# Patient Record
Sex: FEMALE | Race: WHITE | NOT HISPANIC OR LATINO | Employment: OTHER | URBAN - NONMETROPOLITAN AREA
[De-identification: names, ages, dates, MRNs, and addresses within clinical notes are randomized per-mention and may not be internally consistent; named-entity substitution may affect disease eponyms.]

---

## 2017-07-27 ENCOUNTER — HOSPITAL ENCOUNTER (EMERGENCY)
Facility: HOSPITAL | Age: 62
Discharge: HOME | End: 2017-07-27
Payer: COMMERCIAL

## 2017-07-27 VITALS
TEMPERATURE: 98.3 F | OXYGEN SATURATION: 98 % | RESPIRATION RATE: 18 BRPM | DIASTOLIC BLOOD PRESSURE: 88 MMHG | WEIGHT: 225 LBS | BODY MASS INDEX: 32.21 KG/M2 | SYSTOLIC BLOOD PRESSURE: 169 MMHG | HEIGHT: 70 IN | HEART RATE: 98 BPM

## 2017-07-27 DIAGNOSIS — J01.01 ACUTE RECURRENT MAXILLARY SINUSITIS: ICD-10-CM

## 2017-07-27 PROCEDURE — 99202 OFFICE O/P NEW SF 15 MIN: CPT

## 2017-07-27 PROCEDURE — 70486 CT MAXILLOFACIAL W/O DYE: CPT | Mod: TC

## 2017-07-27 PROCEDURE — 99213 OFFICE O/P EST LOW 20 MIN: CPT | Mod: 25

## 2017-07-27 ASSESSMENT — ENCOUNTER SYMPTOMS
FEVER: 0
ARTHRALGIAS: 0
NECK STIFFNESS: 0
ABDOMINAL PAIN: 0
ACTIVITY CHANGE: 1
SINUS PRESSURE: 1
RHINORRHEA: 1
COLOR CHANGE: 0
EYE REDNESS: 0
DIFFICULTY URINATING: 0
CONFUSION: 0
SHORTNESS OF BREATH: 0
APPETITE CHANGE: 1
HEADACHES: 0
COUGH: 1

## 2017-07-27 NOTE — DISCHARGE INSTRUCTIONS
See attached for home care  Rest, increase fluids  Tylenol/ibuprofon for pain  Start nasal irrigations with netti pot  Follow with flonase nasal spray  Take all of antibiotics as prescribed  Try to quit smoking  F/U with PCP if not improving or back to base line in 1 week  Return to  with worsening symptoms.       Sinusitis (Antibiotic Treatment)    The sinuses are air-filled spaces within the bones of the face. They connect to the inside of the nose. Sinusitis is an inflammation of the tissue lining the sinus cavity. Sinus inflammation can occur during a cold. It can also be due to allergies to pollens and other particles in the air. Sinusitis can cause symptoms of sinus congestion and fullness. A sinus infection causes fever, headache and facial pain. There is often green or yellow drainage from the nose or into the back of the throat (post-nasal drip). You have been given antibiotics to treat this condition.  Home care:    Take the full course of antibiotics as instructed. Do not stop taking them, even if you feel better.    Drink plenty of water, hot tea, and other liquids. This may help thin mucus. It also may promote sinus drainage.    Heat may help soothe painful areas of the face. Use a towel soaked in hot water. Or,  the shower and direct the hot spray onto your face. Using a vaporizer along with a menthol rub at night may also help.     An expectorant containing guaifenesin may help thin the mucus and promote drainage from the sinuses.    Over-the-counter decongestants may be used unless a similar medicine was prescribed. Nasal sprays work the fastest. Use one that contains phenylephrine or oxymetazoline. First blow the nose gently. Then use the spray. Do not use these medicines more often than directed on the label or symptoms may get worse. You may also use tablets containing pseudoephedrine. Avoid products that combine ingredients, because side effects may be increased. Read labels. You can  also ask the pharmacist for help. (NOTE: Persons with high blood pressure should not use decongestants. They can raise blood pressure.)    Over-the-counter antihistamines may help if allergies contributed to your sinusitis.      Do not use nasal rinses or irrigation during an acute sinus infection, unless told to by your health care provider. Rinsing may spread the infection to other sinuses.    Use acetaminophen or ibuprofen to control pain, unless another pain medicine was prescribed. (If you have chronic liver or kidney disease or ever had a stomach ulcer, talk with your doctor before using these medicines. Aspirin should never be used in anyone under 18 years of age who is ill with a fever. It may cause severe liver damage.)    Don't smoke. This can worsen symptoms.  Follow-up care  Follow up with your healthcare provider or our staff if you are not improving within the next week.  When to seek medical advice  Call your healthcare provider if any of these occur:    Facial pain or headache becoming more severe    Stiff neck    Unusual drowsiness or confusion    Swelling of the forehead or eyelids    Vision problems, including blurred or double vision    Fever of 100.4 F (38 C) or higher, or as directed by your healthcare provider    Seizure    Breathing problems    Symptoms not resolving within 10 days  Date Last Reviewed: 4/13/2015 2000-2017 [employer]. [address] [place] [address], [place] [address]. All rights reserved. This information is not intended as a substitute for professional medical care. Always follow your healthcare professional's instructions.

## 2017-07-27 NOTE — ED PROVIDER NOTES
"  History     Chief Complaint   Patient presents with     Sinusitis     ongoing for the last week     HPI  Delores Mac is a 62 year old female who presents to  via private car for evaluation of facial pressure, ear pain, nasal drainage, headache. Onset of sx 1+ week ago and progressively getting worse. Thick nasal drainage, scratchy throat, no fever, reports hx of sinusitis with similar sx. Smokes 1/2 ppd.     Allergies: No Known Allergies      No current facility-administered medications on file prior to encounter.   Current Outpatient Prescriptions on File Prior to Encounter:  Pseudoephedrine-DM-[id number] (DAY TIME COLD/FLU OR)      Patient Active Problem List   Diagnosis     Laceration of external auditory canal     Otorrhea     Chronic rhinitis     Hypertrophy of nasal turbinates     Past Surgical History:   Procedure Laterality Date     BREAST LUMPECTOMY, RT/LT       HYSTERECTOMY       TOE SURGERY      end of toes cut off from bone infection.       Social History   Substance Use Topics     Smoking status: Current Every Day Smoker     Packs/day: 0.75     Types: Cigarettes     Smokeless tobacco: Never Used     Alcohol use No       There is no immunization history on file for this patient.    BMI: Estimated body mass index is 32.28 kg/(m^2) as calculated from the following:    Height as of this encounter: 1.778 m (5' 10\").    Weight as of this encounter: 102.1 kg (225 lb).    Review of Systems   Constitutional: Positive for activity change and appetite change. Negative for fever.   HENT: Positive for congestion, postnasal drip, rhinorrhea and sinus pressure.    Eyes: Negative for redness.   Respiratory: Positive for cough. Negative for shortness of breath.    Cardiovascular: Negative for chest pain.   Gastrointestinal: Negative for abdominal pain.   Genitourinary: Negative for difficulty urinating.   Musculoskeletal: Negative for arthralgias and neck stiffness.   Skin: Negative for color change. " "  Neurological: Negative for headaches.   Psychiatric/Behavioral: Negative for confusion.       Physical Exam   BP: 169/88  Pulse: 98  Temp: 98.3  F (36.8  C)  Resp: 18  Height: 177.8 cm (5' 10\")  Weight: 102.1 kg (225 lb)  SpO2: 98 %  Physical Exam   Constitutional: No distress.   HENT:   Head: Normocephalic and atraumatic.   Right Ear: Tympanic membrane normal.   Left Ear: Tympanic membrane is retracted.   Nose: Mucosal edema and rhinorrhea present. Left sinus exhibits maxillary sinus tenderness.   Mouth/Throat: Uvula is midline, oropharynx is clear and moist and mucous membranes are normal.   PTP mild soft tissue swelling over left mastoid   Eyes: Conjunctivae are normal.   Neck: Normal range of motion.   Cardiovascular: Normal rate and regular rhythm.    Pulmonary/Chest: Effort normal and breath sounds normal. No respiratory distress.   Abdominal: Soft. She exhibits no distension.   Musculoskeletal: Normal range of motion.   Lymphadenopathy:     She has no cervical adenopathy.   Skin: Skin is warm and dry. She is not diaphoretic.       ED Course     Procedures         CT SCAN OF PARANASAL SINUSES    CLINICAL HISTORY:  Question left-sided mastoiditis versus sinusitis.    FINDINGS:  The mucosa of the paranasal sinuses is essentially normal.  There is, however, a small mucous retention cyst versus polypoid  lesion seen in the lateral margin of the posterior right ethmoid air  cell.  Ostiomeatal units are patent.  There is perhaps minimal mucosal  thickening seen along the right ostium.    Mastoid air cells of the temporal bones and the  middle ear cavities  are grossly normal.  Soft tissues are also normal.    IMPRESSION:  1.  MUCOUS RETENTION CYST VERSUS POLYPOID LESION INVOLVING THE  POSTERIOR RIGHT ETHMOID AIR CELL, WITH MILD MUCOSAL THICKENING ABOUT  THE RIGHT OSTIUM.    2.  NO EVIDENCE OF ABNORMALITY THAT WOULD ACCOUNT FOR THE PATIENT'S  LEFT-SIDED DISCOMFORT.    SIGNATURE PAGE ONLY  Exam Date: Jul 27, 2017 " 11:32:00 AM  Author: CARMELO RENEE    Assessments & Plan (with Medical Decision Making)   Pt presents with facial pressure, congestion, left mastoid pain on palpation. CT shows mild mucus thickening with mucous retention cyst vs polypoid lesion on the right, no indication of mastoiditis.   Findings consistent with recurrent sinusitis. Will treat with Augmentin, smoking cessation advised. Pt verbalizes understanding and agrees with plan.  Epic discharge instructions given. Pt discharged in stable condition.     I have reviewed the nursing notes.    I have reviewed the findings, diagnosis, plan and need for follow up with the patient.    Discharge Medication List as of 7/27/2017 11:39 AM          Final diagnoses:   Acute recurrent maxillary sinusitis     See attached for home care  Rest, increase fluids  Tylenol/ibuprofon for pain  Start nasal irrigations with netti pot  Follow with flonase nasal spray  Take all of antibiotics as prescribed  Try to quit smoking  F/U with PCP if not improving or back to base line in 1 week  Return to  with worsening symptoms.     7/27/2017   [facility]     Dot Orantes APRN FNP  07/28/17 1021

## 2017-07-27 NOTE — ED NOTES
C/o sinus pain, facial pain/pressure and congestion for the last week. Pt is going on vacation tomorrow and concerned she may need antibiotics beforehand

## 2017-07-27 NOTE — PROGRESS NOTES
CT paranasal sinuses report routed to Dr Goodrich. IMPRESSION:  MUCOUS RETENTION CYST VERSUS POLYPOID LESION INVOLVING THE POSTERIOR RIGHT ETHMOID AIR CELL, WITH MILD MUCOSAL THICKENING ABOUT THE RIGHT OSTIUM.

## 2017-07-27 NOTE — ED AVS SNAPSHOT
[facility]    [address]    [place] [address]    Phone:  [phone]                                       Delores Mac   MRN: [id number]    Department:  [facility]   Date of Visit:  7/27/2017           Patient Information     Date Of Birth          1955        Your diagnoses for this visit were:     Acute recurrent maxillary sinusitis        You were seen by Dot Orantes APRN FNP.      Follow-up Information     Follow up with MATTHEW Goodrich MD In 1 week.    Specialty:  Family Practice    Why:  if not improving or back to baseline    Contact information:    [facility] [place]  [address]  [place] [address] [phone]          Follow up with [facility].    Specialty:  EMERGENCY MEDICINE    Why:  As needed, If symptoms worsen    Contact information:    [address] [address] [phone]    Additional information:    From [place]: Take [address]. Turn left at [address]. Turn left at the first stoplight on [address]. At the first stop sign, take a right onto [address]. Take a left into the parking lot and continue through until you reach the North enterance of the building.       From [place]: Take [address]. Take the [address] ramp towards [place]. Turn left onto [address]. Take a slight right onto [address]. Turn left at the first stoplight on [address]. At the first stop sign, take a right onto [address]. Take a left into the parking lot and continue through until you reach the North enterance of the building.       From [place]: Take [address]. Take a right at [address]. At the first stop sign, take a right onto [address]. Take a left into the parking lot and continue through until you reach the North enterance of the building.         Discharge Instructions         See attached for home care  Rest,  increase fluids  Tylenol/ibuprofon for pain  Start nasal irrigations with netti pot  Follow with flonase nasal spray  Take all of antibiotics as prescribed  Try to quit smoking  F/U with PCP if not improving or back to base line in 1 week  Return to  with worsening symptoms.       Sinusitis (Antibiotic Treatment)    The sinuses are air-filled spaces within the bones of the face. They connect to the inside of the nose. Sinusitis is an inflammation of the tissue lining the sinus cavity. Sinus inflammation can occur during a cold. It can also be due to allergies to pollens and other particles in the air. Sinusitis can cause symptoms of sinus congestion and fullness. A sinus infection causes fever, headache and facial pain. There is often green or yellow drainage from the nose or into the back of the throat (post-nasal drip). You have been given antibiotics to treat this condition.  Home care:    Take the full course of antibiotics as instructed. Do not stop taking them, even if you feel better.    Drink plenty of water, hot tea, and other liquids. This may help thin mucus. It also may promote sinus drainage.    Heat may help soothe painful areas of the face. Use a towel soaked in hot water. Or,  the shower and direct the hot spray onto your face. Using a vaporizer along with a menthol rub at night may also help.     An expectorant containing guaifenesin may help thin the mucus and promote drainage from the sinuses.    Over-the-counter decongestants may be used unless a similar medicine was prescribed. Nasal sprays work the fastest. Use one that contains phenylephrine or oxymetazoline. First blow the nose gently. Then use the spray. Do not use these medicines more often than directed on the label or symptoms may get worse. You may also use tablets containing pseudoephedrine. Avoid products that combine ingredients, because side effects may be increased. Read labels. You can also ask the pharmacist for help.  (NOTE: Persons with high blood pressure should not use decongestants. They can raise blood pressure.)    Over-the-counter antihistamines may help if allergies contributed to your sinusitis.      Do not use nasal rinses or irrigation during an acute sinus infection, unless told to by your health care provider. Rinsing may spread the infection to other sinuses.    Use acetaminophen or ibuprofen to control pain, unless another pain medicine was prescribed. (If you have chronic liver or kidney disease or ever had a stomach ulcer, talk with your doctor before using these medicines. Aspirin should never be used in anyone under 18 years of age who is ill with a fever. It may cause severe liver damage.)    Don't smoke. This can worsen symptoms.  Follow-up care  Follow up with your healthcare provider or our staff if you are not improving within the next week.  When to seek medical advice  Call your healthcare provider if any of these occur:    Facial pain or headache becoming more severe    Stiff neck    Unusual drowsiness or confusion    Swelling of the forehead or eyelids    Vision problems, including blurred or double vision    Fever of 100.4 F (38 C) or higher, or as directed by your healthcare provider    Seizure    Breathing problems    Symptoms not resolving within 10 days  Date Last Reviewed: 4/13/2015 2000-2017 [employer]. [address] [place] [address], [place] [address]. All rights reserved. This information is not intended as a substitute for professional medical care. Always follow your healthcare professional's instructions.             Review of your medicines      Our records show that you are taking the medicines listed below. If these are incorrect, please call your family doctor or clinic.        Dose / Directions Last dose taken    DAY TIME COLD/FLU OR        Refills:  0                Procedures and tests performed during your visit     CT Sinus w/o Contrast      Orders Needing Specimen  "Collection     None      Pending Results     Date and Time Order Name Status Description    2017 1115 CT Sinus w/o Contrast Preliminary             Pending Culture Results     No orders found from 2017 to 2017.            Thank you for choosing [facility]       Thank you for choosing [facility] for your care. Our goal is always to provide you with excellent care. Hearing back from our patients is one way we can continue to improve our services. Please take a few minutes to complete the written survey that you may receive in the mail after you visit with us. Thank you!        [employer] [facility] Information     [facility] lets you send messages to your doctor, view your test results, renew your prescriptions, schedule appointments and more. To sign up, go to [email or web address] . Click on \"Log in\" on the left side of the screen, which will take you to the Welcome page. Then click on \"Sign up Now\" on the right side of the page.     You will be asked to enter the access code listed below, as well as some personal information. Please follow the directions to create your username and password.     Your access code is: [email or web address]  Expires: 10/25/2017 11:39 AM     Your access code will  in 90 days. If you need help or a new code, please call your [facility] clinic or [phone].        Care EveryWhere ID     This is your Care EveryWhere ID. This could be used by other organizations to access your [facility] medical records  [id number]        Equal Access to Services     RONNIE ESPINOZA AH: Hadii rhea tobiaso Sovy, waaxda luqadaha, qaybta kaalmada jose, stefan acevedo . So [facility] [place] [phone].    ATENCIÓN: Si habla español, tiene a massey disposición servicios gratuitos de asistencia lingüística. Llame al [phone].    We comply with applicable federal civil rights laws and [place] laws. We do not discriminate on the basis of race, color, national origin, age, disability sex, sexual " orientation or gender identity.            After Visit Summary       This is your record. Keep this with you and show to your community pharmacist(s) and doctor(s) at your next visit.

## 2017-07-27 NOTE — ED AVS SNAPSHOT
[facility]    [address] [place] [address]    Phone:  [phone]                                       Delores Mac   MRN: [id number]    Department:  [facility]   Date of Visit:  7/27/2017           After Visit Summary Signature Page     I have received my discharge instructions, and my questions have been answered. I have discussed any challenges I see with this plan with the nurse or doctor.    ..........................................................................................................................................  Patient/Patient Representative Signature      ..........................................................................................................................................  Patient Representative Print Name and Relationship to Patient    ..................................................               ................................................  Date                                            Time    ..........................................................................................................................................  Reviewed by Signature/Title    ...................................................              ..............................................  Date                                                            Time

## 2017-08-07 ENCOUNTER — OFFICE VISIT (OUTPATIENT)
Dept: FAMILY MEDICINE | Facility: OTHER | Age: 62
End: 2017-08-07
Attending: FAMILY MEDICINE
Payer: COMMERCIAL

## 2017-08-07 ENCOUNTER — TELEPHONE (OUTPATIENT)
Dept: FAMILY MEDICINE | Facility: OTHER | Age: 62
End: 2017-08-07

## 2017-08-07 VITALS
TEMPERATURE: 99.4 F | HEART RATE: 105 BPM | WEIGHT: 219 LBS | OXYGEN SATURATION: 96 % | HEIGHT: 70 IN | BODY MASS INDEX: 31.35 KG/M2 | SYSTOLIC BLOOD PRESSURE: 122 MMHG | DIASTOLIC BLOOD PRESSURE: 82 MMHG

## 2017-08-07 DIAGNOSIS — J01.00 ACUTE MAXILLARY SINUSITIS, RECURRENCE NOT SPECIFIED: Primary | ICD-10-CM

## 2017-08-07 PROCEDURE — 99213 OFFICE O/P EST LOW 20 MIN: CPT | Performed by: FAMILY MEDICINE

## 2017-08-07 RX ORDER — LEVOFLOXACIN 500 MG/1
500 TABLET, FILM COATED ORAL DAILY
Qty: 10 TABLET | Refills: 0 | Status: SHIPPED | OUTPATIENT
Start: 2017-08-07 | End: 2018-06-25

## 2017-08-07 RX ORDER — FLUTICASONE PROPIONATE 50 MCG
2 SPRAY, SUSPENSION (ML) NASAL 2 TIMES DAILY
COMMUNITY
End: 2018-06-25

## 2017-08-07 ASSESSMENT — ANXIETY QUESTIONNAIRES
GAD7 TOTAL SCORE: 0
7. FEELING AFRAID AS IF SOMETHING AWFUL MIGHT HAPPEN: NOT AT ALL
6. BECOMING EASILY ANNOYED OR IRRITABLE: NOT AT ALL
3. WORRYING TOO MUCH ABOUT DIFFERENT THINGS: NOT AT ALL
5. BEING SO RESTLESS THAT IT IS HARD TO SIT STILL: NOT AT ALL
2. NOT BEING ABLE TO STOP OR CONTROL WORRYING: NOT AT ALL
1. FEELING NERVOUS, ANXIOUS, OR ON EDGE: NOT AT ALL
4. TROUBLE RELAXING: NOT AT ALL

## 2017-08-07 ASSESSMENT — PATIENT HEALTH QUESTIONNAIRE - PHQ9: SUM OF ALL RESPONSES TO PHQ QUESTIONS 1-9: 0

## 2017-08-07 ASSESSMENT — PAIN SCALES - GENERAL: PAINLEVEL: MODERATE PAIN (5)

## 2017-08-07 NOTE — TELEPHONE ENCOUNTER
8:16 AM    Reason for Call: OVERBOOK    Patient is having the following symptoms: Follow up sinus infection for 2 weeks.    The patient is requesting an appointment for overbook with Joleen. - patient stated provider told her to call back today if not feeling better and she would be worked in.     Was an appointment offered for this call? Yes 08/08 patient declined    Preferred method for responding to this message: Telephone Call    If we cannot reach you directly, may we leave a detailed response at the number you provided? Yes    Can this message wait until your PCP/provider returns, if unavailable today? Not applicable,     Letitia Morfin

## 2017-08-07 NOTE — NURSING NOTE
"Chief Complaint   Patient presents with     Sinus Problem       Initial /82 (BP Location: Left arm, Patient Position: Chair, Cuff Size: Adult Large)  Pulse 105  Temp 99.4  F (37.4  C) (Tympanic)  Ht 5' 10\" (1.778 m)  Wt 219 lb (99.3 kg)  SpO2 96%  BMI 31.42 kg/m2 Estimated body mass index is 31.42 kg/(m^2) as calculated from the following:    Height as of this encounter: 5' 10\" (1.778 m).    Weight as of this encounter: 219 lb (99.3 kg).  Medication Reconciliation: complete     ASAD VERGARA      "

## 2017-08-07 NOTE — MR AVS SNAPSHOT
After Visit Summary   8/7/2017    Delores Mac    MRN: [id number]           Patient Information     Date Of Birth          1955        Visit Information        Provider Department      8/7/2017 4:00 PM [facility]        Today's Diagnoses     Acute maxillary sinusitis, recurrence not specified    -  1      Care Instructions    Stop the DM, see DR. Looney          Follow-ups after your visit        Additional Services     OTOLARYNGOLOGY REFERRAL       Your provider has referred you to: [facility] Davion Azul [phone]   [email or web address]    Please be aware that coverage of these services is subject to the terms and limitations of your health insurance plan.  Call member services at your health plan with any benefit or coverage questions.      Please bring the following to your appointment:  >>   Any x-rays, CTs or MRIs which have been performed.  Contact the facility where they were done to arrange for  prior to your scheduled appointment.  Any new CT, MRI or other procedures ordered by your specialist must be performed at a [facility] facility or coordinated by your clinic's referral office.    >>   List of current medications   >>   This referral request   >>   Any documents/labs given to you for this referral                  Who to contact     If you have questions or need follow up information about today's clinic visit or your schedule please [facility] at [phone].  Normal or non-critical lab and imaging results will be communicated to you by MyChart, letter or phone within 4 business days after the clinic has received the results. If you do not hear from us within 7 days, please contact the clinic through MyChart or phone. If you have a critical or abnormal lab result, we will notify you by phone as soon as possible.  Submit refill requests  "through [employer] or call your pharmacy and they will forward the refill request to us. Please allow 3 business days for your refill to be completed.          Additional Information About Your Visit        [employer]Wall Information     Ret[employer]ly lets you send messages to your doctor, view your test results, renew your prescriptions, schedule appointments and more. To sign up, go to [email or web address] . Click on \"Log in\" on the left side of the screen, which will take you to the Welcome page. Then click on \"Sign up Now\" on the right side of the page.     You will be asked to enter the access code listed below, as well as some personal information. Please follow the directions to create your username and password.     Your access code is: [email or web address]  Expires: 10/25/2017 11:39 AM     Your access code will  in 90 days. If you need help or a new code, please call your [facility] clinic or [phone].        Care EveryWhere ID     This is your Care EveryWhere ID. This could be used by other organizations to access your [facility] medical records  [id number]        Your Vitals Were     Pulse Temperature Height Pulse Oximetry BMI (Body Mass Index)       105 99.4  F (37.4  C) (Tympanic) 5' 10\" (1.778 m) 96% 31.42 kg/m2        Blood Pressure from Last 3 Encounters:   17 122/82   17 169/88   14 126/76    Weight from Last 3 Encounters:   17 219 lb (99.3 kg)   17 225 lb (102.1 kg)   14 200 lb (90.7 kg)              We Performed the Following     OTOLARYNGOLOGY REFERRAL          Today's Medication Changes          These changes are accurate as of: 17  4:14 PM.  If you have any questions, ask your nurse or doctor.               Start taking these medicines.        Dose/Directions    levofloxacin 500 MG tablet   Commonly known as:  LEVAQUIN   Used for:  Acute maxillary sinusitis, recurrence not specified   Started by:  MATTHEW Goodrich MD        Dose:  500 mg   Take 1 tablet (500 mg) by " mouth daily   Quantity:  10 tablet   Refills:  0       loratadine-pseudoePHEDrine  MG per 24 hr tablet   Commonly known as:  CLARITIN-D 24-hour   Used for:  Acute maxillary sinusitis, recurrence not specified   Started by:  MATTHEW Goodrich MD        Dose:  1 tablet   Take 1 tablet by mouth daily   Quantity:  20 tablet   Refills:  0         Stop taking these medicines if you haven't already. Please contact your care team if you have questions.     amoxicillin-clavulanate 875-125 MG per tablet   Commonly known as:  AUGMENTIN   Stopped by:  MATTHEW Goodrich MD                Where to get your medicines      These medications were sent to [id number] - [place], [place] - [address] AT [facility] [address], [facility] [address]     Phone:  [phone]     levofloxacin 500 MG tablet         Some of these will need a paper prescription and others can be bought over the counter.  Ask your nurse if you have questions.     Bring a paper prescription for each of these medications     loratadine-pseudoePHEDrine  MG per 24 hr tablet                Primary Care Provider Office Phone # Fax #    MATTHEW Goodrich [phone]       [address] [address]        Equal Access to Services     RONNIE ESPINOZA AH: Hadii rhea ku hadasho Soomaali, waaxda luqadaha, qaybta kaalmada adeegyada, waxay idiin hayren arlyn blum. So [facility] [phone].    ATENCIÓN: Si habla español, tiene a massey disposición servicios gratuitos de asistencia lingüística. Llame al [phone].    We comply with applicable federal civil rights laws and [place] laws. We do not discriminate on the basis of race, color, national origin, age, disability sex, sexual orientation or gender identity.            Thank you!     Thank you for choosing [facility]  for your care. Our goal is always to provide you with excellent care. Hearing back from our  patients is one way we can continue to improve our services. Please take a few minutes to complete the written survey that you may receive in the mail after your visit with us. Thank you!             Your Updated Medication List - Protect others around you: Learn how to safely use, store and throw away your medicines at www.disposemymeds.org.          This list is accurate as of: 8/7/17  4:14 PM.  Always use your most recent med list.                   Brand Name Dispense Instructions for use Diagnosis    DAY TIME COLD/FLU OR           fluticasone 50 MCG/ACT spray    FLONASE     Spray 2 sprays into both nostrils 2 times daily        levofloxacin 500 MG tablet    LEVAQUIN    10 tablet    Take 1 tablet (500 mg) by mouth daily    Acute maxillary sinusitis, recurrence not specified       loratadine-pseudoePHEDrine  MG per 24 hr tablet    CLARITIN-D 24-hour    20 tablet    Take 1 tablet by mouth daily    Acute maxillary sinusitis, recurrence not specified

## 2017-08-07 NOTE — PROGRESS NOTES
SUBJECTIVE:                                                    Delores Mac is a 62 year old female who presents to clinic today for the following health issues:      RESPIRATORY SYMPTOMS      Duration: Follow up still having symptoms- see CT scan regarding results    Description  nasal congestion, rhinorrhea, facial pain/pressure, cough, wheezing, headache, fatigue/malaise, hoarse voice and diarrhea    Severity: moderate    Accompanying signs and symptoms: chest congestion    History (predisposing factors):  yes    Precipitating or alleviating factors: None    Therapies tried and outcome:  augmentin- finished          She does smoke    Problem list and histories reviewed & adjusted, as indicated.  Additional history: as documented    Patient Active Problem List   Diagnosis     Laceration of external auditory canal     Otorrhea     Chronic rhinitis     Hypertrophy of nasal turbinates     Past Surgical History:   Procedure Laterality Date     BREAST LUMPECTOMY, RT/LT       HYSTERECTOMY       TOE SURGERY      end of toes cut off from bone infection.       Social History   Substance Use Topics     Smoking status: Current Every Day Smoker     Packs/day: 0.75     Types: Cigarettes     Smokeless tobacco: Never Used     Alcohol use No     Family History   Problem Relation Age of Onset     Breast Cancer Mother      HEART DISEASE Brother          Current Outpatient Prescriptions   Medication Sig Dispense Refill     fluticasone (FLONASE) 50 MCG/ACT spray Spray 2 sprays into both nostrils 2 times daily       levofloxacin (LEVAQUIN) 500 MG tablet Take 1 tablet (500 mg) by mouth daily 10 tablet 0     loratadine-pseudoePHEDrine (CLARITIN-D 24-HOUR)  MG per 24 hr tablet Take 1 tablet by mouth daily 20 tablet 0     Pseudoephedrine-DM-GG-APAP (DAY TIME COLD/FLU OR)        No Known Allergies      Reviewed and updated as needed this visit by clinical staff     Reviewed and updated as needed this visit by Provider      "    ROS:  Constitutional, HEENT, cardiovascular, pulmonary, gi and gu systems are negative, except as otherwise noted.      OBJECTIVE:   /82 (BP Location: Left arm, Patient Position: Chair, Cuff Size: Adult Large)  Pulse 105  Temp 99.4  F (37.4  C) (Tympanic)  Ht 5' 10\" (1.778 m)  Wt 219 lb (99.3 kg)  SpO2 96%  BMI 31.42 kg/m2  Body mass index is 31.42 kg/(m^2).  GENERAL: healthy, alert and no distress  NECK: no adenopathy, no asymmetry, masses, or scars and thyroid normal to palpation  HEENT: normal TMs.  She has maxillary sinus tenderness oropharynx is negative  RESP: lungs clear to auscultation - no rales, rhonchi or wheezes  CV: regular rate and rhythm, normal S1 S2, no S3 or S4, no murmur, click or rub, no peripheral edema and peripheral pulses strong  MS: no gross musculoskeletal defects noted, no edema    Diagnostic Test Results:  Results for orders placed or performed during the hospital encounter of 07/27/17   CT Sinus w/o Contrast    Narrative    CT SCAN OF PARANASAL SINUSES    CLINICAL HISTORY:  Question left-sided mastoiditis versus sinusitis.    FINDINGS:  The mucosa of the paranasal sinuses is essentially normal.  There is, however, a small mucous retention cyst versus polypoid  lesion seen in the lateral margin of the posterior right ethmoid air  cell.  Ostiomeatal units are patent.  There is perhaps minimal mucosal  thickening seen along the right ostium.    Mastoid air cells of the temporal bones and the  middle ear cavities  are grossly normal.  Soft tissues are also normal.    IMPRESSION:  1.  MUCOUS RETENTION CYST VERSUS POLYPOID LESION INVOLVING THE  POSTERIOR RIGHT ETHMOID AIR CELL, WITH MILD MUCOSAL THICKENING ABOUT  THE RIGHT OSTIUM.    2.  NO EVIDENCE OF ABNORMALITY THAT WOULD ACCOUNT FOR THE PATIENT'S  LEFT-SIDED DISCOMFORT.                        SIGNATURE PAGE ONLY  Exam Date: Jul 27, 2017 11:32:00 AM  Author: CARMELO RENEE  This report is final and signed        "     ASSESSMENT/PLAN:               ICD-10-CM    1. Acute maxillary sinusitis, recurrence not specified J01.00 levofloxacin (LEVAQUIN) 500 MG tablet once daily for 10 days.  Use Claritin-D.  Because of her abnormal sinus CT will set her up to see ENT     loratadine-pseudoePHEDrine (CLARITIN-D 24-HOUR)  MG per 24 hr tablet     OTOLARYNGOLOGY REFERRAL           R Arsh Goodrich MD  [facility]

## 2017-08-08 ASSESSMENT — ANXIETY QUESTIONNAIRES: GAD7 TOTAL SCORE: 0

## 2017-11-26 ENCOUNTER — HEALTH MAINTENANCE LETTER (OUTPATIENT)
Age: 62
End: 2017-11-26

## 2018-06-25 ENCOUNTER — RADIANT APPOINTMENT (OUTPATIENT)
Dept: GENERAL RADIOLOGY | Facility: OTHER | Age: 63
End: 2018-06-25
Attending: FAMILY MEDICINE
Payer: COMMERCIAL

## 2018-06-25 ENCOUNTER — OFFICE VISIT (OUTPATIENT)
Dept: FAMILY MEDICINE | Facility: OTHER | Age: 63
End: 2018-06-25
Attending: FAMILY MEDICINE
Payer: COMMERCIAL

## 2018-06-25 ENCOUNTER — TELEPHONE (OUTPATIENT)
Dept: FAMILY MEDICINE | Facility: OTHER | Age: 63
End: 2018-06-25

## 2018-06-25 VITALS
OXYGEN SATURATION: 96 % | TEMPERATURE: 98.8 F | DIASTOLIC BLOOD PRESSURE: 81 MMHG | WEIGHT: 221 LBS | HEART RATE: 95 BPM | BODY MASS INDEX: 31.64 KG/M2 | HEIGHT: 70 IN | SYSTOLIC BLOOD PRESSURE: 122 MMHG

## 2018-06-25 DIAGNOSIS — R07.9 RIGHT-SIDED CHEST PAIN: Primary | ICD-10-CM

## 2018-06-25 DIAGNOSIS — R07.9 RIGHT-SIDED CHEST PAIN: ICD-10-CM

## 2018-06-25 PROCEDURE — 71101 X-RAY EXAM UNILAT RIBS/CHEST: CPT | Mod: TC

## 2018-06-25 PROCEDURE — 99213 OFFICE O/P EST LOW 20 MIN: CPT | Performed by: FAMILY MEDICINE

## 2018-06-25 ASSESSMENT — PAIN SCALES - GENERAL: PAINLEVEL: EXTREME PAIN (8)

## 2018-06-25 NOTE — TELEPHONE ENCOUNTER
9:37 AM    Reason for Call: OVERBOOK    Patient is having the following symptoms: fell down the steps and thinks she broke some ribs for 2 days.    The patient is requesting an appointment for 06/25/18 with Dr.Jon Goodrich.    Was an appointment offered for this call? No  If yes : Appointment type              Date    Preferred method for responding to this message: Telephone Call  What is your phone number ?[phone]    If we cannot reach you directly, may we leave a detailed response at the number you provided? Yes    Can this message wait until your PCP/provider returns, if unavailable today? Not applicable, PCP is in     [facility]

## 2018-06-25 NOTE — NURSING NOTE
"Chief Complaint   Patient presents with     Musculoskeletal Problem       Initial /81 (BP Location: Left arm, Patient Position: Chair, Cuff Size: Adult Large)  Pulse 95  Temp 98.8  F (37.1  C) (Tympanic)  Ht 5' 10\" (1.778 m)  Wt 221 lb (100.2 kg)  SpO2 96%  BMI 31.71 kg/m2 Estimated body mass index is 31.71 kg/(m^2) as calculated from the following:    Height as of this encounter: 5' 10\" (1.778 m).    Weight as of this encounter: 221 lb (100.2 kg).  Medication Reconciliation: complete    Caroline Zhao LPN    "

## 2018-06-25 NOTE — PROGRESS NOTES
"  SUBJECTIVE:   Delores Mac is a 62 year old female who presents to clinic today for the following health issues:      Musculoskeletal problem/pain      Duration: Since Saturday. Got worse yesterday    Description  Location: Right side back ribs    Intensity:  8/10  Goes up to a 12 when sneezing or coughing    Accompanying signs and symptoms: none    History  Previous similar problem: no   Previous evaluation:  none    Precipitating or alleviating factors:  Trauma or overuse: YES  Aggravating factors include: standing and coughing and sneezing, lowering into a chair and getting out of the chair is hard    Therapies tried and outcome: ice and Ibuprofen      [facility]    Delores Mac, 62 year old, female presents with   Chief Complaint   Patient presents with     Musculoskeletal Problem       PAST MEDICAL HISTORY:  History reviewed. No pertinent past medical history.    PAST SURGICAL HISTORY:  Past Surgical History:   Procedure Laterality Date     BREAST LUMPECTOMY, RT/LT       HYSTERECTOMY       TOE SURGERY      end of toes cut off from bone infection.       MEDICATIONS:  Prior to Admission medications    Not on File       ALLERGIES:   No Known Allergies    ROS:  Constitutional, HEENT, cardiovascular, pulmonary, gi and gu systems are negative, except as otherwise noted.      EXAM:  /81 (BP Location: Left arm, Patient Position: Chair, Cuff Size: Adult Large)  Pulse 95  Temp 98.8  F (37.1  C) (Tympanic)  Ht 5' 10\" (1.778 m)  Wt 221 lb (100.2 kg)  SpO2 96%  BMI 31.71 kg/m2 Body mass index is 31.71 kg/(m^2).   GENERAL APPEARANCE: healthy, alert and no distress  RESP: lungs clear to auscultation - no rales, rhonchi or wheezes  CV: regular rates and rhythm, normal S1 S2, no S3 or S4 and no murmur, click or rub  MS: Has little bruising in right posterior chest tenderness there are no swelling  Lab/ X-ray  Preliminary chest x-ray with ribs negative for fracture    ASSESSMENT/PLAN:    " ICD-10-CM    1. Right-sided chest pain R07.9 XR Ribs & Chest Rt 3vw     Patient has right-sided chest pain from a fall and has a chest wall contusion no apparent fracture.  Explained that if she develops sudden shortness of breath she needs to go to the emergency room.  Most likely this is a intercostal strain and it should get better in the next few days.  It is possible she has a fracture that cannot be seen on plain x-ray.  She is aware that there is no procedure where we cast these types of fractures.  If she has a cough she noticed a splint but otherwise will work with deep breathing and she will take ibuprofen and use ice therapy.    SHAD Goodrich MD  June 25, 2018

## 2018-06-25 NOTE — LETTER
[facility]  [address] [place] [address]  Phone: [phone]    June 25, 2018        Delores LOCKWOOD Bubba  [address] [place] [address]          To whom it may concern:    RE: Delores LOCKWOOD Bubba    Patient was seen and treated today at our clinic.    Please contact me for questions or concerns.      Sincerely,        MATTHEW Goodrich MD

## 2018-06-25 NOTE — MR AVS SNAPSHOT
"              After Visit Summary   6/25/2018    Delores Mac    MRN: [id number]           Patient Information     Date Of Birth          1955        Visit Information        Provider Department      6/25/2018 3:45 PM [facility]        Today's Diagnoses     Need for hepatitis C screening test    -  1    Right-sided chest pain           Follow-ups after your visit        Who to contact     If you have questions or need follow up information about today's clinic visit or your schedule please contact [facility] directly at [phone].  Normal or non-critical lab and imaging results will be communicated to you by MyChart, letter or phone within 4 business days after the clinic has received the results. If you do not hear from us within 7 days, please contact the clinic through MyChart or phone. If you have a critical or abnormal lab result, we will notify you by phone as soon as possible.  Submit refill requests through [employer] or call your pharmacy and they will forward the refill request to us. Please allow 3 business days for your refill to be completed.          Additional Information About Your Visit        Care EveryWhere ID     This is your Care EveryWhere ID. This could be used by other organizations to access your [place] medical records  [id number]        Your Vitals Were     Pulse Temperature Height Pulse Oximetry BMI (Body Mass Index)       95 98.8  F (37.1  C) (Tympanic) 5' 10\" (1.778 m) 96% 31.71 kg/m2        Blood Pressure from Last 3 Encounters:   06/25/18 122/81   08/07/17 122/82   07/27/17 169/88    Weight from Last 3 Encounters:   06/25/18 221 lb (100.2 kg)   08/07/17 219 lb (99.3 kg)   07/27/17 225 lb (102.1 kg)                 Today's Medication Changes          These changes are accurate as of 6/25/18  4:18 PM.  If you have any questions, ask your nurse or doctor.               Stop taking these medicines if you haven't already. Please " contact your care team if you have questions.     loratadine-pseudoePHEDrine  MG per 24 hr tablet   Commonly known as:  CLARITIN-D 24-hour   Stopped by:  MATTHEW Goodrich MD                    Primary Care Provider Office Phone # Fax #    MATTHEW Goodrich [phone] [address] [address]        Equal Access to Services     [facility]: Hadii aad ku hadasho Soomaali, waaxda luqadaha, qaybta kaalmada adeegyada, waxay idiin hayaan adeeg kharash la'aan . So [facility] [phone].    ATENCIÓN: Si habla español, tiene a massey disposición servicios gratuitos de asistencia lingüística. Llame al [phone].    We comply with applicable federal civil rights laws and [place] laws. We do not discriminate on the basis of race, color, national origin, age, disability, sex, sexual orientation, or gender identity.            Thank you!     Thank you for choosing [facility]  for your care. Our goal is always to provide you with excellent care. Hearing back from our patients is one way we can continue to improve our services. Please take a few minutes to complete the written survey that you may receive in the mail after your visit with us. Thank you!             Your Updated Medication List - Protect others around you: Learn how to safely use, store and throw away your medicines at www.disposemymeds.org.      Notice  As of 6/25/2018  4:18 PM    You have not been prescribed any medications.

## 2018-06-27 ASSESSMENT — ANXIETY QUESTIONNAIRES
4. TROUBLE RELAXING: NOT AT ALL
7. FEELING AFRAID AS IF SOMETHING AWFUL MIGHT HAPPEN: NOT AT ALL
1. FEELING NERVOUS, ANXIOUS, OR ON EDGE: NOT AT ALL
5. BEING SO RESTLESS THAT IT IS HARD TO SIT STILL: NOT AT ALL
2. NOT BEING ABLE TO STOP OR CONTROL WORRYING: NOT AT ALL
3. WORRYING TOO MUCH ABOUT DIFFERENT THINGS: NOT AT ALL
GAD7 TOTAL SCORE: 0
6. BECOMING EASILY ANNOYED OR IRRITABLE: NOT AT ALL

## 2018-06-28 ASSESSMENT — PATIENT HEALTH QUESTIONNAIRE - PHQ9: SUM OF ALL RESPONSES TO PHQ QUESTIONS 1-9: 2

## 2018-06-28 ASSESSMENT — ANXIETY QUESTIONNAIRES: GAD7 TOTAL SCORE: 0

## 2018-10-05 ENCOUNTER — APPOINTMENT (OUTPATIENT)
Dept: GENERAL RADIOLOGY | Facility: HOSPITAL | Age: 63
End: 2018-10-05
Attending: FAMILY MEDICINE
Payer: OTHER MISCELLANEOUS

## 2018-10-05 ENCOUNTER — HOSPITAL ENCOUNTER (EMERGENCY)
Facility: HOSPITAL | Age: 63
Discharge: ANOTHER HOSPITAL | End: 2018-10-05
Attending: FAMILY MEDICINE | Admitting: FAMILY MEDICINE
Payer: OTHER MISCELLANEOUS

## 2018-10-05 ENCOUNTER — APPOINTMENT (OUTPATIENT)
Dept: CT IMAGING | Facility: HOSPITAL | Age: 63
End: 2018-10-05
Attending: FAMILY MEDICINE
Payer: OTHER MISCELLANEOUS

## 2018-10-05 VITALS
OXYGEN SATURATION: 93 % | HEART RATE: 91 BPM | DIASTOLIC BLOOD PRESSURE: 100 MMHG | SYSTOLIC BLOOD PRESSURE: 179 MMHG | RESPIRATION RATE: 18 BRPM | TEMPERATURE: 98.6 F

## 2018-10-05 DIAGNOSIS — S42.352A COMMINUTED LEFT HUMERAL FRACTURE, CLOSED, INITIAL ENCOUNTER: ICD-10-CM

## 2018-10-05 LAB
ALBUMIN SERPL-MCNC: 3.7 G/DL (ref 3.4–5)
ALP SERPL-CCNC: 117 U/L (ref 40–150)
ALT SERPL W P-5'-P-CCNC: 33 U/L (ref 0–50)
ANION GAP SERPL CALCULATED.3IONS-SCNC: 6 MMOL/L (ref 3–14)
APTT PPP: 24 SEC (ref 24–37)
AST SERPL W P-5'-P-CCNC: 13 U/L (ref 0–45)
BASOPHILS # BLD AUTO: 0.1 10E9/L (ref 0–0.2)
BASOPHILS NFR BLD AUTO: 0.3 %
BILIRUB SERPL-MCNC: 0.3 MG/DL (ref 0.2–1.3)
BUN SERPL-MCNC: 15 MG/DL (ref 7–30)
CALCIUM SERPL-MCNC: 8.8 MG/DL (ref 8.5–10.1)
CHLORIDE SERPL-SCNC: 107 MMOL/L (ref 94–109)
CO2 SERPL-SCNC: 25 MMOL/L (ref 20–32)
CREAT SERPL-MCNC: 0.72 MG/DL (ref 0.52–1.04)
DIFFERENTIAL METHOD BLD: ABNORMAL
EOSINOPHIL # BLD AUTO: 0 10E9/L (ref 0–0.7)
EOSINOPHIL NFR BLD AUTO: 0.2 %
ERYTHROCYTE [DISTWIDTH] IN BLOOD BY AUTOMATED COUNT: 13.5 % (ref 10–15)
GFR SERPL CREATININE-BSD FRML MDRD: 82 ML/MIN/1.7M2
GLUCOSE SERPL-MCNC: 104 MG/DL (ref 70–99)
HCT VFR BLD AUTO: 45 % (ref 35–47)
HGB BLD-MCNC: 15.1 G/DL (ref 11.7–15.7)
IMM GRANULOCYTES # BLD: 0.1 10E9/L (ref 0–0.4)
IMM GRANULOCYTES NFR BLD: 0.5 %
INR PPP: 1.03 (ref 0.8–1.2)
LYMPHOCYTES # BLD AUTO: 1.5 10E9/L (ref 0.8–5.3)
LYMPHOCYTES NFR BLD AUTO: 8.3 %
MCH RBC QN AUTO: 30.6 PG (ref 26.5–33)
MCHC RBC AUTO-ENTMCNC: 33.6 G/DL (ref 31.5–36.5)
MCV RBC AUTO: 91 FL (ref 78–100)
MONOCYTES # BLD AUTO: 1 10E9/L (ref 0–1.3)
MONOCYTES NFR BLD AUTO: 5.2 %
NEUTROPHILS # BLD AUTO: 15.5 10E9/L (ref 1.6–8.3)
NEUTROPHILS NFR BLD AUTO: 85.5 %
NRBC # BLD AUTO: 0 10*3/UL
NRBC BLD AUTO-RTO: 0 /100
PLATELET # BLD AUTO: 315 10E9/L (ref 150–450)
POTASSIUM SERPL-SCNC: 4.1 MMOL/L (ref 3.4–5.3)
PROT SERPL-MCNC: 7.7 G/DL (ref 6.8–8.8)
RBC # BLD AUTO: 4.94 10E12/L (ref 3.8–5.2)
SODIUM SERPL-SCNC: 138 MMOL/L (ref 133–144)
WBC # BLD AUTO: 18.1 10E9/L (ref 4–11)

## 2018-10-05 PROCEDURE — 36415 COLL VENOUS BLD VENIPUNCTURE: CPT | Performed by: FAMILY MEDICINE

## 2018-10-05 PROCEDURE — 96376 TX/PRO/DX INJ SAME DRUG ADON: CPT

## 2018-10-05 PROCEDURE — 72125 CT NECK SPINE W/O DYE: CPT | Mod: TC

## 2018-10-05 PROCEDURE — 96374 THER/PROPH/DIAG INJ IV PUSH: CPT

## 2018-10-05 PROCEDURE — 85610 PROTHROMBIN TIME: CPT | Performed by: FAMILY MEDICINE

## 2018-10-05 PROCEDURE — 25000128 H RX IP 250 OP 636: Performed by: FAMILY MEDICINE

## 2018-10-05 PROCEDURE — 99283 EMERGENCY DEPT VISIT LOW MDM: CPT | Performed by: FAMILY MEDICINE

## 2018-10-05 PROCEDURE — 73030 X-RAY EXAM OF SHOULDER: CPT | Mod: TC,LT

## 2018-10-05 PROCEDURE — 80053 COMPREHEN METABOLIC PANEL: CPT | Performed by: FAMILY MEDICINE

## 2018-10-05 PROCEDURE — 73120 X-RAY EXAM OF HAND: CPT | Mod: TC,RT

## 2018-10-05 PROCEDURE — 85730 THROMBOPLASTIN TIME PARTIAL: CPT | Performed by: FAMILY MEDICINE

## 2018-10-05 PROCEDURE — 99285 EMERGENCY DEPT VISIT HI MDM: CPT | Mod: 25

## 2018-10-05 PROCEDURE — 96375 TX/PRO/DX INJ NEW DRUG ADDON: CPT

## 2018-10-05 PROCEDURE — 85025 COMPLETE CBC W/AUTO DIFF WBC: CPT | Performed by: FAMILY MEDICINE

## 2018-10-05 PROCEDURE — 70450 CT HEAD/BRAIN W/O DYE: CPT | Mod: TC

## 2018-10-05 RX ORDER — FENTANYL CITRATE 50 UG/ML
50 INJECTION, SOLUTION INTRAMUSCULAR; INTRAVENOUS
Status: DISCONTINUED | OUTPATIENT
Start: 2018-10-05 | End: 2018-10-05 | Stop reason: HOSPADM

## 2018-10-05 RX ORDER — LORAZEPAM 2 MG/ML
1 INJECTION INTRAMUSCULAR ONCE
Status: COMPLETED | OUTPATIENT
Start: 2018-10-05 | End: 2018-10-05

## 2018-10-05 RX ORDER — HYDROMORPHONE HYDROCHLORIDE 1 MG/ML
0.5 INJECTION, SOLUTION INTRAMUSCULAR; INTRAVENOUS; SUBCUTANEOUS
Status: DISCONTINUED | OUTPATIENT
Start: 2018-10-05 | End: 2018-10-05 | Stop reason: HOSPADM

## 2018-10-05 RX ORDER — SODIUM PHOSPHATE,MONO-DIBASIC 19G-7G/118
1 ENEMA (ML) RECTAL 2 TIMES DAILY
COMMUNITY
End: 2023-05-15 | Stop reason: ALTCHOICE

## 2018-10-05 RX ORDER — FENTANYL CITRATE 50 UG/ML
50 INJECTION, SOLUTION INTRAMUSCULAR; INTRAVENOUS ONCE
Status: COMPLETED | OUTPATIENT
Start: 2018-10-05 | End: 2018-10-05

## 2018-10-05 RX ORDER — ONDANSETRON 2 MG/ML
4 INJECTION INTRAMUSCULAR; INTRAVENOUS ONCE
Status: COMPLETED | OUTPATIENT
Start: 2018-10-05 | End: 2018-10-05

## 2018-10-05 RX ORDER — SODIUM CHLORIDE 9 MG/ML
1000 INJECTION, SOLUTION INTRAVENOUS CONTINUOUS
Status: DISCONTINUED | OUTPATIENT
Start: 2018-10-05 | End: 2018-10-05 | Stop reason: HOSPADM

## 2018-10-05 RX ADMIN — SODIUM CHLORIDE 1000 ML: 9 INJECTION, SOLUTION INTRAVENOUS at 15:18

## 2018-10-05 RX ADMIN — FENTANYL CITRATE 50 MCG: 50 INJECTION INTRAMUSCULAR; INTRAVENOUS at 14:21

## 2018-10-05 RX ADMIN — LORAZEPAM 1 MG: 2 INJECTION INTRAMUSCULAR; INTRAVENOUS at 15:16

## 2018-10-05 RX ADMIN — LORAZEPAM 1 MG: 2 INJECTION INTRAMUSCULAR; INTRAVENOUS at 14:21

## 2018-10-05 RX ADMIN — ONDANSETRON 4 MG: 2 INJECTION, SOLUTION INTRAMUSCULAR; INTRAVENOUS at 12:16

## 2018-10-05 RX ADMIN — FENTANYL CITRATE 50 MCG: 50 INJECTION INTRAMUSCULAR; INTRAVENOUS at 12:16

## 2018-10-05 RX ADMIN — SODIUM CHLORIDE 1000 ML: 9 INJECTION, SOLUTION INTRAVENOUS at 12:16

## 2018-10-05 RX ADMIN — FENTANYL CITRATE 50 MCG: 50 INJECTION INTRAMUSCULAR; INTRAVENOUS at 13:43

## 2018-10-05 RX ADMIN — Medication 0.5 MG: at 16:46

## 2018-10-05 ASSESSMENT — ENCOUNTER SYMPTOMS
RESPIRATORY NEGATIVE: 1
CARDIOVASCULAR NEGATIVE: 1
CONSTITUTIONAL NEGATIVE: 1
NECK PAIN: 0
EYES NEGATIVE: 1
BACK PAIN: 0

## 2018-10-05 NOTE — ED NOTES
Patient arrives accompanied by family member for evaluation of a fall. Patient was at work this morning and tripped on some cords. Reports falling forward onto the front of her face. Contusion noted to left forehead. Denies loss of consciousness. Reporting a headache 6/10, no nausea or vomiting. Reporting left shoulder pain 8/10, as well as right thumb pain. CMS intact to left arm, no obvious deformity. Call light within reach.

## 2018-10-05 NOTE — ED TRIAGE NOTES
Fell landed on cement, hit head, has pain in left shoulder, head and thinks left leg has road rash.  No LOC.

## 2018-10-05 NOTE — ED NOTES
Patient aware of plan of care with no questions or concerns. Family at bedside. [facility] ambulance transferring patient to [facility]. Pain remains at 7/10. CMS intact to left extremity.

## 2018-10-05 NOTE — ED PROVIDER NOTES
History     Chief Complaint   Patient presents with     Fall     from standing hit cement floor, hit head, no LOC     HPI  Delores Mac is a 63 year old female who fell at work. She tripped, fell forward, and landed on her left shoulder. She hit the front of her head above her eye on the ground. Her had hurts, her left shoulder hurts, and the base of her right thumb hurts. She denies amnesia or LOC. She denies neck pain. Her left shoulder hurts with any movement of any part of her body. Staying still helps her pain.     Problem List:    There are no active problems to display for this patient.       Past Medical History:    History reviewed. No pertinent past medical history. She denies any chronic medical problems.    Past Surgical History:    Past Surgical History:   Procedure Laterality Date     BREAST LUMPECTOMY, RT/LT       HYSTERECTOMY       TOE SURGERY      end of toes cut off from bone infection.       Family History:    Family History   Problem Relation Age of Onset     Breast Cancer Mother      HEART DISEASE Brother        Social History:  Marital Status:   [2]  Social History   Substance Use Topics     Smoking status: Current Every Day Smoker     Packs/day: 1.00     Types: Cigarettes     Smokeless tobacco: Never Used     Alcohol use No        Medications:      glucosamine-chondroitin 500-400 MG CAPS per capsule   UNABLE TO FIND         Review of Systems   Constitutional: Negative.    HENT:        Headache on front of head   Eyes: Negative.    Respiratory: Negative.    Cardiovascular: Negative.    Genitourinary: Negative.    Musculoskeletal: Negative for back pain and neck pain.       Physical Exam   BP: (!) 181/76  Pulse: 91  Heart Rate: 94  Temp: 98.5  F (36.9  C)  SpO2: 97 %      Physical Exam   Constitutional: She is oriented to person, place, and time. She appears well-developed and well-nourished. She appears distressed.   HENT:   Abrasion on forehead above left eye that is dry. Minimal  swelling. No blood behind ears or TMs. Exam of HENT otherwise normal.   Eyes: Conjunctivae and EOM are normal. Pupils are equal, round, and reactive to light.   Neck: Normal range of motion. Neck supple.   Cardiovascular: Normal rate and regular rhythm.    Pulmonary/Chest: Effort normal and breath sounds normal.   Abdominal: Soft. There is no tenderness.   Musculoskeletal:   Tenderness with palpation of the left glenohumeral joint anteriorly and posteriorly. Tenderness with palpation of the right thumb MCP joint.   Neurological: She is alert and oriented to person, place, and time.   Skin: Skin is warm and dry. She is not diaphoretic.   Psychiatric: She has a normal mood and affect. Her behavior is normal. Judgment and thought content normal.   Nursing note and vitals reviewed.      ED Course     Procedures      Results for orders placed or performed during the hospital encounter of 10/05/18 (from the past 24 hour(s))   Comprehensive metabolic panel   Result Value Ref Range    Sodium 138 133 - 144 mmol/L    Potassium 4.1 3.4 - 5.3 mmol/L    Chloride 107 94 - 109 mmol/L    Carbon Dioxide 25 20 - 32 mmol/L    Anion Gap 6 3 - 14 mmol/L    Glucose 104 (H) 70 - 99 mg/dL    Urea Nitrogen 15 7 - 30 mg/dL    Creatinine 0.72 0.52 - 1.04 mg/dL    GFR Estimate 82 >60 mL/min/1.7m2    GFR Estimate If Black >90 >60 mL/min/1.7m2    Calcium 8.8 8.5 - 10.1 mg/dL    Bilirubin Total 0.3 0.2 - 1.3 mg/dL    Albumin 3.7 3.4 - 5.0 g/dL    Protein Total 7.7 6.8 - 8.8 g/dL    Alkaline Phosphatase 117 40 - 150 U/L    ALT 33 0 - 50 U/L    AST 13 0 - 45 U/L   Partial thromboplastin time   Result Value Ref Range    PTT 24 24.00 - 37.00 sec   INR   Result Value Ref Range    INR 1.03 0.80 - 1.20   CBC with platelets differential   Result Value Ref Range    WBC 18.1 (H) 4.0 - 11.0 10e9/L    RBC Count 4.94 3.8 - 5.2 10e12/L    Hemoglobin 15.1 11.7 - 15.7 g/dL    Hematocrit 45.0 35.0 - 47.0 %    MCV 91 78 - 100 fl    MCH 30.6 26.5 - 33.0 pg    MCHC  33.6 31.5 - 36.5 g/dL    RDW 13.5 10.0 - 15.0 %    Platelet Count 315 150 - 450 10e9/L    Diff Method Automated Method     % Neutrophils 85.5 %    % Lymphocytes 8.3 %    % Monocytes 5.2 %    % Eosinophils 0.2 %    % Basophils 0.3 %    % Immature Granulocytes 0.5 %    Nucleated RBCs 0 0 /100    Absolute Neutrophil 15.5 (H) 1.6 - 8.3 10e9/L    Absolute Lymphocytes 1.5 0.8 - 5.3 10e9/L    Absolute Monocytes 1.0 0.0 - 1.3 10e9/L    Absolute Eosinophils 0.0 0.0 - 0.7 10e9/L    Absolute Basophils 0.1 0.0 - 0.2 10e9/L    Abs Immature Granulocytes 0.1 0 - 0.4 10e9/L    Absolute Nucleated RBC 0.0    CT Head w/o Contrast    Narrative    PROCEDURE: CT HEAD W/O CONTRAST     HISTORY: fall with frontal head injury; also has probable distracting  shoulder injury; .    COMPARISON: None.    TECHNIQUE:  Helical images of the head from the foramen magnum to the  vertex were obtained without contrast.    FINDINGS: The ventricles and sulci are normal in volume. No acute  intracranial hemorrhage, mass effect, midline shift, hydrocephalus or  basilar cystern effacement are present.    The grey-white matter interface is preserved.    The calvarium is intact. The mastoid air cells are clear.  The  visualized paranasal sinuses are clear. There is a left frontal scalp  hematoma noted.      Impression    IMPRESSION: Normal brain      LAURENCE CRUMP MD   CT Cervical Spine w/o Contrast    Narrative    PROCEDURE: CT CERVICAL SPINE W/O CONTRAST 10/5/2018 12:53 PM    HISTORY: fall with frontal head injury; also has probable distracting  shoulder injury;     COMPARISONS: None.    Meds/Dose Given:    TECHNIQUE: CT scan cervical spine with sagittal and coronal  reconstructions.    FINDINGS: The C1 C2 joints appear normal. C2 C3 C3 C4 and C4-C5 discs  all appear normal. There is decrease in height in the C5-C6 and C6-C7  discs with anterior osteophyte formation. The C7-T1 disc appears  normal. There are advanced facet joint degenerative changes noted  at  C3-C4 on the right there are mild facet joint degenerative changes at  C4-C5 on the left. The prevertebral soft tissues appear normal.           Impression    IMPRESSION: No acute cervical fracture    LAURENCE CRUMP MD   XR Hand Right 2 Views    Narrative    PROCEDURE:  XR HAND RT 2 VW    HISTORY: fall with frontal head injury; also has probable distracting  shoulder injury;     COMPARISON:  None.    TECHNIQUE:  2 views of the right hand were obtained.    FINDINGS:  No fracture or dislocation is identified. The joint spaces  are preserved.  IV tubing and the patient's ring obscure underlying  detail.      Impression    IMPRESSION: No acute fracture.      MANINDER RAMIREZ MD   XR Shoulder Left 2 Views    Narrative    PROCEDURE:  XR SHOULDER 2 VIEW LEFT    HISTORY: shoulder fracture versus dislocation suspect;     COMPARISON:  None.    TECHNIQUE:  2 views of the left shoulder were obtained.    FINDINGS:  There is a comminuted fracture of the left humeral neck and  head. There is a transverse component in the humeral neck and a  vertical component extending to the greater tuberosity. There is  impaction at the fracture site and displacement of the greater  tuberosity laterally by approximately 1.5 cm. No dislocation.       Impression    IMPRESSION: Comminuted left proximal humerus fracture.      MANINDER RAMIREZ MD       Medications   0.9% sodium chloride BOLUS (0 mLs Intravenous Stopped 10/5/18 1359)     Followed by   sodium chloride 0.9% infusion (not administered)   fentaNYL (PF) (SUBLIMAZE) injection 50 mcg (50 mcg Intravenous Given 10/5/18 1343)   LORazepam (ATIVAN) injection 1 mg (not administered)   ondansetron (ZOFRAN) injection 4 mg (4 mg Intravenous Given 10/5/18 1216)   LORazepam (ATIVAN) injection 1 mg (1 mg Intravenous Given 10/5/18 1421)   fentaNYL (PF) (SUBLIMAZE) injection 50 mcg (50 mcg Intravenous Given 10/5/18 1421)       Assessments & Plan (with Medical Decision Making)     I have reviewed the  nursing notes.    I have reviewed the findings, diagnosis, plan and need for follow up with the patient.     Final diagnoses:   Comminuted left humeral fracture, closed, initial encounter     Discussed care with Orthopedic Surgeon, Ed Park MD at [facility] in [place]. He accepted her to his service at 300 pm. She will be transferred to [facility] by ambulance for surgical repair of this fracture.    10/5/2018   [facility]     Shay Hsu DO  10/05/18 1514

## 2018-10-07 ENCOUNTER — HOSPITAL ENCOUNTER (EMERGENCY)
Facility: HOSPITAL | Age: 63
Discharge: HOME | End: 2018-10-07
Attending: PHYSICIAN ASSISTANT | Admitting: PHYSICIAN ASSISTANT
Payer: OTHER MISCELLANEOUS

## 2018-10-07 VITALS
SYSTOLIC BLOOD PRESSURE: 169 MMHG | OXYGEN SATURATION: 97 % | TEMPERATURE: 99.1 F | DIASTOLIC BLOOD PRESSURE: 99 MMHG | RESPIRATION RATE: 16 BRPM

## 2018-10-07 DIAGNOSIS — S42.302S: ICD-10-CM

## 2018-10-07 PROCEDURE — 99213 OFFICE O/P EST LOW 20 MIN: CPT | Performed by: PHYSICIAN ASSISTANT

## 2018-10-07 PROCEDURE — 96372 THER/PROPH/DIAG INJ SC/IM: CPT

## 2018-10-07 PROCEDURE — 25000128 H RX IP 250 OP 636: Performed by: PHYSICIAN ASSISTANT

## 2018-10-07 PROCEDURE — G0463 HOSPITAL OUTPT CLINIC VISIT: HCPCS | Mod: 25

## 2018-10-07 RX ORDER — FENTANYL CITRATE 50 UG/ML
50 INJECTION, SOLUTION INTRAMUSCULAR; INTRAVENOUS ONCE
Status: COMPLETED | OUTPATIENT
Start: 2018-10-07 | End: 2018-10-07

## 2018-10-07 RX ADMIN — FENTANYL CITRATE 50 MCG: 50 INJECTION INTRAMUSCULAR; INTRAVENOUS at 18:07

## 2018-10-07 ASSESSMENT — ENCOUNTER SYMPTOMS
PSYCHIATRIC NEGATIVE: 1
NEUROLOGICAL NEGATIVE: 1
CONSTITUTIONAL NEGATIVE: 1
ARTHRALGIAS: 1
CARDIOVASCULAR NEGATIVE: 1

## 2018-10-07 NOTE — ED PROVIDER NOTES
History     Chief Complaint   Patient presents with     Medication Refill     The history is provided by the patient. No  was used.     Delores Mac is a 63 year old female who slipped at work 2 days ago and fractured her left humerus. She came here and was transferred to [facility] for surgery, but this has not happened yet. Pt is out of Dilaudid. States it is not helping her pain. Requests a different type of pain medication. I made sure pt and her daughter that tomorrow they need to f/u with her pcp or Orthopedic surgeon for all further pain medication.        Past Medical History:    L      Past Surgical History:    Past Surgical History:   Procedure Laterality Date     BREAST LUMPECTOMY, RT/LT       HYSTERECTOMY       TOE SURGERY      end of toes cut off from bone infection.       Family History:    Family History   Problem Relation Age of Onset     Breast Cancer Mother      HEART DISEASE Brother        Social History:  Marital Status:   [2]  Social History   Substance Use Topics     Smoking status: Current Every Day Smoker     Packs/day: 1.00     Types: Cigarettes     Smokeless tobacco: Never Used     Alcohol use No        Medications:      glucosamine-chondroitin 500-400 MG CAPS per capsule   UNABLE TO FIND         Review of Systems   Constitutional: Negative.    HENT: Negative.    Cardiovascular: Negative.    Musculoskeletal: Positive for arthralgias.   Neurological: Negative.    Psychiatric/Behavioral: Negative.        Physical Exam   BP: 169/99  Temp: 99.1  F (37.3  C)  Resp: 16  SpO2: 97 %      Physical Exam   Constitutional: She is oriented to person, place, and time. She appears well-developed and well-nourished. No distress.   Cardiovascular: Normal rate.    Pulmonary/Chest: Effort normal.   Musculoskeletal:   Left arm in sling with cushion. Vascular intact   Neurological: She is alert and oriented to person, place, and time.   Skin: She is not diaphoretic.   Psychiatric:  She has a normal mood and affect.   Nursing note and vitals reviewed.      ED Course     ED Course     Procedures              Medications   oxyCODONE (ROXICODONE) 5 mg 6 tablet ED starter pack 5 mg (5 mg Oral Not Given 10/7/18 1813)   fentaNYL (PF) (SUBLIMAZE) injection 50 mcg (50 mcg Intramuscular Given 10/7/18 1807)     Pt observed x 20 minutes. Pt tolerated well.    Assessments & Plan (with Medical Decision Making)     I have reviewed the nursing notes.    I have reviewed the findings, diagnosis, plan and need for follow up with the patient.      Final diagnoses:   Comminuted left humeral fracture, sequela         Percocet 5 mg take 1 tab po Q$H prn pain. 6 tabs 0RF    Patient verbally educated and given appropriate education sheets for the diagnoses and has no questions.  Take medications as directed.   Follow up with Dr Park or your Primary Care provider for all further pain medication refills.   if further concerns develop, return to the ER  Suellen Medellin Certified  Physician Assistant  10/7/2018  6:17 PM  URGENT CARE CLINIC      10/7/2018   [facility]     Suellen Medellin PA  10/07/18 1914

## 2018-10-07 NOTE — ED AVS SNAPSHOT
[facility]    [address] [address]    Phone:  [phone]                                       Delores Mac   MRN: [id number]    Department:  [facility]   Date of Visit:  10/7/2018           Patient Information     Date Of Birth          1955        Your diagnoses for this visit were:     Comminuted left humeral fracture, sequela        You were seen by Suellen Medellin PA.      Follow-up Information     Follow up with Ed Park    Why:  or Dr. Goodrich for all further pain medication refills    Contact information:    [facility]  [address] [facility] [address] [phone]          Follow up with [facility].    Specialty:  EMERGENCY MEDICINE    Why:  If further concerns develop    Contact information:    [address] [address] [phone]    Additional information:    From [place]: Take [address]. Turn left at [address]. Turn left at the first stoplight on [address]. At the first stop sign, take a right onto [address]. Take a left into the parking lot and continue through until you reach the North enterance of the building.       From [place]: Take [address]. Take the [address] ramp towards [place]. Turn left onto [address]. Take a slight right onto [address]. Turn left at the first stoplight on [address]. At the first stop sign, take a right onto [address]. Take a left into the parking lot and continue through until you reach the North enterance of the building.       From [place]: Take [address]. Take a right at [address]. At the first stop sign, take a right onto [address]. Take a left into the parking lot and continue through until you reach the North enterance of the building.         Discharge Instructions       Percocet 5 mg take 1 tablet every 4-6 hours as needed for pain. 6  "tablets    Discharge References/Attachments     BONES, HOW THEY HEAL (ENGLISH)    HUMERUS FRACTURE, UNDERSTANDING (ENGLISH)         Review of your medicines      Our records show that you are taking the medicines listed below. If these are incorrect, please call your family doctor or clinic.        Dose / Directions Last dose taken    glucosamine-chondroitin 500-400 MG Caps per capsule   Dose:  1 capsule        Take 1 capsule by mouth 2 times daily   Refills:  0        UNABLE TO FIND        MEDICATION NAME: OTC Sinus tab   Took 2 tabs this am.   Refills:  0                Orders Needing Specimen Collection     None      Pending Results     No orders found from 10/5/2018 to 10/8/2018.            Pending Culture Results     No orders found from 10/5/2018 to 10/8/2018.            Thank you for choosing [facility]       Thank you for choosing [facility] for your care. Our goal is always to provide you with excellent care. Hearing back from our patients is one way we can continue to improve our services. Please take a few minutes to complete the written survey that you may receive in the mail after you visit with us. Thank you!        [employer] Information     [employer] lets you send messages to your doctor, view your test results, renew your prescriptions, schedule appointments and more. To sign up, go to [email or web address] . Click on \"Log in\" on the left side of the screen, which will take you to the Welcome page. Then click on \"Sign up Now\" on the right side of the page.     You will be asked to enter the access code listed below, as well as some personal information. Please follow the directions to create your username and password.     Your access code is: [email or web address]  Expires: 2019  6:13 PM     Your access code will  in 90 days. If you need help or a new code, please call your [facility] clinic or [phone].        Care EveryWhere ID     This is your Care EveryWhere ID. This could be used by other " organizations to access your [facility] medical records  [id number]        Equal Access to Services     RONNIE ESPINOZA : Shasta Steele, mesfin gamboa, stefan guzman. So [facility] [phone].    ATENCIÓN: Si habla español, tiene a massey disposición servicios gratuitos de asistencia lingüística. Llame al [phone].    We comply with applicable federal civil rights laws and [place] laws. We do not discriminate on the basis of race, color, national origin, age, disability, sex, sexual orientation, or gender identity.            After Visit Summary       This is your record. Keep this with you and show to your community pharmacist(s) and doctor(s) at your next visit.

## 2018-10-07 NOTE — ED AVS SNAPSHOT
[facility]    [address] [place] [address]    Phone:  [phone]                                       Delores Mac   MRN: [id number]    Department:  [facility]   Date of Visit:  10/7/2018           After Visit Summary Signature Page     I have received my discharge instructions, and my questions have been answered. I have discussed any challenges I see with this plan with the nurse or doctor.    ..........................................................................................................................................  Patient/Patient Representative Signature      ..........................................................................................................................................  Patient Representative Print Name and Relationship to Patient    ..................................................               ................................................  Date                                   Time    ..........................................................................................................................................  Reviewed by Signature/Title    ...................................................              ..............................................  Date                                               Time          22EPIC Rev 08/18

## 2018-10-07 NOTE — ED TRIAGE NOTES
Pt presents with c/o needing more pain meds to get her through until she can have her shoulder worked on.

## 2018-10-07 NOTE — ED TRIAGE NOTES
Pt presents today with a family member for c/o pain and is currently taking Dilaudid, tylenol and ibuprofen and says the dilaudid isn't helping.

## 2018-10-08 ENCOUNTER — HOSPITAL ENCOUNTER (OUTPATIENT)
Dept: CT IMAGING | Facility: HOSPITAL | Age: 63
Discharge: HOME | End: 2018-10-08
Attending: ORTHOPAEDIC SURGERY | Admitting: ORTHOPAEDIC SURGERY
Payer: OTHER MISCELLANEOUS

## 2018-10-08 DIAGNOSIS — S42.202A CLOSED FRACTURE OF PROXIMAL END OF LEFT HUMERUS: ICD-10-CM

## 2018-10-08 PROCEDURE — 73200 CT UPPER EXTREMITY W/O DYE: CPT | Mod: TC,LT

## 2019-04-17 ENCOUNTER — TRANSFERRED RECORDS (OUTPATIENT)
Dept: HEALTH INFORMATION MANAGEMENT | Facility: CLINIC | Age: 64
End: 2019-04-17

## 2020-03-02 ENCOUNTER — HEALTH MAINTENANCE LETTER (OUTPATIENT)
Age: 65
End: 2020-03-02

## 2021-03-18 PROBLEM — S42.202A CLOSED FRACTURE OF LEFT PROXIMAL HUMERUS: Status: ACTIVE | Noted: 2018-10-05

## 2021-04-18 ENCOUNTER — HEALTH MAINTENANCE LETTER (OUTPATIENT)
Age: 66
End: 2021-04-18

## 2021-10-03 ENCOUNTER — HEALTH MAINTENANCE LETTER (OUTPATIENT)
Age: 66
End: 2021-10-03

## 2022-03-19 ENCOUNTER — HEALTH MAINTENANCE LETTER (OUTPATIENT)
Age: 67
End: 2022-03-19

## 2022-05-14 ENCOUNTER — HEALTH MAINTENANCE LETTER (OUTPATIENT)
Age: 67
End: 2022-05-14

## 2022-09-04 ENCOUNTER — HEALTH MAINTENANCE LETTER (OUTPATIENT)
Age: 67
End: 2022-09-04

## 2023-05-12 PROBLEM — M25.512 ACUTE PAIN OF LEFT SHOULDER: Status: ACTIVE | Noted: 2018-12-20

## 2023-05-12 PROBLEM — M25.612 SHOULDER STIFFNESS, LEFT: Status: ACTIVE | Noted: 2018-12-20

## 2023-05-12 PROBLEM — S42.292P: Status: ACTIVE | Noted: 2018-10-05

## 2023-05-12 PROBLEM — M62.81 MUSCLE WEAKNESS: Status: ACTIVE | Noted: 2018-12-20

## 2023-05-12 RX ORDER — PSEUDOEPHEDRINE HCL 120 MG/1
1 TABLET, FILM COATED, EXTENDED RELEASE ORAL
COMMUNITY
End: 2023-05-15

## 2023-05-15 ENCOUNTER — OFFICE VISIT (OUTPATIENT)
Dept: FAMILY MEDICINE | Facility: OTHER | Age: 68
End: 2023-05-15
Attending: STUDENT IN AN ORGANIZED HEALTH CARE EDUCATION/TRAINING PROGRAM
Payer: MEDICARE

## 2023-05-15 VITALS
HEART RATE: 93 BPM | RESPIRATION RATE: 16 BRPM | WEIGHT: 209 LBS | OXYGEN SATURATION: 96 % | DIASTOLIC BLOOD PRESSURE: 80 MMHG | SYSTOLIC BLOOD PRESSURE: 140 MMHG | BODY MASS INDEX: 29.99 KG/M2

## 2023-05-15 DIAGNOSIS — R74.8 ELEVATED ALKALINE PHOSPHATASE LEVEL: ICD-10-CM

## 2023-05-15 DIAGNOSIS — Z23 ENCOUNTER FOR IMMUNIZATION: ICD-10-CM

## 2023-05-15 DIAGNOSIS — Z83.49 FAMILY HISTORY OF THYROID DISEASE: ICD-10-CM

## 2023-05-15 DIAGNOSIS — Z90.710 S/P TOTAL HYSTERECTOMY: ICD-10-CM

## 2023-05-15 DIAGNOSIS — Z76.89 ENCOUNTER TO ESTABLISH CARE: Primary | ICD-10-CM

## 2023-05-15 DIAGNOSIS — Z00.00 LABORATORY EXAMINATION ORDERED AS PART OF A ROUTINE GENERAL MEDICAL EXAMINATION: ICD-10-CM

## 2023-05-15 DIAGNOSIS — R73.01 ELEVATED FASTING GLUCOSE: ICD-10-CM

## 2023-05-15 DIAGNOSIS — Z12.31 BREAST CANCER SCREENING BY MAMMOGRAM: ICD-10-CM

## 2023-05-15 DIAGNOSIS — F17.200 TOBACCO DEPENDENCE: ICD-10-CM

## 2023-05-15 DIAGNOSIS — Z13.1 SCREENING FOR DIABETES MELLITUS: ICD-10-CM

## 2023-05-15 DIAGNOSIS — Z87.891 PERSONAL HISTORY OF TOBACCO USE: ICD-10-CM

## 2023-05-15 DIAGNOSIS — R03.0 ELEVATED BLOOD PRESSURE READING: ICD-10-CM

## 2023-05-15 DIAGNOSIS — R19.5 LOOSE STOOLS: ICD-10-CM

## 2023-05-15 DIAGNOSIS — Z23 NEED FOR DIPHTHERIA-TETANUS-PERTUSSIS (TDAP) VACCINE: ICD-10-CM

## 2023-05-15 DIAGNOSIS — R10.9 ABDOMINAL CRAMPING: ICD-10-CM

## 2023-05-15 DIAGNOSIS — Z13.220 SCREENING FOR LIPID DISORDERS: ICD-10-CM

## 2023-05-15 PROBLEM — S42.292P: Status: RESOLVED | Noted: 2018-10-05 | Resolved: 2023-05-15

## 2023-05-15 PROBLEM — M25.612 SHOULDER STIFFNESS, LEFT: Status: RESOLVED | Noted: 2018-12-20 | Resolved: 2023-05-15

## 2023-05-15 LAB
ALBUMIN SERPL BCG-MCNC: 4.3 G/DL (ref 3.5–5.2)
ALP SERPL-CCNC: 141 U/L (ref 35–104)
ALT SERPL W P-5'-P-CCNC: 29 U/L (ref 10–35)
ANION GAP SERPL CALCULATED.3IONS-SCNC: 14 MMOL/L (ref 7–15)
AST SERPL W P-5'-P-CCNC: 23 U/L (ref 10–35)
BASOPHILS # BLD AUTO: 0.1 10E3/UL (ref 0–0.2)
BASOPHILS NFR BLD AUTO: 1 %
BILIRUB SERPL-MCNC: 0.3 MG/DL
BUN SERPL-MCNC: 17.2 MG/DL (ref 8–23)
CALCIUM SERPL-MCNC: 9.6 MG/DL (ref 8.8–10.2)
CHLORIDE SERPL-SCNC: 101 MMOL/L (ref 98–107)
CHOLEST SERPL-MCNC: 276 MG/DL
CREAT SERPL-MCNC: 0.8 MG/DL (ref 0.51–0.95)
DEPRECATED HCO3 PLAS-SCNC: 23 MMOL/L (ref 22–29)
EOSINOPHIL # BLD AUTO: 0.2 10E3/UL (ref 0–0.7)
EOSINOPHIL NFR BLD AUTO: 2 %
ERYTHROCYTE [DISTWIDTH] IN BLOOD BY AUTOMATED COUNT: 13.2 % (ref 10–15)
EST. AVERAGE GLUCOSE BLD GHB EST-MCNC: 108 MG/DL
GFR SERPL CREATININE-BSD FRML MDRD: 80 ML/MIN/1.73M2
GLUCOSE SERPL-MCNC: 111 MG/DL (ref 70–99)
HBA1C MFR BLD: 5.4 %
HCT VFR BLD AUTO: 45.6 % (ref 35–47)
HDLC SERPL-MCNC: 51 MG/DL
HGB BLD-MCNC: 15.2 G/DL (ref 11.7–15.7)
IMM GRANULOCYTES # BLD: 0 10E3/UL
IMM GRANULOCYTES NFR BLD: 0 %
LDLC SERPL CALC-MCNC: 172 MG/DL
LYMPHOCYTES # BLD AUTO: 1.9 10E3/UL (ref 0.8–5.3)
LYMPHOCYTES NFR BLD AUTO: 17 %
MCH RBC QN AUTO: 31.1 PG (ref 26.5–33)
MCHC RBC AUTO-ENTMCNC: 33.3 G/DL (ref 31.5–36.5)
MCV RBC AUTO: 93 FL (ref 78–100)
MONOCYTES # BLD AUTO: 0.9 10E3/UL (ref 0–1.3)
MONOCYTES NFR BLD AUTO: 8 %
NEUTROPHILS # BLD AUTO: 8.2 10E3/UL (ref 1.6–8.3)
NEUTROPHILS NFR BLD AUTO: 72 %
NONHDLC SERPL-MCNC: 225 MG/DL
NRBC # BLD AUTO: 0 10E3/UL
NRBC BLD AUTO-RTO: 0 /100
PLATELET # BLD AUTO: 276 10E3/UL (ref 150–450)
POTASSIUM SERPL-SCNC: 4.3 MMOL/L (ref 3.4–5.3)
PROT SERPL-MCNC: 7.3 G/DL (ref 6.4–8.3)
RBC # BLD AUTO: 4.89 10E6/UL (ref 3.8–5.2)
SODIUM SERPL-SCNC: 138 MMOL/L (ref 136–145)
TRIGL SERPL-MCNC: 265 MG/DL
TSH SERPL DL<=0.005 MIU/L-ACNC: 1.87 UIU/ML (ref 0.3–4.2)
WBC # BLD AUTO: 11.3 10E3/UL (ref 4–11)

## 2023-05-15 PROCEDURE — 85025 COMPLETE CBC W/AUTO DIFF WBC: CPT | Mod: ZL | Performed by: STUDENT IN AN ORGANIZED HEALTH CARE EDUCATION/TRAINING PROGRAM

## 2023-05-15 PROCEDURE — 80061 LIPID PANEL: CPT | Mod: ZL | Performed by: STUDENT IN AN ORGANIZED HEALTH CARE EDUCATION/TRAINING PROGRAM

## 2023-05-15 PROCEDURE — 90677 PCV20 VACCINE IM: CPT

## 2023-05-15 PROCEDURE — 36415 COLL VENOUS BLD VENIPUNCTURE: CPT | Mod: ZL | Performed by: STUDENT IN AN ORGANIZED HEALTH CARE EDUCATION/TRAINING PROGRAM

## 2023-05-15 PROCEDURE — 80053 COMPREHEN METABOLIC PANEL: CPT | Mod: ZL | Performed by: STUDENT IN AN ORGANIZED HEALTH CARE EDUCATION/TRAINING PROGRAM

## 2023-05-15 PROCEDURE — 82310 ASSAY OF CALCIUM: CPT | Mod: ZL | Performed by: STUDENT IN AN ORGANIZED HEALTH CARE EDUCATION/TRAINING PROGRAM

## 2023-05-15 PROCEDURE — 84443 ASSAY THYROID STIM HORMONE: CPT | Mod: ZL | Performed by: STUDENT IN AN ORGANIZED HEALTH CARE EDUCATION/TRAINING PROGRAM

## 2023-05-15 PROCEDURE — G0463 HOSPITAL OUTPT CLINIC VISIT: HCPCS | Mod: 25

## 2023-05-15 PROCEDURE — G0296 VISIT TO DETERM LDCT ELIG: HCPCS | Performed by: STUDENT IN AN ORGANIZED HEALTH CARE EDUCATION/TRAINING PROGRAM

## 2023-05-15 PROCEDURE — 82977 ASSAY OF GGT: CPT | Mod: ZL | Performed by: STUDENT IN AN ORGANIZED HEALTH CARE EDUCATION/TRAINING PROGRAM

## 2023-05-15 PROCEDURE — 83036 HEMOGLOBIN GLYCOSYLATED A1C: CPT | Mod: ZL | Performed by: STUDENT IN AN ORGANIZED HEALTH CARE EDUCATION/TRAINING PROGRAM

## 2023-05-15 PROCEDURE — 90472 IMMUNIZATION ADMIN EACH ADD: CPT

## 2023-05-15 PROCEDURE — 99203 OFFICE O/P NEW LOW 30 MIN: CPT | Performed by: STUDENT IN AN ORGANIZED HEALTH CARE EDUCATION/TRAINING PROGRAM

## 2023-05-15 RX ORDER — MAGNESIUM CARB/ALUMINUM HYDROX 105-160MG
1 TABLET,CHEWABLE ORAL 2 TIMES DAILY
COMMUNITY

## 2023-05-15 ASSESSMENT — PATIENT HEALTH QUESTIONNAIRE - PHQ9
10. IF YOU CHECKED OFF ANY PROBLEMS, HOW DIFFICULT HAVE THESE PROBLEMS MADE IT FOR YOU TO DO YOUR WORK, TAKE CARE OF THINGS AT HOME, OR GET ALONG WITH OTHER PEOPLE: NOT DIFFICULT AT ALL
SUM OF ALL RESPONSES TO PHQ QUESTIONS 1-9: 5
SUM OF ALL RESPONSES TO PHQ QUESTIONS 1-9: 5

## 2023-05-15 ASSESSMENT — ANXIETY QUESTIONNAIRES
GAD7 TOTAL SCORE: 0
5. BEING SO RESTLESS THAT IT IS HARD TO SIT STILL: NOT AT ALL
6. BECOMING EASILY ANNOYED OR IRRITABLE: NOT AT ALL
2. NOT BEING ABLE TO STOP OR CONTROL WORRYING: NOT AT ALL
4. TROUBLE RELAXING: NOT AT ALL
3. WORRYING TOO MUCH ABOUT DIFFERENT THINGS: NOT AT ALL
IF YOU CHECKED OFF ANY PROBLEMS ON THIS QUESTIONNAIRE, HOW DIFFICULT HAVE THESE PROBLEMS MADE IT FOR YOU TO DO YOUR WORK, TAKE CARE OF THINGS AT HOME, OR GET ALONG WITH OTHER PEOPLE: NOT DIFFICULT AT ALL
7. FEELING AFRAID AS IF SOMETHING AWFUL MIGHT HAPPEN: NOT AT ALL
8. IF YOU CHECKED OFF ANY PROBLEMS, HOW DIFFICULT HAVE THESE MADE IT FOR YOU TO DO YOUR WORK, TAKE CARE OF THINGS AT HOME, OR GET ALONG WITH OTHER PEOPLE?: NOT DIFFICULT AT ALL
7. FEELING AFRAID AS IF SOMETHING AWFUL MIGHT HAPPEN: NOT AT ALL
1. FEELING NERVOUS, ANXIOUS, OR ON EDGE: NOT AT ALL

## 2023-05-15 NOTE — PATIENT INSTRUCTIONS
You are due for your mammogram. You can call the [facility] at [phone] to schedule an appointment or you can also schedule through [employer].      A referral was placed for you to have a colonoscopy. it is a diagnostic (more urgent) colonoscopy, please call after one week. Please call our health unit coordinator at [phone].     You are due for your shingles shots. Insurance does not cover the shots if done at the clinic. I would recommend going to a local pharmacy to obtain your shingles shots. It is a series of 2 shots, given now and then a second dose repeated in 2 to 6 months. It is recommended to get this shot even if you've had shingles in the past. You may experience fatigue, body aches, and arm pain for a day or so after the shot, so I recommend getting the shot when you do not have an important event going on.       *PLEASE CHECK YOUR BLOOD PRESSURE AT HOME AT ONCE PER DAY AT LEAST 5 DAYS PER WEEK. Write down these numbers and bring to your next appointment. It is best to alternate the time of day you check your blood pressure.     How do I check my blood pressure at home?  Once you have a home blood pressure meter, your doctor or nurse should check it to make sure it fits you and works correctly.  When it's time to check your blood pressure:  ?Go to the bathroom and empty your bladder first. Having a full bladder can temporarily increase your blood pressure, making the results inaccurate.  ?Sit in a chair with your feet flat on the ground.  ?Try to breathe normally and stay calm.  ?Attach the cuff to your arm. Place the cuff directly on your skin, not over your clothing. The cuff should be tight enough to not slip down, but not uncomfortably tight.  ?Sit and relax for about 3 to 5 minutes with the cuff on.  ?Follow the directions that came with your device to start measuring your blood pressure. This might involve squeezing the bulb at the end of the tube to inflate the cuff (fill  "it with air). With some monitors, you just need to press a button to inflate the cuff. When the cuff fills with air, it feels like someone is squeezing your arm, but it should not hurt. Then you will slowly deflate the cuff (let the air out of it), or it will deflate by itself. The screen or dial will show your blood pressure numbers.  ?Stay seated and relax for 1 minute, then measure your blood pressure again.    --------------------------------------  What is high blood pressure?  High blood pressure is a condition that puts you at risk for heart attack, stroke, and kidney disease. It does NOT usually cause symptoms. But it can be serious.  When your doctor or nurse tells you your blood pressure, they will say 2 numbers. For instance, your doctor or nurse might say that your blood pressure is \"130 over 80.\" The top number is the pressure inside your arteries when your heart is alejandro. The bottom number is the pressure inside your arteries when your heart is relaxed.  \"Elevated blood pressure\" is a term doctors or nurses use as a warning. People with elevated blood pressure do not yet have high blood pressure. But their blood pressure is not as low as it should be for good health.  Many experts define high, elevated, and normal blood pressure as follows, although this will vary based on your age.   ?High - Top number of 130 or above and/or bottom number of 80 or above  ?Elevated - Top number between 120 and 129 and bottom number of 79 or below  ?Normal - Top number of 119 or below and bottom number of 79 or below    What is a high blood pressure emergency?  A high blood pressure emergency is a serious - and even life-threatening - condition that can happen when a person's blood pressure gets much higher than normal. When a person's blood pressure gets very high, it can lead to problems in one or more of the following organs:  ?Eyes - Problems can include bleeding in the back of the eye, or swelling of the " "nerve that runs from the eye to the brain.  ?Brain - Problems can include swelling or bleeding in the brain, or a stroke. A stroke is when a part of the brain is damaged because of a problem with blood flow.  ?Kidneys - Very high blood pressure can lead to kidney failure, which is when the kidneys stop working.  ?Heart - Heart problems can include a heart attack, heart failure, or damage to a major blood vessel.  When your doctor or nurse tells you your blood pressure, they say 2 numbers. For example, your doctor might say that your blood pressure is \"140 over 90.\" When people have a high blood pressure emergency, their blood pressure is usually \"180 over 120\" or higher.    Other terms doctors might use for a high blood pressure emergency are \"hypertensive emergency\" or \"malignant hypertension.\"  Sometimes, a person's blood pressure is much higher than normal, but it hasn't damaged any organs. Doctors call this \"hypertensive urgency.\" Hypertensive urgency is not usually treated the same as a high blood pressure emergency.    What are the symptoms of a high blood pressure emergency?  The symptoms depend on the organ or organs affected. They can include:  ?Blurry vision or other vision changes  ?Headache  ?Nausea or vomiting  ?Confusion  ?Passing out or seizures - Seizures are waves of abnormal electrical activity in the brain that can make people move or behave strangely.  ?Weakness or numbness on one side of the body, or in one arm or leg  ?Difficulty talking  ?Trouble breathing  ?Chest pain  ?Pain in the upper back or between the shoulders  ?Urine that is brown or bloody  ?Pain in the lower back or on the side of the body  Should I see a doctor or nurse?  Yes. Call your doctor or nurse right away ([phone]) if you have any of the symptoms listed above, especially if you know that you have high blood pressure. If you symptoms are chest pain, seizures, difficulty breathing or a very bad headache, you should be " seen right away, in the Emergency department.     How can I lower my blood pressure?  If your doctor or nurse has prescribed blood pressure medicine, the most important thing you can do is to take it. If it causes side effects, do not just stop taking it. Instead, talk to your doctor or nurse about the problems it causes. They might be able to lower your dose or switch you to another medicine. If cost is a problem, mention that too. They might be able to put you on a less expensive medicine. Taking your blood pressure medicine can keep you from having a heart attack or stroke, and it can save your life!     In susceptible individuals, nonsteroidal antiinflammatory drugs, otherwise known as NSAIDs (ibuprofen, naproxen, etc), can also increase blood pressure. Oral contraceptive pills may increase blood pressure in some women. Additionally, any stimulant, including those found in some decongestants, weight loss products, and illicit drugs, can increase blood pressure. If you are regularly consuming any of these substances, you should talk to your health care provider.     Ways to lower blood pressure through diet and exercise:   - Weight Loss: Try to lose weight. For every 2-3 pounds lost, you can decrease blood pressure by 1 mmhg (1 point)  - Healthy diet: Consume a diet rich in fruits, vegetables, whole grains, and low-fat dairy products, with reduced content of saturated and total fat.   Eating an increased amount of fiber may decrease blood pressure. The recommended amount of dietary fiber is 20 to 35 grams of fiber per day. Many breakfast cereals are excellent sources of dietary fiber. Eating more fish may help to lower blood pressure, especially when combined with weight loss. Caffeine may cause a small rise in blood pressure, although this effect is usually temporary. Drinking a moderate amount of caffeine (less than 2 cups of coffee per day) does not increase the risk of high blood pressure in most people.   -  Reduce sodium intake: Reducing the amount of sodium you consume can lower blood pressure if you have high or borderline-high blood pressure.  The main source of sodium in the diet is the salt contained in packaged and processed foods and in foods from restaurants.  The body requires a small amount of sodium in the diet, and most people consume more sodium than they need (over 3 grams per day). A low-sodium diet contains fewer than 2.4 grams (2400 milligrams) of sodium per day. Although the ideal target for daily sodium intake remains controversial, the optimal goal is less than 1500 mg per day. For most adults with hypertension, however, a 1000 mg per day reduction in serum sodium intake (compared with current intake) can help reduce your blood pressure. Although it is difficult initially to cut back on the amount of sodium in the diet, most people find that their taste adjusts quickly to reduced sodium. Salt is an acquired taste, and taste can be retrained in 10 to 14 days if people stick with the lower-sodium diet. Fresh herbs, spice blends without sodium, citrus, and flavored vinegar make tasty alternatives to the salt shaker.   - Increase exercise: Try to get your heart rate up for 30 minutes per day at least 4-5 days per week. Also try to incorporate strength training for 10 minutes daily. Aerobic, dynamic resistance and isometric resistance exercise can decrease systolic and diastolic pressure by, on average, 4 to 6 mmHg and 3 mmHg.  - Drinking an excessive amount of alcohol increases your risk of developing high blood pressure. A  drink  is defined as 5 oz of wine, 12 oz of beer, or 1 oz of hard liquor. People who consume more than two drinks per day have an increased risk of high blood pressure compared with nondrinkers, and binge drinking (consuming four to five drinks within two hours) is an even greater problem for overall health and hypertension. If you drink alcohol, reduce to:   Men: ?2 drinks  "daily.  Women: ?1 drink daily    Avoid these foods Try these foods instead   Cured and smoked foods such as culver, sausage, smoked fish and meats, hot dogs, ham, lunch meats, and corn beef Fresh turkey, chicken, and lean beef   Canned fish (tuna, sardines) Unsalted tuna or sardines   Canned meats Fresh unprocessed meats, vegetable protein, and fish; or frozen and canned meats, vegetable protein, and fish that are labeled \"low sodium\"   Salted pretzels, crackers, potato chips, tortilla chips, and nuts Low-sodium and unsalted versions of these foods   Most cheeses Low-sodium cheeses (check label to confirm actual sodium content)   Sauces (tomato and cream etc), tomato juices Low-sodium versions of these foods, such as low-sodium tomato juice   Processed, instant, and convenience foods such as frozen dinners, packaged meals, canned soups, and boxed pasta blends Cook and freeze your own low-sodium meals, soups, and broths    If you must use convenience or processed foods, read the labels and choose items with 140-200 mg of sodium per serving per food, or    For an entire convenience meal (frozen dinner), try to stay under 500-600 mg sodium.    If you do used canned foods, use \"sodium free\" varieties or rinse the canned food under water. This reduces the sodium content by about 40 percent.   Fast foods and foods prepared at restaurants (unless without cheese, sauces, or added salt) Fresh foods and foods with sauces on the side; request that food be prepared without cheese or added salt     A guide to common nutrient claims and what they mean  Salt/sodium free Less than 5 mg of sodium per serving   Very low sodium 35 mg or less of sodium per serving   Low sodium 140 mg or less of sodium per serving   Reduced sodium At least 25% less sodium than the regular product   Light or lite in sodium At least 50% less sodium than the regular product   No salt added or unsalted No salt is added during processing, but these products " may not be salt/sodium free unless stated      Lung Cancer Screening   Frequently Asked Questions  If you are at high-risk for lung cancer, getting screened with low-dose computed tomography (LDCT) every year can help save your life. This handout offers answers to some of the most common questions about lung cancer screening. If you have other questions, please call [phone] ([phone]).     What is it?  Lung cancer screening uses special X-ray technology to create an image of your lung tissue. The exam is quick and easy and takes less than 10 seconds. We don t give you any medicine or use any needles. You can eat before and after the exam. You don t need to change your clothes as long as the clothing on your chest doesn t contain metal. But, you do need to be able to hold your breath for at least 6 seconds during the exam.    What is the goal of lung cancer screening?  The goal of lung cancer screening is to save lives. Many times, lung cancer is not found until a person starts having physical symptoms. Lung cancer screening can help detect lung cancer in the earliest stages when it may be easier to treat.    Who should be screened for lung cancer?  We suggest lung cancer screening for anyone who is at high-risk for lung cancer. You are in the high-risk group if you:     are between the ages of 55 and 79, and   have smoked at least 1 pack of cigarettes a day for 20 or more years, and   still smoke or have quit within the past 15 years.    However, if you have a new cough or shortness of breath, you should talk to your doctor before being screened.    Why does it matter if I have symptoms?  Certain symptoms can be a sign that you have a condition in your lungs that should be checked and treated by your doctor. These symptoms include fever, chest pain, a new or changing cough, shortness of breath that you have never felt before, coughing up blood or unexplained weight loss. Having any of these symptoms  can greatly affect the results of lung cancer screening.       Should all smokers get an LDCT lung cancer screening exam?  It depends. Lung cancer screening is for a very specific group of men and women who have a history of heavy smoking over a long period of time (see  Who should be screened for lung cancer  above).  I am in the high-risk group, but have been diagnosed with cancer in the past. Is LDCT lung cancer screening right for me?  In some cases, you should not have LDCT lung screening, such as when your doctor is already following your cancer with CT scan studies. Your doctor will help you decide if LDCT lung screening is right for you.  Do I need to have a screening exam every year?  Yes. If you are in the high-risk group described earlier, you should get an LDCT lung cancer screening exam every year until you are 79, or are no longer willing or able to undergo screening and possible procedures to diagnose and treat lung cancer.  How effective is LDCT at preventing death from lung cancer?  Studies have shown that LDCT lung cancer screening can lower the risk of death from lung cancer by 20 percent in people who are at high-risk.  What are the risks?  There are some risks and limitations of LDCT lung cancer screening. We want to make sure you understand the risks and benefits, so please let us know if you have any questions. Your doctor may want to talk with you more about these risks.   Radiation exposure: As with any exam that uses radiation, there is a very small increased risk of cancer. The amount of radiation in LDCT is small--about the same amount a person would get from a mammogram. Your doctor orders the exam when he or she feels the potential benefits outweigh the risks.   False negatives: No test is perfect, including LDCT. It is possible that you may have a medical condition, including lung cancer, that is not found during your exam. This is called a false negative result.   False positives and  more testing: LDCT very often finds something in the lung that could be cancer, but in fact is not. This is called a false positive result. False positive tests often cause anxiety. To make sure these findings are not cancer, you may need to have more tests. These tests will be done only if you give us permission. Sometimes patients need a treatment that can have side effects, such as a biopsy. For more information on false positives, see  What can I expect from the results?    Findings not related to lung cancer: Your LDCT exam also takes pictures of areas of your body next to your lungs. In a very small number of cases, the CT scan will show an abnormal finding in one of these areas, such as your kidneys, adrenal glands, liver or thyroid. This finding may not be serious, but you may need more tests. Your doctor can help you decide what other tests you may need, if any.  What can I expect from the results?  About 1 out of 4 LDCT exams will find something that may need more tests. Most of the time, these findings are lung nodules. Lung nodules are very small collections of tissue in the lung. These nodules are very common, and the vast majority--more than 97 percent--are not cancer (benign). Most are normal lymph nodes or small areas of scarring from past infections.  But, if a small lung nodule is found to be cancer, the cancer can be cured more than 90 percent of the time. To know if the nodule is cancer, we may need to get more images before your next yearly screening exam. If the nodule has suspicious features (for example, it is large, has an odd shape or grows over time), we will refer you to a specialist for further testing.  Will my doctor also get the results?  Yes. Your doctor will get a copy of your results.  Is it okay to keep smoking now that there s a cancer screening exam?  No. Tobacco is one of the strongest cancer-causing agents. It causes not only lung cancer, but other cancers and cardiovascular  (heart) diseases as well. The damage caused by smoking builds over time. This means that the longer you smoke, the higher your risk of disease. While it is never too late to quit, the sooner you quit, the better.  Where can I find help to quit smoking?  The best way to prevent lung cancer is to stop smoking. If you have already quit smoking, congratulations and keep it up! For help on quitting smoking, please call [employer] at 8-818-QUITNOW (1-583.586.5066) or the [employer] at 1-751.672.3805 to find local resources near you.  One-on-one health coaching:  If you d prefer to work individually with a health care provider on tobacco cessation, we offer:     Medication Therapy Management:  Our specially trained pharmacists work closely with you and your doctor to help you quit smoking.  Call [phone] or [phone] (toll free).

## 2023-05-15 NOTE — PROGRESS NOTES
Assessment & Plan     Encounter to establish care  Medical, surgical, family, and social histories discussed updated. Reviewed active healthcare problems. Medications reviewed.     Abdominal cramping  Loose stools  Ongoing 10+ years.  No red flag findings of weight loss or melena/hematochezia.  May be irritable bowel.  No prior colonoscopy.  Plan to start with basic labs and diagnostic colonoscopy.  Further work-up pending results.  - Adult GI  Referral - Procedure Only; Future    Breast cancer screening by mammogram  - MA Screen Bilateral w/Norman; Future    Elevated blood pressure reading  Elevated today x2.  Recommend home monitoring and reviewed parameters.  Handout given for lifestyle changes.  Will reassess at physical.    Screening for diabetes mellitus  - Comprehensive metabolic panel; Future  - Comprehensive metabolic panel    Screening for lipid disorders  - Lipid Profile; Future  - Lipid Profile    Laboratory examination ordered as part of a routine general medical examination  - Comprehensive metabolic panel; Future  - CBC with Platelets & Differential; Future  - Lipid Profile; Future  - Comprehensive metabolic panel  - CBC with Platelets & Differential  - Lipid Profile    Tobacco dependence (50 years, 0.75ppd)  Encouraged cessation.  She is working on cutting back.    S/P total hysterectomy  Noted.  No need for Pap smears.    Family history of thyroid disease  - TSH with free T4 reflex; Future  - TSH with free T4 reflex    Personal history of tobacco use  Discussed risks (radiation exposure, further imaging, testing, or detection of insignificant lesions or false positive findings) and benefits of low-dose CT scanning for lung cancer including detecting small abnormalities and cancer in the lungs. Reviewed that the benefit comes from continuing annual screening over time and follow up on concerning lesions with specialists. Did review that the best way to prevent lung cancer is to not smoke.  Patient was understanding of risks and benefits and agreeable to proceed with screening.   - Prof fee: Shared Decision Making for Lung Cancer Screening  - CT Chest Lung Cancer Scrn Low Dose wo; Future    Need for diphtheria-tetanus-pertussis (Tdap) vaccine  Agreeable to do today - discussed may not be covered by insurance and she was okay with that.   - TDAP VACCINE (Adacel, Boostrix)  [[id number]]    Encounter for immunization  - Pneumococcal 20 Valent Conjugate (PCV20)  - TDAP VACCINE (Adacel, Boostrix)  [[id number]]    Elevated fasting glucose  Will add on A1c.   - Hemoglobin A1c; Future      Nicotine/Tobacco Cessation:  She reports that she has been smoking cigarettes. She started smoking about 50 years ago. She has a 37.50 pack-year smoking history. She has never used smokeless tobacco.  Nicotine/Tobacco Cessation Plan:   Information offered: Patient not interested at this time      Melissa Merritt MD  [facility] - REGAN Estrella is a 67 year old, presenting for the following health issues:  Establish Care        5/15/2023    10:22 AM   Additional Questions   Roomed by Cintia RAMIREZ     [facility] - has been many years since she had primary doctor   Has been taking care of everyone else.     Many years of stomach irritation. Probably 10 years. With greasy foods or coffee, cramping in abdomen then diarrhea. Then feels better. Hasn't changed. No blood. No history of colonoscopy. Only with certain foods this happens. Not constant. Hasn't worsened. No family history of colon cancer. Pain is in lower abdomen, not upper.     Has been many years since mammogram. Reports sore on right breast from bra many years ago. Has been checked with mammogram.     Has BP cuff at home. Hasn't had hypertension in past.     Tobacco use - 50 years, 0.75 ppd  Would want to do lung cancer screening.     Mom and sister both had thyroid disease.         Objective    BP (!) 140/80   Pulse 93   Resp 16   Wt  94.8 kg (209 lb)   SpO2 96%   BMI 29.99 kg/m    Body mass index is 29.99 kg/m .     Physical Exam  Constitutional:       General: She is not in acute distress.     Appearance: Normal appearance.   Cardiovascular:      Rate and Rhythm: Normal rate and regular rhythm.      Heart sounds: No murmur heard.  Pulmonary:      Effort: Pulmonary effort is normal.      Breath sounds: Normal breath sounds. No wheezing, rhonchi or rales.   Neurological:      General: No focal deficit present.      Mental Status: She is alert and oriented to person, place, and time.   Psychiatric:         Mood and Affect: Mood normal.         Behavior: Behavior normal.            Results for orders placed or performed in visit on 05/15/23 (from the past 24 hour(s))   Comprehensive metabolic panel   Result Value Ref Range    Sodium 138 136 - 145 mmol/L    Potassium 4.3 3.4 - 5.3 mmol/L    Chloride 101 98 - 107 mmol/L    Carbon Dioxide (CO2) 23 22 - 29 mmol/L    Anion Gap 14 7 - 15 mmol/L    Urea Nitrogen 17.2 8.0 - 23.0 mg/dL    Creatinine 0.80 0.51 - 0.95 mg/dL    Calcium 9.6 8.8 - 10.2 mg/dL    Glucose 111 (H) 70 - 99 mg/dL    Alkaline Phosphatase 141 (H) 35 - 104 U/L    AST 23 10 - 35 U/L    ALT 29 10 - 35 U/L    Protein Total 7.3 6.4 - 8.3 g/dL    Albumin 4.3 3.5 - 5.2 g/dL    Bilirubin Total 0.3 <=1.2 mg/dL    GFR Estimate 80 >60 mL/min/1.73m2   CBC with Platelets & Differential    Narrative    The following orders were created for panel order CBC with Platelets & Differential.  Procedure                               Abnormality         Status                     ---------                               -----------         ------                     CBC with platelets and d...[[id number]]  Abnormal            Final result                 Please view results for these tests on the individual orders.   Lipid Profile   Result Value Ref Range    Cholesterol 276 (H) <200 mg/dL    Triglycerides 265 (H) <150 mg/dL    Direct Measure HDL 51 >=50  mg/dL    LDL Cholesterol Calculated 172 (H) <=100 mg/dL    Non HDL Cholesterol 225 (H) <130 mg/dL    Narrative    Cholesterol  Desirable:  <200 mg/dL    Triglycerides  Normal:  Less than 150 mg/dL  Borderline High:  150-199 mg/dL  High:  200-499 mg/dL  Very High:  Greater than or equal to 500 mg/dL    Direct Measure HDL  Female:  Greater than or equal to 50 mg/dL   Male:  Greater than or equal to 40 mg/dL    LDL Cholesterol  Desirable:  <100mg/dL  Above Desirable:  100-129 mg/dL   Borderline High:  130-159 mg/dL   High:  160-189 mg/dL   Very High:  >= 190 mg/dL    Non HDL Cholesterol  Desirable:  130 mg/dL  Above Desirable:  130-159 mg/dL  Borderline High:  160-189 mg/dL  High:  190-219 mg/dL  Very High:  Greater than or equal to 220 mg/dL   TSH with free T4 reflex   Result Value Ref Range    TSH 1.87 0.30 - 4.20 uIU/mL   CBC with platelets and differential   Result Value Ref Range    WBC Count 11.3 (H) 4.0 - 11.0 10e3/uL    RBC Count 4.89 3.80 - 5.20 10e6/uL    Hemoglobin 15.2 11.7 - 15.7 g/dL    Hematocrit 45.6 35.0 - 47.0 %    MCV 93 78 - 100 fL    MCH 31.1 26.5 - 33.0 pg    MCHC 33.3 31.5 - 36.5 g/dL    RDW 13.2 10.0 - 15.0 %    Platelet Count 276 150 - 450 10e3/uL    % Neutrophils 72 %    % Lymphocytes 17 %    % Monocytes 8 %    % Eosinophils 2 %    % Basophils 1 %    % Immature Granulocytes 0 %    NRBCs per 100 WBC 0 <1 /100    Absolute Neutrophils 8.2 1.6 - 8.3 10e3/uL    Absolute Lymphocytes 1.9 0.8 - 5.3 10e3/uL    Absolute Monocytes 0.9 0.0 - 1.3 10e3/uL    Absolute Eosinophils 0.2 0.0 - 0.7 10e3/uL    Absolute Basophils 0.1 0.0 - 0.2 10e3/uL    Absolute Immature Granulocytes 0.0 <=0.4 10e3/uL    Absolute NRBCs 0.0 10e3/uL

## 2023-05-16 ENCOUNTER — MYC MEDICAL ADVICE (OUTPATIENT)
Dept: FAMILY MEDICINE | Facility: OTHER | Age: 68
End: 2023-05-16

## 2023-05-16 ENCOUNTER — TELEPHONE (OUTPATIENT)
Dept: SURGERY | Facility: OTHER | Age: 68
End: 2023-05-16

## 2023-05-16 DIAGNOSIS — E78.2 MIXED HYPERLIPIDEMIA: Primary | ICD-10-CM

## 2023-05-16 LAB — GGT SERPL-CCNC: 64 U/L (ref 5–36)

## 2023-05-16 RX ORDER — ROSUVASTATIN CALCIUM 5 MG/1
5 TABLET, COATED ORAL DAILY
Qty: 90 TABLET | Refills: 0 | Status: SHIPPED | OUTPATIENT
Start: 2023-05-16 | End: 2023-08-16

## 2023-05-16 NOTE — TELEPHONE ENCOUNTER
5/16-LM for patient to schedule colonoscopy consult with Dr. William (or her choice)       Hillary Woods

## 2023-05-17 ENCOUNTER — PREP FOR PROCEDURE (OUTPATIENT)
Dept: SURGERY | Facility: OTHER | Age: 68
End: 2023-05-17

## 2023-05-17 ENCOUNTER — OFFICE VISIT (OUTPATIENT)
Dept: SURGERY | Facility: OTHER | Age: 68
End: 2023-05-17
Attending: SURGERY
Payer: COMMERCIAL

## 2023-05-17 VITALS
HEIGHT: 69 IN | BODY MASS INDEX: 30.96 KG/M2 | SYSTOLIC BLOOD PRESSURE: 140 MMHG | DIASTOLIC BLOOD PRESSURE: 80 MMHG | HEART RATE: 109 BPM | WEIGHT: 209 LBS | TEMPERATURE: 98.3 F | OXYGEN SATURATION: 95 %

## 2023-05-17 DIAGNOSIS — R10.84 ABDOMINAL PAIN, GENERALIZED: ICD-10-CM

## 2023-05-17 DIAGNOSIS — R19.8 ALTERED BOWEL FUNCTION: Primary | ICD-10-CM

## 2023-05-17 DIAGNOSIS — R19.7 DIARRHEA: Primary | ICD-10-CM

## 2023-05-17 PROCEDURE — G0463 HOSPITAL OUTPT CLINIC VISIT: HCPCS

## 2023-05-17 PROCEDURE — G0463 HOSPITAL OUTPT CLINIC VISIT: HCPCS | Mod: 25

## 2023-05-17 PROCEDURE — 99203 OFFICE O/P NEW LOW 30 MIN: CPT | Performed by: SURGERY

## 2023-05-17 RX ORDER — BISACODYL 5 MG/1
TABLET, DELAYED RELEASE ORAL
Qty: 4 TABLET | Refills: 0 | Status: SHIPPED | OUTPATIENT
Start: 2023-05-17 | End: 2023-07-11

## 2023-05-17 RX ORDER — POLYETHYLENE GLYCOL 3350, SODIUM SULFATE ANHYDROUS, SODIUM BICARBONATE, SODIUM CHLORIDE, POTASSIUM CHLORIDE 236; 22.74; 6.74; 5.86; 2.97 G/4L; G/4L; G/4L; G/4L; G/4L
4000 POWDER, FOR SOLUTION ORAL SEE ADMIN INSTRUCTIONS
Qty: 4000 ML | Refills: 0 | Status: SHIPPED | OUTPATIENT
Start: 2023-05-17 | End: 2023-07-11

## 2023-05-17 ASSESSMENT — PAIN SCALES - GENERAL: PAINLEVEL: NO PAIN (0)

## 2023-05-17 NOTE — PROGRESS NOTES
CLINIC NOTE - CONSULT  5/17/2023    Patient:Delores Mac  Referring Physician: No ref. provider found    Reason for Referral: Change in bowel function and abdominal pain    This is a 67 year old female with a need of a colonoscopy for Change in bowel function and abdominal pain.     Personal history of colon cancer : NO   Family history of colon cancer : NO   Personal history of polyps : NO   Family history of polyps : NO   History of Inflammatory Bowel Disease : NO   History of rectal bleeding : NO   Changes in bowel habits : YES   Last colonoscopy : years ago    Past Medical History:  Past Medical History:   Diagnosis Date     Closed 3-part fracture of proximal end of left humerus with malunion 10/5/2018     Shoulder stiffness, left 12/20/2018       Past Surgical History:  Past Surgical History:   Procedure Laterality Date     BREAST LUMPECTOMY, RT/LT Left 05/04/1999    Biopsy results Bartholin cyst.      HYSTERECTOMY TOTAL ABDOMINAL, BILATERAL SALPINGO-OOPHORECTOMY, COMBINED Bilateral 05/04/1999    Performed at [facility] by Dr. LAURA Michael for fibroid uterus.      TOE SURGERY Bilateral     end of toes cut off from bone infection. had chronic ingrown nails.       Family History History:  Family History   Problem Relation Age of Onset     Breast Cancer Mother      Thyroid Disease Mother      Lung Cancer Father         mesothelioma     Hashimoto's thyroiditis Sister      Heart Disease Brother 72     Aneurysm Maternal Grandmother      Bone Cancer Maternal Grandfather        History of Tobacco Use:  History   Smoking Status     Every Day     Packs/day: 0.75     Years: 50.00     Types: Cigarettes     Start date: 1973   Smokeless Tobacco     Never       Current Medications:  Current Outpatient Medications   Medication Sig Dispense Refill     bisacodyl (DULCOLAX) 5 MG EC tablet 2 Dulcolax tabs po hs 2 nights before surg, 2 dulcolax tabs po 3pm day before surg, 4 tablet 0     Calcium Carbonate  "(CALCIUM 600 PO) Take 1 tablet by mouth daily       Cyanocobalamin 5000 MCG SUBL Place 1 tablet under the tongue daily       diphenhydrAMINE-APAP, sleep, (TYLENOL PM EXTRA STRENGTH PO) Take 2 tablets by mouth daily       glucosamine-chondroitinoitin 750-600 MG TABS Take 1 tablet by mouth 2 times daily       Multiple Vitamins-Minerals (PRESERVISION AREDS PO) Take 1 tablet by mouth 2 times daily       polyethylene glycol (GOLYTELY) 236 g suspension Take 4,000 mLs by mouth See Admin Instructions 2 Dulcolax tabs po hs 2 nights before surg, 2 dulcolax tabs po 3pm day before surg, 6pm day before surg 8 oz golytely every 10 min until jug is 1/2 gone refrigerate, 6 hrs before arrival golytely 8 oz q 10 min 4000 mL 0     rosuvastatin (CRESTOR) 5 MG tablet Take 1 tablet (5 mg) by mouth daily 90 tablet 0     Vitamin D3 (CHOLECALCIFEROL) 125 MCG (5000 UT) tablet Take 1 tablet by mouth 2 times daily         Allergies:  No Known Allergies    ROS:  Pertinent items are noted in HPI.  All other systems are negative.    PHYSICAL EXAM:     Vital signs: BP (!) 140/80 (BP Location: Right arm, Patient Position: Chair, Cuff Size: Adult Regular)   Pulse 109   Temp 98.3  F (36.8  C) (Tympanic)   Ht 1.753 m (5' 9\")   Wt 94.8 kg (209 lb)   SpO2 95%   BMI 30.86 kg/m     Weight: [unfilled]   BMI: Body mass index is 30.86 kg/m .   General: Normal, healthy, cooperative, in no acute distress, alert   Skin: no jaundice   HEENT: PERRLA and EOMI   Neck: supple       ASSESSMENT:      67 year old female with a need of a colonoscopy for Change in bowel function abdominal pain.    PLAN:   A colonoscopy will be scheduled.  The procedure with their risks, benefits and alternatives were explained.  Risks include but are not limited to bleeding, perforation, missing lesions, need for additional procedures, reaction to anesthesia.  All the patients questions were answered.  The patient consents to proceed as planned.    We will also get a CT of the abdomen " pelvis to rule out other causes for her abdominal pain.

## 2023-05-17 NOTE — PATIENT INSTRUCTIONS
Thank you for allowing Dr. Corona and our surgical team to participate in your care. Please call our health unit coordinator at [phone] with scheduling questions or the nurse at [phone] with any other questions or concerns.    You have been scheduled for: colonoscopy with  on Thursday June 22nd.   You will use [employer] bowel prep.  Please see handout for additional instruction.  You WILL need a pre-operative appointment with your primary care provider.  You may call [phone] or [phone] with any questions.

## 2023-06-03 ENCOUNTER — HEALTH MAINTENANCE LETTER (OUTPATIENT)
Age: 68
End: 2023-06-03

## 2023-06-14 NOTE — PROGRESS NOTES
[facility] [place]  [address]  [place] [address]  Phone: [phone]  Primary Provider: Melissa Burr  Pre-op Performing Provider: MELISSA BURR    PREOPERATIVE EVALUATION:  Today's date: 6/15/2023    Delores Mac is a 67 year old female who presents for a preoperative evaluation.    Surgical Information:  Surgery/Procedure: Colonoscopy  [place] [facility] [place]geon: Dr. Corona  Surgery Date: 6/22/23  Time of Surgery: TBD  Where patient plans to recover: At home with family  Fax number for surgical facility: Note does not need to be faxed, will be available electronically in Epic.    Assessment & Plan     The proposed surgical procedure is considered LOW risk.    Preop general physical exam  Optimized for procedure.   - Hemoglobin; Future  - EKG 12-lead complete w/read - (Clinic Performed)  - Partial thromboplastin time; Future  - INR; Future  - Hemoglobin  - UA with Microscopic; Future    Abdominal cramping  Loose stools  Reason for colonoscopy.     Tobacco dependence (50 years, 0.75ppd)  37 pack year history. Working on cessation.     Mixed hyperlipidemia  Stable. Started on Crestor 1 month ago and is tolerating well. Lipids checked 5/2023.     Elevated alkaline phosphatase level  With elevated GGT. No abdominal pain.  Planning right upper quadrant ultrasound.    Family history of bleeding disorder  Notes a family history of bleeding disorders and does not think she has ever had her coagulation tests checked.  She does not have any significant history of bleeding, however and has had surgeries in the past.  Did discuss that for a family history of bleeding disorders, Medicare does not cover an INR or PTT, however she did sign the waiver, and was agreeable to proceed with obtaining these blood tests.  - Partial thromboplastin time; Future  - INR; Future    Mixed incontinence  Long history (many years) of mixed incontinence.  No dysuria, flank pain, or blood in her urine.  She would  "like to check a UA today. Will discuss further workup and treatment options at follow up appointment.   - UA with Microscopic reflex to Culture - [place]; Future  - UA with Microscopic; Future      Possible Sleep Apnea: STOP-BANG 2-3.  Borderline risk.        6/15/2023    10:26 AM   STOP-Bang Total Score   Total Score 3   Risk Stratification 3 - 4: Moderate Risk for MARYSE           - No identified additional risk factors other than previously addressed    Antiplatelet or Anticoagulation Medication Instructions:   - Patient is on no antiplatelet or anticoagulation medications.    Additional Medication Instructions:  Patient is to take all scheduled medications on the day of surgery    RECOMMENDATION:  APPROVAL GIVEN to proceed with proposed procedure, without further diagnostic evaluation.    ADDENDUM 6/16/2023 12:07 PM: patient found to have uti 6/16. No systemic symptoms. Treating with macrobid. Will check with anesthesia if issues with upcoming colonoscopy.     Subjective       HPI related to upcoming procedure: Patient has a long history of abdominal cramping, loose stools, and change in bowel habits.  She has never had a colonoscopy, so colonoscopy procedure is planned.  In addition, CT abdomen pelvis has been ordered, in addition to an ultrasound of her gallbladder for an elevated alk phos with elevated GGT.    Has been feeling well with no fevers, congestion, cough, or other URI symptoms.   No ischemic cardiac history. Denies any shortness of breath, chest pain, or dyspnea on exertion.   Is able to function at >4 METS.   Has had prior surgeries with general anesthesia and did well.     Answers for HPI/ROS submitted by the patient on 6/15/2023  In general, how would you rate your overall physical health?: fair  Do you usually eat at least 4 servings of fruit and vegetables a day, include whole grains & fiber, and avoid regularly eating high fat or \"junk\" foods? : No  Taking medications regularly:: Yes  Medication " side effects:: Not applicable  Activities of Daily Living: no assistance needed  Home safety: no safety concerns identified  Hearing Impairment:: no hearing concerns  In the past 6 months, have you been bothered by leaking of urine?: Yes  In general, how would you rate your overall mental or emotional health?: fair    Additional concerns today:: No  Duration of exercise:: Less than 15 minutes        6/15/2023     9:29 AM   Preop Questions   1. Have you ever had a heart attack or stroke? No   2. Have you ever had surgery on your heart or blood vessels, such as a stent placement, a coronary artery bypass, or surgery on an artery in your head, neck, heart, or legs? No   3. Do you have chest pain with activity? No   4. Do you have a history of  heart failure? No   5. Do you currently have a cold, bronchitis or symptoms of other infection? No   6. Do you have a cough, shortness of breath, or wheezing? No   7. Do you or anyone in your family have previous history of blood clots? No   8. Do you or does anyone in your family have a serious bleeding problem such as prolonged bleeding following surgeries or cuts? YES - mom was a bleeder, grandson may have factor IX; she has never had bleeding trouble    9. Have you ever had problems with anemia or been told to take iron pills? No   10. Have you had any abnormal blood loss such as black, tarry or bloody stools, or abnormal vaginal bleeding? No   11. Have you ever had a blood transfusion? UNKNOWN    12. Are you willing to have a blood transfusion if it is medically needed before, during, or after your surgery? Yes   13. Have you or any of your relatives ever had problems with anesthesia? No   14. Do you have sleep apnea, excessive snoring or daytime drowsiness? YES - never tested   14a. Do you have a CPAP machine? No   15. Do you have any artifical heart valves or other implanted medical devices like a pacemaker, defibrillator, or continuous glucose monitor? No   16. Do you  have artificial joints? No   17. Are you allergic to latex? No     Health Care Directive:  Patient does not have a Health Care Directive or Living Will: Discussed advance care planning with patient; information given to patient to review.    Preoperative Review of :   reviewed - no record of controlled substances prescribed.    Status of Chronic Conditions:  HYPERLIPIDEMIA - Patient has a long history of significant Hyperlipidemia requiring medication for treatment with recent good control. Patient reports no problems or side effects with the medication. Crestor was just started one month ago.     The 10-year ASCVD risk score (Theresa RANGEL, et al., 2019) is: 17%    Values used to calculate the score:      Age: 67 years      Sex: Female      Is Non- : No      Diabetic: No      Tobacco smoker: Yes      Systolic Blood Pressure: 138 mmHg      Is BP treated: No      HDL Cholesterol: 51 mg/dL      Total Cholesterol: 276 mg/dL    Years of incontinence. Will occur with stress, coughing and have urgency. Would like UA prior to surgery. Urine is maybe darker. No dysuria or hematuria.     Review of Systems  CONSTITUTIONAL: NEGATIVE for fever, chills, change in weight  ENT/MOUTH: NEGATIVE for ear, mouth and throat problems  RESP: NEGATIVE for significant cough or SOB  CV: NEGATIVE for chest pain, palpitations or peripheral edema    Patient Active Problem List    Diagnosis Date Noted     Mixed hyperlipidemia 06/15/2023     Priority: Medium     Tobacco dependence (50 years, 0.75ppd) 05/15/2023     Priority: Medium     Abdominal cramping 05/15/2023     Priority: Medium     Loose stools 05/15/2023     Priority: Medium     Elevated blood pressure reading 05/15/2023     Priority: Medium     S/P total hysterectomy 05/15/2023     Priority: Low      Past Medical History:   Diagnosis Date     Closed 3-part fracture of proximal end of left humerus with malunion 10/5/2018     Shoulder stiffness, left 12/20/2018      Past Surgical History:   Procedure Laterality Date     BREAST LUMPECTOMY, RT/LT Left 05/04/1999    Biopsy results Bartholin cyst.      HYSTERECTOMY TOTAL ABDOMINAL, BILATERAL SALPINGO-OOPHORECTOMY, COMBINED Bilateral 05/04/1999    Performed at [facility] by Dr. LAURA Michael for fibroid uterus.      TOE SURGERY Bilateral     end of toes cut off from bone infection. had chronic ingrown nails.     Current Outpatient Medications   Medication Sig Dispense Refill     Calcium Carbonate (CALCIUM 600 PO) Take 1 tablet by mouth daily       Cyanocobalamin 5000 MCG SUBL Place 1 tablet under the tongue daily       diphenhydrAMINE-APAP, sleep, (TYLENOL PM EXTRA STRENGTH PO) Take 2 tablets by mouth daily       glucosamine-chondroitinoitin 750-600 MG TABS Take 1 tablet by mouth 2 times daily       Multiple Vitamins-Minerals (PRESERVISION AREDS PO) Take 1 tablet by mouth 2 times daily       rosuvastatin (CRESTOR) 5 MG tablet Take 1 tablet (5 mg) by mouth daily 90 tablet 0     Vitamin D3 (CHOLECALCIFEROL) 125 MCG (5000 UT) tablet Take 1 tablet by mouth 2 times daily       bisacodyl (DULCOLAX) 5 MG EC tablet 2 Dulcolax tabs po hs 2 nights before surg, 2 dulcolax tabs po 3pm day before surg, (Patient not taking: Reported on 6/15/2023) 4 tablet 0     polyethylene glycol (GOLYTELY) 236 g suspension Take 4,000 mLs by mouth See Admin Instructions 2 Dulcolax tabs po hs 2 nights before surg, 2 dulcolax tabs po 3pm day before surg, 6pm day before surg 8 oz golytely every 10 min until jug is 1/2 gone refrigerate, 6 hrs before arrival golytely 8 oz q 10 min (Patient not taking: Reported on 6/15/2023) 4000 mL 0       No Known Allergies     Social History     Tobacco Use     Smoking status: Every Day     Packs/day: 0.75     Years: 50.00     Pack years: 37.50     Types: Cigarettes     Start date: 1973     Smokeless tobacco: Never   Vaping Use     Vaping status: Never Used   Substance Use Topics     Alcohol use: No  "    Family History   Problem Relation Age of Onset     Breast Cancer Mother      Thyroid Disease Mother      Lung Cancer Father         mesothelioma     Hashimoto's thyroiditis Sister      Heart Disease Brother 72     Aneurysm Maternal Grandmother      Bone Cancer Maternal Grandfather      History   Drug Use No         Objective     /84   Pulse 78   Temp 97.8  F (36.6  C) (Tympanic)   Ht 1.753 m (5' 9\")   Wt 94.3 kg (208 lb)   SpO2 97%   BMI 30.72 kg/m      Physical Exam    GENERAL APPEARANCE: healthy, alert and no distress     EYES: EOMI, PERRL     HENT: ear canals and TM's normal and nose and mouth without ulcers or lesions     NECK: no adenopathy, no asymmetry, masses, or scars and thyroid normal to palpation     RESP: lungs clear to auscultation - no rales, rhonchi or wheezes     CV: regular rates and rhythm, normal S1 S2, no S3 or S4 and no murmur, click or rub; no peripheral edema     ABDOMEN:  soft, nontender, no HSM or masses and bowel sounds normal; no CVA tenderness     MS: extremities normal- no gross deformities noted, no evidence of inflammation in joints, FROM in all extremities.     SKIN: no suspicious lesions or rashes     NEURO: Normal strength and tone, sensory exam grossly normal, mentation intact and speech normal     PSYCH: mentation appears normal. and affect normal/bright     LYMPHATICS: No cervical adenopathy    Recent Labs   Lab Test 05/15/23  1107   HGB 15.2         POTASSIUM 4.3   CR 0.80   A1C 5.4        Diagnostics:  Labs pending at this time.  Results will be reviewed when available.   EKG: appears normal, NSR, normal axis, normal intervals, no acute ST/T changes c/w ischemia, no LVH by voltage criteria, there are no prior tracings available    Revised Cardiac Risk Index (RCRI):  The patient has the following serious cardiovascular risks for perioperative complications:   - No serious cardiac risks = 0 points     RCRI Interpretation: 0 points: Class I (very low " risk - 0.4% complication rate)           Signed Electronically by: Melissa Merritt MD  Copy of this evaluation report is provided to requesting physician.

## 2023-06-14 NOTE — H&P (VIEW-ONLY)
[facility] [place]  [address]  \[facility]\""[place] [address]  Phone: [phone]  Primary Provider: Melissa Burr  Pre-op Performing Provider: MELISSA BURR    PREOPERATIVE EVALUATION:  Today's date: 6/15/2023    Delores Mac is a 67 year old female who presents for a preoperative evaluation.    Surgical Information:  Surgery/Procedure: Colonoscopy  [place] [facility] [place]: Dr. Corona  Surgery Date: 6/22/23  Time of Surgery: TBD  Where patient plans to recover: At home with family  Fax number for surgical facility: Note does not need to be faxed, will be available electronically in Epic.    Assessment & Plan     The proposed surgical procedure is considered LOW risk.    Preop general physical exam  Optimized for procedure.   - Hemoglobin; Future  - EKG 12-lead complete w/read - (Clinic Performed)  - Partial thromboplastin time; Future  - INR; Future  - Hemoglobin  - UA with Microscopic; Future    Abdominal cramping  Loose stools  Reason for colonoscopy.     Tobacco dependence (50 years, 0.75ppd)  37 pack year history. Working on cessation.     Mixed hyperlipidemia  Stable. Started on Crestor 1 month ago and is tolerating well. Lipids checked 5/2023.     Elevated alkaline phosphatase level  With elevated GGT. No abdominal pain.  Planning right upper quadrant ultrasound.    Family history of bleeding disorder  Notes a family history of bleeding disorders and does not think she has ever had her coagulation tests checked.  She does not have any significant history of bleeding, however and has had surgeries in the past.  Did discuss that for a family history of bleeding disorders, Medicare does not cover an INR or PTT, however she did sign the waiver, and was agreeable to proceed with obtaining these blood tests.  - Partial thromboplastin time; Future  - INR; Future    Mixed incontinence  Long history (many years) of mixed incontinence.  No dysuria, flank pain, or blood in her urine.  She would  "like to check a UA today. Will discuss further workup and treatment options at follow up appointment.   - UA with Microscopic reflex to Culture - [place]; Future  - UA with Microscopic; Future      Possible Sleep Apnea: STOP-BANG 2-3.  Borderline risk.        6/15/2023    10:26 AM   STOP-Bang Total Score   Total Score 3   Risk Stratification 3 - 4: Moderate Risk for MARYSE           - No identified additional risk factors other than previously addressed    Antiplatelet or Anticoagulation Medication Instructions:   - Patient is on no antiplatelet or anticoagulation medications.    Additional Medication Instructions:  Patient is to take all scheduled medications on the day of surgery    RECOMMENDATION:  APPROVAL GIVEN to proceed with proposed procedure, without further diagnostic evaluation.    ADDENDUM 6/16/2023 12:07 PM: patient found to have uti 6/16. No systemic symptoms. Treating with macrobid. Will check with anesthesia if issues with upcoming colonoscopy.     Subjective       HPI related to upcoming procedure: Patient has a long history of abdominal cramping, loose stools, and change in bowel habits.  She has never had a colonoscopy, so colonoscopy procedure is planned.  In addition, CT abdomen pelvis has been ordered, in addition to an ultrasound of her gallbladder for an elevated alk phos with elevated GGT.    Has been feeling well with no fevers, congestion, cough, or other URI symptoms.   No ischemic cardiac history. Denies any shortness of breath, chest pain, or dyspnea on exertion.   Is able to function at >4 METS.   Has had prior surgeries with general anesthesia and did well.     Answers for HPI/ROS submitted by the patient on 6/15/2023  In general, how would you rate your overall physical health?: fair  Do you usually eat at least 4 servings of fruit and vegetables a day, include whole grains & fiber, and avoid regularly eating high fat or \"junk\" foods? : No  Taking medications regularly:: Yes  Medication " side effects:: Not applicable  Activities of Daily Living: no assistance needed  Home safety: no safety concerns identified  Hearing Impairment:: no hearing concerns  In the past 6 months, have you been bothered by leaking of urine?: Yes  In general, how would you rate your overall mental or emotional health?: fair    Additional concerns today:: No  Duration of exercise:: Less than 15 minutes        6/15/2023     9:29 AM   Preop Questions   1. Have you ever had a heart attack or stroke? No   2. Have you ever had surgery on your heart or blood vessels, such as a stent placement, a coronary artery bypass, or surgery on an artery in your head, neck, heart, or legs? No   3. Do you have chest pain with activity? No   4. Do you have a history of  heart failure? No   5. Do you currently have a cold, bronchitis or symptoms of other infection? No   6. Do you have a cough, shortness of breath, or wheezing? No   7. Do you or anyone in your family have previous history of blood clots? No   8. Do you or does anyone in your family have a serious bleeding problem such as prolonged bleeding following surgeries or cuts? YES - mom was a bleeder, grandson may have factor IX; she has never had bleeding trouble    9. Have you ever had problems with anemia or been told to take iron pills? No   10. Have you had any abnormal blood loss such as black, tarry or bloody stools, or abnormal vaginal bleeding? No   11. Have you ever had a blood transfusion? UNKNOWN    12. Are you willing to have a blood transfusion if it is medically needed before, during, or after your surgery? Yes   13. Have you or any of your relatives ever had problems with anesthesia? No   14. Do you have sleep apnea, excessive snoring or daytime drowsiness? YES - never tested   14a. Do you have a CPAP machine? No   15. Do you have any artifical heart valves or other implanted medical devices like a pacemaker, defibrillator, or continuous glucose monitor? No   16. Do you  have artificial joints? No   17. Are you allergic to latex? No     Health Care Directive:  Patient does not have a Health Care Directive or Living Will: Discussed advance care planning with patient; information given to patient to review.    Preoperative Review of :   reviewed - no record of controlled substances prescribed.    Status of Chronic Conditions:  HYPERLIPIDEMIA - Patient has a long history of significant Hyperlipidemia requiring medication for treatment with recent good control. Patient reports no problems or side effects with the medication. Crestor was just started one month ago.     The 10-year ASCVD risk score (Theresa RANGEL, et al., 2019) is: 17%    Values used to calculate the score:      Age: 67 years      Sex: Female      Is Non- : No      Diabetic: No      Tobacco smoker: Yes      Systolic Blood Pressure: 138 mmHg      Is BP treated: No      HDL Cholesterol: 51 mg/dL      Total Cholesterol: 276 mg/dL    Years of incontinence. Will occur with stress, coughing and have urgency. Would like UA prior to surgery. Urine is maybe darker. No dysuria or hematuria.     Review of Systems  CONSTITUTIONAL: NEGATIVE for fever, chills, change in weight  ENT/MOUTH: NEGATIVE for ear, mouth and throat problems  RESP: NEGATIVE for significant cough or SOB  CV: NEGATIVE for chest pain, palpitations or peripheral edema    Patient Active Problem List    Diagnosis Date Noted     Mixed hyperlipidemia 06/15/2023     Priority: Medium     Tobacco dependence (50 years, 0.75ppd) 05/15/2023     Priority: Medium     Abdominal cramping 05/15/2023     Priority: Medium     Loose stools 05/15/2023     Priority: Medium     Elevated blood pressure reading 05/15/2023     Priority: Medium     S/P total hysterectomy 05/15/2023     Priority: Low      Past Medical History:   Diagnosis Date     Closed 3-part fracture of proximal end of left humerus with malunion 10/5/2018     Shoulder stiffness, left 12/20/2018      Past Surgical History:   Procedure Laterality Date     BREAST LUMPECTOMY, RT/LT Left 05/04/1999    Biopsy results Bartholin cyst.      HYSTERECTOMY TOTAL ABDOMINAL, BILATERAL SALPINGO-OOPHORECTOMY, COMBINED Bilateral 05/04/1999    Performed at [facility] by Dr. LAURA Michael for fibroid uterus.      TOE SURGERY Bilateral     end of toes cut off from bone infection. had chronic ingrown nails.     Current Outpatient Medications   Medication Sig Dispense Refill     Calcium Carbonate (CALCIUM 600 PO) Take 1 tablet by mouth daily       Cyanocobalamin 5000 MCG SUBL Place 1 tablet under the tongue daily       diphenhydrAMINE-APAP, sleep, (TYLENOL PM EXTRA STRENGTH PO) Take 2 tablets by mouth daily       glucosamine-chondroitinoitin 750-600 MG TABS Take 1 tablet by mouth 2 times daily       Multiple Vitamins-Minerals (PRESERVISION AREDS PO) Take 1 tablet by mouth 2 times daily       rosuvastatin (CRESTOR) 5 MG tablet Take 1 tablet (5 mg) by mouth daily 90 tablet 0     Vitamin D3 (CHOLECALCIFEROL) 125 MCG (5000 UT) tablet Take 1 tablet by mouth 2 times daily       bisacodyl (DULCOLAX) 5 MG EC tablet 2 Dulcolax tabs po hs 2 nights before surg, 2 dulcolax tabs po 3pm day before surg, (Patient not taking: Reported on 6/15/2023) 4 tablet 0     polyethylene glycol (GOLYTELY) 236 g suspension Take 4,000 mLs by mouth See Admin Instructions 2 Dulcolax tabs po hs 2 nights before surg, 2 dulcolax tabs po 3pm day before surg, 6pm day before surg 8 oz golytely every 10 min until jug is 1/2 gone refrigerate, 6 hrs before arrival golytely 8 oz q 10 min (Patient not taking: Reported on 6/15/2023) 4000 mL 0       No Known Allergies     Social History     Tobacco Use     Smoking status: Every Day     Packs/day: 0.75     Years: 50.00     Pack years: 37.50     Types: Cigarettes     Start date: 1973     Smokeless tobacco: Never   Vaping Use     Vaping status: Never Used   Substance Use Topics     Alcohol use: No  "    Family History   Problem Relation Age of Onset     Breast Cancer Mother      Thyroid Disease Mother      Lung Cancer Father         mesothelioma     Hashimoto's thyroiditis Sister      Heart Disease Brother 72     Aneurysm Maternal Grandmother      Bone Cancer Maternal Grandfather      History   Drug Use No         Objective     /84   Pulse 78   Temp 97.8  F (36.6  C) (Tympanic)   Ht 1.753 m (5' 9\")   Wt 94.3 kg (208 lb)   SpO2 97%   BMI 30.72 kg/m      Physical Exam    GENERAL APPEARANCE: healthy, alert and no distress     EYES: EOMI, PERRL     HENT: ear canals and TM's normal and nose and mouth without ulcers or lesions     NECK: no adenopathy, no asymmetry, masses, or scars and thyroid normal to palpation     RESP: lungs clear to auscultation - no rales, rhonchi or wheezes     CV: regular rates and rhythm, normal S1 S2, no S3 or S4 and no murmur, click or rub; no peripheral edema     ABDOMEN:  soft, nontender, no HSM or masses and bowel sounds normal; no CVA tenderness     MS: extremities normal- no gross deformities noted, no evidence of inflammation in joints, FROM in all extremities.     SKIN: no suspicious lesions or rashes     NEURO: Normal strength and tone, sensory exam grossly normal, mentation intact and speech normal     PSYCH: mentation appears normal. and affect normal/bright     LYMPHATICS: No cervical adenopathy    Recent Labs   Lab Test 05/15/23  1107   HGB 15.2         POTASSIUM 4.3   CR 0.80   A1C 5.4        Diagnostics:  Labs pending at this time.  Results will be reviewed when available.   EKG: appears normal, NSR, normal axis, normal intervals, no acute ST/T changes c/w ischemia, no LVH by voltage criteria, there are no prior tracings available    Revised Cardiac Risk Index (RCRI):  The patient has the following serious cardiovascular risks for perioperative complications:   - No serious cardiac risks = 0 points     RCRI Interpretation: 0 points: Class I (very low " risk - 0.4% complication rate)           Signed Electronically by: Melissa Merritt MD  Copy of this evaluation report is provided to requesting physician.

## 2023-06-15 ENCOUNTER — OFFICE VISIT (OUTPATIENT)
Dept: FAMILY MEDICINE | Facility: OTHER | Age: 68
End: 2023-06-15
Attending: STUDENT IN AN ORGANIZED HEALTH CARE EDUCATION/TRAINING PROGRAM
Payer: COMMERCIAL

## 2023-06-15 VITALS
SYSTOLIC BLOOD PRESSURE: 138 MMHG | HEART RATE: 78 BPM | OXYGEN SATURATION: 97 % | WEIGHT: 208 LBS | HEIGHT: 69 IN | DIASTOLIC BLOOD PRESSURE: 84 MMHG | BODY MASS INDEX: 30.81 KG/M2 | TEMPERATURE: 97.8 F

## 2023-06-15 DIAGNOSIS — F17.200 TOBACCO DEPENDENCE: Chronic | ICD-10-CM

## 2023-06-15 DIAGNOSIS — R10.9 ABDOMINAL CRAMPING: ICD-10-CM

## 2023-06-15 DIAGNOSIS — Z01.818 PREOP GENERAL PHYSICAL EXAM: Primary | ICD-10-CM

## 2023-06-15 DIAGNOSIS — E78.2 MIXED HYPERLIPIDEMIA: ICD-10-CM

## 2023-06-15 DIAGNOSIS — R19.5 LOOSE STOOLS: ICD-10-CM

## 2023-06-15 DIAGNOSIS — N39.46 MIXED INCONTINENCE: ICD-10-CM

## 2023-06-15 DIAGNOSIS — Z83.2 FAMILY HISTORY OF BLEEDING DISORDER: ICD-10-CM

## 2023-06-15 DIAGNOSIS — N30.00 ACUTE CYSTITIS WITHOUT HEMATURIA: ICD-10-CM

## 2023-06-15 DIAGNOSIS — R74.8 ELEVATED ALKALINE PHOSPHATASE LEVEL: ICD-10-CM

## 2023-06-15 LAB
APTT PPP: 29 SECONDS (ref 22–38)
HGB BLD-MCNC: 14.6 G/DL (ref 11.7–15.7)
INR PPP: 0.94 (ref 0.85–1.15)

## 2023-06-15 PROCEDURE — 85610 PROTHROMBIN TIME: CPT | Mod: GA,ZL | Performed by: STUDENT IN AN ORGANIZED HEALTH CARE EDUCATION/TRAINING PROGRAM

## 2023-06-15 PROCEDURE — 93010 ELECTROCARDIOGRAM REPORT: CPT | Mod: 76 | Performed by: INTERNAL MEDICINE

## 2023-06-15 PROCEDURE — 85018 HEMOGLOBIN: CPT | Mod: ZL | Performed by: STUDENT IN AN ORGANIZED HEALTH CARE EDUCATION/TRAINING PROGRAM

## 2023-06-15 PROCEDURE — 99214 OFFICE O/P EST MOD 30 MIN: CPT | Performed by: STUDENT IN AN ORGANIZED HEALTH CARE EDUCATION/TRAINING PROGRAM

## 2023-06-15 PROCEDURE — 85730 THROMBOPLASTIN TIME PARTIAL: CPT | Mod: GA,ZL | Performed by: STUDENT IN AN ORGANIZED HEALTH CARE EDUCATION/TRAINING PROGRAM

## 2023-06-15 PROCEDURE — 93005 ELECTROCARDIOGRAM TRACING: CPT

## 2023-06-15 PROCEDURE — 36415 COLL VENOUS BLD VENIPUNCTURE: CPT | Mod: ZL | Performed by: STUDENT IN AN ORGANIZED HEALTH CARE EDUCATION/TRAINING PROGRAM

## 2023-06-15 PROCEDURE — G0463 HOSPITAL OUTPT CLINIC VISIT: HCPCS | Mod: 25

## 2023-06-15 ASSESSMENT — ACTIVITIES OF DAILY LIVING (ADL): CURRENT_FUNCTION: NO ASSISTANCE NEEDED

## 2023-06-15 ASSESSMENT — ENCOUNTER SYMPTOMS
BREAST MASS: 0
HEMATURIA: 0
MYALGIAS: 0
NAUSEA: 0
JOINT SWELLING: 0
HEADACHES: 0
ABDOMINAL PAIN: 0
PALPITATIONS: 0
CHILLS: 0
NERVOUS/ANXIOUS: 0
CONSTIPATION: 0
HEMATOCHEZIA: 0
HEARTBURN: 0
DIZZINESS: 0
EYE PAIN: 0
FREQUENCY: 1
DIARRHEA: 1
SHORTNESS OF BREATH: 0
COUGH: 0
PARESTHESIAS: 0
ARTHRALGIAS: 0
SORE THROAT: 0
DYSURIA: 0
WEAKNESS: 0
FEVER: 0

## 2023-06-15 ASSESSMENT — PAIN SCALES - GENERAL: PAINLEVEL: NO PAIN (0)

## 2023-06-15 NOTE — PATIENT INSTRUCTIONS
Before Your Surgery     Call your surgeon if there is any change in your health. This includes signs of a cold or flu (such as a sore throat, runny nose, cough, rash, fever, urinary symptoms).   If you take prescribed drugs: Follow your doctor s orders about which medicines to take and which to stop until after surgery.  HOLD supplements (calcium, multivitamin, cyanocobalamin, vitamin D) one week prior to surgery.   Okay to continue Crestor day of surgery   Do not smoke, drink alcohol or take over the counter medicine (unless your surgeon or primary care doctor tells you to) for the 24 hours before and after surgery. Smoking can increase your risk of an infection. Nicotine patches are safe to use. NSAIDS like ibuprofen (Advil, Motrin) should be held one day before surgery. Celebrex should be held 3 days before surgery. Naproxen (Aleve) should be held four days before surgery.   Do not take supplements/vitamins day prior and morning of surgery. Some supplements/vitamins can interfere with anesthesia.   Eating and drinking prior to surgery: follow the instructions from your surgeon  Take a shower or bath the night before surgery and morning of surgery. Use the soap your surgeon gave you to gently clean your skin night before and morning of surgery. If you do not have soap from your surgeon, use your regular soap.[facility] patients can receive free Chlorhexidine Gluconate 4% soap for surgery at an outpatient [facility] pharmacy.  Do not shave the surgery site.  Wear clean pajamas and have clean sheets on your bed.  Brush teeth morning of surgery to reduce risk of infection.

## 2023-06-16 ENCOUNTER — LAB (OUTPATIENT)
Dept: LAB | Facility: OTHER | Age: 68
End: 2023-06-16
Payer: MEDICARE

## 2023-06-16 DIAGNOSIS — N30.00 ACUTE CYSTITIS WITHOUT HEMATURIA: ICD-10-CM

## 2023-06-16 DIAGNOSIS — R10.9 ABDOMINAL CRAMPING: ICD-10-CM

## 2023-06-16 DIAGNOSIS — R19.5 LOOSE STOOLS: ICD-10-CM

## 2023-06-16 DIAGNOSIS — E78.2 MIXED HYPERLIPIDEMIA: ICD-10-CM

## 2023-06-16 DIAGNOSIS — Z01.818 PREOP GENERAL PHYSICAL EXAM: ICD-10-CM

## 2023-06-16 DIAGNOSIS — N39.46 MIXED INCONTINENCE: ICD-10-CM

## 2023-06-16 DIAGNOSIS — Z83.2 FAMILY HISTORY OF BLEEDING DISORDER: ICD-10-CM

## 2023-06-16 DIAGNOSIS — R74.8 ELEVATED ALKALINE PHOSPHATASE LEVEL: ICD-10-CM

## 2023-06-16 DIAGNOSIS — F17.200 TOBACCO DEPENDENCE: Chronic | ICD-10-CM

## 2023-06-16 LAB
ALBUMIN UR-MCNC: NEGATIVE MG/DL
APPEARANCE UR: CLEAR
BACTERIA #/AREA URNS HPF: ABNORMAL /HPF
BILIRUB UR QL STRIP: NEGATIVE
COLOR UR AUTO: ABNORMAL
GLUCOSE UR STRIP-MCNC: NEGATIVE MG/DL
HGB UR QL STRIP: NEGATIVE
HYALINE CASTS: 2 /LPF
KETONES UR STRIP-MCNC: NEGATIVE MG/DL
LEUKOCYTE ESTERASE UR QL STRIP: NEGATIVE
MUCOUS THREADS #/AREA URNS LPF: PRESENT /LPF
NITRATE UR QL: POSITIVE
PH UR STRIP: 7 [PH] (ref 4.7–8)
RBC URINE: 4 /HPF
SP GR UR STRIP: 1.01 (ref 1–1.03)
SQUAMOUS EPITHELIAL: 1 /HPF
UROBILINOGEN UR STRIP-MCNC: NORMAL MG/DL
WBC URINE: 7 /HPF

## 2023-06-16 PROCEDURE — 81001 URINALYSIS AUTO W/SCOPE: CPT | Mod: ZL

## 2023-06-16 PROCEDURE — 87086 URINE CULTURE/COLONY COUNT: CPT | Mod: ZL

## 2023-06-16 RX ORDER — NITROFURANTOIN 25; 75 MG/1; MG/1
100 CAPSULE ORAL 2 TIMES DAILY
Qty: 10 CAPSULE | Refills: 0 | Status: ON HOLD | OUTPATIENT
Start: 2023-06-16 | End: 2023-06-22

## 2023-06-16 NOTE — RESULT ENCOUNTER NOTE
Please call and notify patient that she has a urinary tract infection. Needs antibiotic - I sent in macrobid to take twice daily for five days to her pharmacy.

## 2023-06-16 NOTE — OR NURSING
Instructed to Hold NSAIDs, ASA, All vitamins & supplements starting 6/15. No medications to take on day of procedure.

## 2023-06-18 LAB — BACTERIA UR CULT: ABNORMAL

## 2023-06-20 ENCOUNTER — ANESTHESIA EVENT (OUTPATIENT)
Dept: SURGERY | Facility: HOSPITAL | Age: 68
End: 2023-06-20
Payer: MEDICARE

## 2023-06-20 ASSESSMENT — LIFESTYLE VARIABLES: TOBACCO_USE: 1

## 2023-06-20 NOTE — ANESTHESIA PREPROCEDURE EVALUATION
Anesthesia Pre-Procedure Evaluation    Patient: Delores Mac   MRN: [id number] : 1955        Procedure : Procedure(s):  COLONOSCOPY          Past Medical History:   Diagnosis Date     Closed 3-part fracture of proximal end of left humerus with malunion 10/5/2018     Shoulder stiffness, left 2018      Past Surgical History:   Procedure Laterality Date     BREAST LUMPECTOMY, RT/LT Left 1999    Biopsy results Bartholin cyst.      HYSTERECTOMY TOTAL ABDOMINAL, BILATERAL SALPINGO-OOPHORECTOMY, COMBINED Bilateral 1999    Performed at [facility] by Dr. LAURA Michael for fibroid uterus.      TOE SURGERY Bilateral     end of toes cut off from bone infection. had chronic ingrown nails.      No Known Allergies   Social History     Tobacco Use     Smoking status: Every Day     Packs/day: 0.75     Years: 50.00     Pack years: 37.50     Types: Cigarettes     Start date:      Smokeless tobacco: Never   Vaping Use     Vaping status: Never Used   Substance Use Topics     Alcohol use: No      Wt Readings from Last 1 Encounters:   06/15/23 94.3 kg (208 lb)        Anesthesia Evaluation   Pt has had prior anesthetic.         ROS/MED HX  ENT/Pulmonary:     (+) MRAYSE risk factors, snores loudly, tobacco use, Current use,     Neurologic:  - neg neurologic ROS     Cardiovascular:     (+) Dyslipidemia -----    METS/Exercise Tolerance:     Hematologic:  - neg hematologic  ROS     Musculoskeletal:  - neg musculoskeletal ROS     GI/Hepatic:     (+) bowel prep,     Renal/Genitourinary:  - neg Renal ROS     Endo:     (+) Obesity,     Psychiatric/Substance Use:       Infectious Disease:  - neg infectious disease ROS     Malignancy:  - neg malignancy ROS     Other:  - neg other ROS          Physical Exam    Airway  airway exam normal      Mallampati: III   TM distance: > 3 FB   Neck ROM: full   Mouth opening: > 3 cm    Respiratory Devices and Support         Dental       (+) Modest Abnormalities -  crowns, retainers, 1 or 2 missing teeth      Cardiovascular   cardiovascular exam normal       Rhythm and rate: regular and normal     Pulmonary   pulmonary exam normal        breath sounds clear to auscultation           OUTSIDE LABS:  CBC:   Lab Results   Component Value Date    WBC 11.3 (H) 05/15/2023    WBC 18.1 (H) 10/05/2018    HGB 14.6 06/15/2023    HGB 15.2 05/15/2023    HCT 45.6 05/15/2023    HCT 45.0 10/05/2018     05/15/2023     10/05/2018     BMP:   Lab Results   Component Value Date     05/15/2023     10/05/2018    POTASSIUM 4.3 05/15/2023    POTASSIUM 4.1 10/05/2018    CHLORIDE 101 05/15/2023    CHLORIDE 107 10/05/2018    CO2 23 05/15/2023    CO2 25 10/05/2018    BUN 17.2 05/15/2023    BUN 15 10/05/2018    CR 0.80 05/15/2023    CR 0.72 10/05/2018     (H) 05/15/2023     (H) 10/05/2018     COAGS:   Lab Results   Component Value Date    PTT 29 06/15/2023    INR 0.94 06/15/2023     POC: No results found for: BGM, HCG, HCGS  HEPATIC:   Lab Results   Component Value Date    ALBUMIN 4.3 05/15/2023    PROTTOTAL 7.3 05/15/2023    ALT 29 05/15/2023    AST 23 05/15/2023    GGT 64 (H) 05/15/2023    ALKPHOS 141 (H) 05/15/2023    BILITOTAL 0.3 05/15/2023     OTHER:   Lab Results   Component Value Date    A1C 5.4 05/15/2023    CHIOMA 9.6 05/15/2023    TSH 1.87 05/15/2023       Anesthesia Plan    ASA Status:  2   NPO Status:  NPO Appropriate    Anesthesia Type: MAC.     - Reason for MAC: straight local not clinically adequate              Consents    Anesthesia Plan(s) and associated risks, benefits, and realistic alternatives discussed. Questions answered and patient/representative(s) expressed understanding.     - Discussed: Risks, Benefits and Alternatives for BOTH SEDATION and the PROCEDURE were discussed     - Discussed with:  Patient      - Extended Intubation/Ventilatory Support Discussed: No.      - Patient is DNR/DNI Status: No    Use of blood products discussed: No .      Postoperative Care            Comments:    Other Comments:  6/15/23            GRABIEL Alvarez CRNA

## 2023-06-22 ENCOUNTER — ANESTHESIA (OUTPATIENT)
Dept: SURGERY | Facility: HOSPITAL | Age: 68
End: 2023-06-22
Payer: MEDICARE

## 2023-06-22 ENCOUNTER — HOSPITAL ENCOUNTER (OUTPATIENT)
Facility: HOSPITAL | Age: 68
Discharge: HOME | End: 2023-06-22
Attending: SURGERY | Admitting: SURGERY
Payer: MEDICARE

## 2023-06-22 VITALS
BODY MASS INDEX: 29.92 KG/M2 | WEIGHT: 202 LBS | HEIGHT: 69 IN | TEMPERATURE: 97.9 F | RESPIRATION RATE: 16 BRPM | OXYGEN SATURATION: 96 % | HEART RATE: 84 BPM | DIASTOLIC BLOOD PRESSURE: 75 MMHG | SYSTOLIC BLOOD PRESSURE: 138 MMHG

## 2023-06-22 PROCEDURE — 250N000009 HC RX 250: Performed by: NURSE ANESTHETIST, CERTIFIED REGISTERED

## 2023-06-22 PROCEDURE — 360N000075 HC SURGERY LEVEL 2, PER MIN: Performed by: SURGERY

## 2023-06-22 PROCEDURE — 88305 TISSUE EXAM BY PATHOLOGIST: CPT | Mod: TC | Performed by: SURGERY

## 2023-06-22 PROCEDURE — 258N000003 HC RX IP 258 OP 636: Performed by: NURSE ANESTHETIST, CERTIFIED REGISTERED

## 2023-06-22 PROCEDURE — 272N000001 HC OR GENERAL SUPPLY STERILE: Performed by: SURGERY

## 2023-06-22 PROCEDURE — 370N000017 HC ANESTHESIA TECHNICAL FEE, PER MIN: Performed by: SURGERY

## 2023-06-22 PROCEDURE — 999N000141 HC STATISTIC PRE-PROCEDURE NURSING ASSESSMENT: Performed by: SURGERY

## 2023-06-22 PROCEDURE — 45385 COLONOSCOPY W/LESION REMOVAL: CPT | Performed by: SURGERY

## 2023-06-22 PROCEDURE — 45385 COLONOSCOPY W/LESION REMOVAL: CPT | Performed by: NURSE ANESTHETIST, CERTIFIED REGISTERED

## 2023-06-22 PROCEDURE — 88305 TISSUE EXAM BY PATHOLOGIST: CPT | Mod: 26 | Performed by: PATHOLOGY

## 2023-06-22 PROCEDURE — 250N000011 HC RX IP 250 OP 636: Performed by: NURSE ANESTHETIST, CERTIFIED REGISTERED

## 2023-06-22 PROCEDURE — 710N000012 HC RECOVERY PHASE 2, PER MINUTE: Performed by: SURGERY

## 2023-06-22 RX ORDER — OXYCODONE HYDROCHLORIDE 5 MG/1
10 TABLET ORAL
Status: DISCONTINUED | OUTPATIENT
Start: 2023-06-22 | End: 2023-06-22 | Stop reason: HOSPADM

## 2023-06-22 RX ORDER — LIDOCAINE HYDROCHLORIDE 20 MG/ML
INJECTION, SOLUTION INFILTRATION; PERINEURAL PRN
Status: DISCONTINUED | OUTPATIENT
Start: 2023-06-22 | End: 2023-06-22

## 2023-06-22 RX ORDER — ONDANSETRON 4 MG/1
4 TABLET, ORALLY DISINTEGRATING ORAL EVERY 30 MIN PRN
Status: DISCONTINUED | OUTPATIENT
Start: 2023-06-22 | End: 2023-06-22 | Stop reason: HOSPADM

## 2023-06-22 RX ORDER — SODIUM CHLORIDE, SODIUM LACTATE, POTASSIUM CHLORIDE, CALCIUM CHLORIDE 600; 310; 30; 20 MG/100ML; MG/100ML; MG/100ML; MG/100ML
INJECTION, SOLUTION INTRAVENOUS CONTINUOUS
Status: DISCONTINUED | OUTPATIENT
Start: 2023-06-22 | End: 2023-06-22 | Stop reason: HOSPADM

## 2023-06-22 RX ORDER — OXYCODONE HYDROCHLORIDE 5 MG/1
5 TABLET ORAL
Status: DISCONTINUED | OUTPATIENT
Start: 2023-06-22 | End: 2023-06-22 | Stop reason: HOSPADM

## 2023-06-22 RX ORDER — LIDOCAINE 40 MG/G
CREAM TOPICAL
Status: DISCONTINUED | OUTPATIENT
Start: 2023-06-22 | End: 2023-06-22 | Stop reason: HOSPADM

## 2023-06-22 RX ORDER — PROPOFOL 10 MG/ML
INJECTION, EMULSION INTRAVENOUS PRN
Status: DISCONTINUED | OUTPATIENT
Start: 2023-06-22 | End: 2023-06-22

## 2023-06-22 RX ORDER — ONDANSETRON 2 MG/ML
4 INJECTION INTRAMUSCULAR; INTRAVENOUS EVERY 30 MIN PRN
Status: DISCONTINUED | OUTPATIENT
Start: 2023-06-22 | End: 2023-06-22 | Stop reason: HOSPADM

## 2023-06-22 RX ADMIN — PROPOFOL 50 MG: 10 INJECTION, EMULSION INTRAVENOUS at 10:42

## 2023-06-22 RX ADMIN — PROPOFOL 20 MG: 10 INJECTION, EMULSION INTRAVENOUS at 10:37

## 2023-06-22 RX ADMIN — PROPOFOL 50 MG: 10 INJECTION, EMULSION INTRAVENOUS at 10:13

## 2023-06-22 RX ADMIN — PROPOFOL 30 MG: 10 INJECTION, EMULSION INTRAVENOUS at 10:29

## 2023-06-22 RX ADMIN — SODIUM CHLORIDE, POTASSIUM CHLORIDE, SODIUM LACTATE AND CALCIUM CHLORIDE: 600; 310; 30; 20 INJECTION, SOLUTION INTRAVENOUS at 09:52

## 2023-06-22 RX ADMIN — PROPOFOL 30 MG: 10 INJECTION, EMULSION INTRAVENOUS at 10:31

## 2023-06-22 RX ADMIN — PROPOFOL 30 MG: 10 INJECTION, EMULSION INTRAVENOUS at 10:25

## 2023-06-22 RX ADMIN — PROPOFOL 50 MG: 10 INJECTION, EMULSION INTRAVENOUS at 10:14

## 2023-06-22 RX ADMIN — PROPOFOL 30 MG: 10 INJECTION, EMULSION INTRAVENOUS at 10:34

## 2023-06-22 RX ADMIN — PROPOFOL 50 MG: 10 INJECTION, EMULSION INTRAVENOUS at 10:16

## 2023-06-22 RX ADMIN — PROPOFOL 30 MG: 10 INJECTION, EMULSION INTRAVENOUS at 10:22

## 2023-06-22 RX ADMIN — PROPOFOL 30 MG: 10 INJECTION, EMULSION INTRAVENOUS at 10:28

## 2023-06-22 RX ADMIN — LIDOCAINE HYDROCHLORIDE 40 MG: 20 INJECTION, SOLUTION INFILTRATION; PERINEURAL at 10:13

## 2023-06-22 RX ADMIN — PROPOFOL 50 MG: 10 INJECTION, EMULSION INTRAVENOUS at 10:19

## 2023-06-22 RX ADMIN — PROPOFOL 50 MG: 10 INJECTION, EMULSION INTRAVENOUS at 10:41

## 2023-06-22 ASSESSMENT — ACTIVITIES OF DAILY LIVING (ADL): ADLS_ACUITY_SCORE: 35

## 2023-06-22 NOTE — ANESTHESIA CARE TRANSFER NOTE
Patient: Delores Mac    Procedure: Procedure(s):  COLONOSCOPY with polypectomy       Diagnosis: Diarrhea [R19.7]  Diagnosis Additional Information: No value filed.    Anesthesia Type:   MAC     Note:    Oropharynx: oropharynx clear of all foreign objects and spontaneously breathing  Level of Consciousness: drowsy  Oxygen Supplementation: nasal cannula  Level of Supplemental Oxygen (L/min / FiO2): 2  Independent Airway: airway patency satisfactory and stable  Dentition: dentition unchanged  Vital Signs Stable: post-procedure vital signs reviewed and stable  Report to RN Given: handoff report given  Patient transferred to: Phase II    Handoff Report: Identifed the Patient, Identified the Reponsible Provider, Reviewed the pertinent medical history, Discussed the surgical course, Reviewed Intra-OP anesthesia mangement and issues during anesthesia, Set expectations for post-procedure period and Allowed opportunity for questions and acknowledgement of understanding      Vitals:  Vitals Value Taken Time   BP     Temp     Pulse     Resp     SpO2         Electronically Signed By: GRABIEL Maya CRNA  June 22, 2023  10:50 AM

## 2023-06-22 NOTE — DISCHARGE INSTRUCTIONS

## 2023-06-22 NOTE — OP NOTE
REPORT OF OPERATION  DATE OF PROCEDURE: 6/22/2023    PATIENT: Delores Mac    SURGERY PERFORMED:   Colonoscopy     with biopsy    with use of snare    without tattooing    PREOPERATIVE DIAGNOSIS: Change in Bowel Habits (constipation)    POSTOPERATIVE DIAGNOSIS:    Same   Colon polyps, 70, 20 (x3) and rectum cm   Diverticulosis was identified.   Hemorrhoids  were  identified.    SURGEON: Ramez Corona MD    ASSISTANTS: None    ANESTHESIA: Monitored Anesthesia Care    COMPLICATIONS: None apparent    TRANSFUSIONS: None     TISSUE TO PATHOLOGY: Polyps from 70, 20 (x3) and rectum were sent to pathology for pathological diagnosis.    FINDINGS: Colon polyps, 70, 20 (x3) and rectum.  Diverticulosis was identified.  Hemorrhoids  were  identified.    INDICATIONS: This is a 67 year old female in need of a colonoscopy for Change in Bowel Habits (thinning).  The patient will be taken to the endoscopy suite for that procedure.    DESCRIPTIONS OF PROCEDURE IN DETAIL: After consent was obtained the patient was taken to the endoscopy suite and placed in the left lateral decubitus position.  The patient was identified and the correct patient was confirmed.  Monitored Anesthesia Care was given.  A time out was performed verifying the correct patient and the correct procedure.  The entire operative team was in agreement.  All necessary equipment and supplies were in the room.    Rectal exam was performed and no lesions of the anal canal were noted.  The colonoscope was inserted into the anus and passed without difficulty to the cecum.  The cecum was identified by the ileocecal valve, the coalescence of the tinea and the appendiceal orifice.  Upon withdrawal all walls of the colon were visualized.  Polyps were identified at 70, 20 (x3) and rectum.  These were removed by snare.  Tattooing their of the location was not done.  Large colon masses and colitis were not seen.colon  Diverticulosis was seen.  Upon reaching the rectum the  scope was retroflexed and internal hemorrhoids  were  seen.  The scope was straightened back out and removed from the patient.  The patient was then taken to the recovery room in stable condition tolerating the procedure well.      Prep: fair    Withdrawal time was 10 minutes.    It is recommended that the patient have another colonoscopy in 2 years.

## 2023-06-22 NOTE — INTERVAL H&P NOTE
"I have reviewed the surgical (or preoperative) H&P that is linked to this encounter, and examined the patient. There are no significant changes    Clinical Conditions Present on Arrival:  Clinically Significant Risk Factors Present on Admission                  # Overweight: Estimated body mass index is 29.83 kg/m  as calculated from the following:    Height as of this encounter: 1.753 m (5' 9\").    Weight as of this encounter: 91.6 kg (202 lb).       "

## 2023-06-22 NOTE — ANESTHESIA POSTPROCEDURE EVALUATION
Patient: Delores Mac    Procedure: Procedure(s):  COLONOSCOPY with polypectomy       Anesthesia Type:  MAC    Note:  Disposition: Outpatient   Postop Pain Control: Uneventful            Sign Out: Well controlled pain   PONV: No   Neuro/Psych: Uneventful            Sign Out: Acceptable/Baseline neuro status   Airway/Respiratory: Uneventful            Sign Out: Acceptable/Baseline resp. status   CV/Hemodynamics: Uneventful            Sign Out: Acceptable CV status; No obvious hypovolemia; No obvious fluid overload   Other NRE: NONE   DID A NON-ROUTINE EVENT OCCUR? No           Last vitals:  Vitals Value Taken Time   /75 06/22/23 1120   Temp 97.9  F (36.6  C) 06/22/23 1120   Pulse 84 06/22/23 1120   Resp 16 06/22/23 1120   SpO2 98 % 06/22/23 1125   Vitals shown include unvalidated device data.    Electronically Signed By: GRABIEL Maya CRNA  June 22, 2023  11:36 AM

## 2023-06-22 NOTE — PLAN OF CARE
Patient and responsible adult given discharge instructions with no questions regarding instructions. Kunal score 19. Pain level 0/10.  Discharged from unit via ambulating. Patient discharged to home.

## 2023-06-25 LAB
PATH REPORT.COMMENTS IMP SPEC: NORMAL
PATH REPORT.FINAL DX SPEC: NORMAL
PATH REPORT.GROSS SPEC: NORMAL
PATH REPORT.MICROSCOPIC SPEC OTHER STN: NORMAL
PATH REPORT.RELEVANT HX SPEC: NORMAL
PHOTO IMAGE: NORMAL

## 2023-06-30 ENCOUNTER — HOSPITAL ENCOUNTER (OUTPATIENT)
Dept: CT IMAGING | Facility: HOSPITAL | Age: 68
Discharge: HOME | End: 2023-06-30
Attending: SURGERY
Payer: MEDICARE

## 2023-06-30 ENCOUNTER — HOSPITAL ENCOUNTER (OUTPATIENT)
Dept: CT IMAGING | Facility: HOSPITAL | Age: 68
Discharge: HOME | End: 2023-06-30
Attending: STUDENT IN AN ORGANIZED HEALTH CARE EDUCATION/TRAINING PROGRAM
Payer: MEDICARE

## 2023-06-30 ENCOUNTER — HOSPITAL ENCOUNTER (OUTPATIENT)
Dept: ULTRASOUND IMAGING | Facility: HOSPITAL | Age: 68
Discharge: HOME | End: 2023-06-30
Attending: STUDENT IN AN ORGANIZED HEALTH CARE EDUCATION/TRAINING PROGRAM
Payer: MEDICARE

## 2023-06-30 ENCOUNTER — ANCILLARY PROCEDURE (OUTPATIENT)
Dept: MAMMOGRAPHY | Facility: OTHER | Age: 68
End: 2023-06-30
Attending: STUDENT IN AN ORGANIZED HEALTH CARE EDUCATION/TRAINING PROGRAM
Payer: MEDICARE

## 2023-06-30 DIAGNOSIS — R19.8 ALTERED BOWEL FUNCTION: ICD-10-CM

## 2023-06-30 DIAGNOSIS — R10.84 ABDOMINAL PAIN, GENERALIZED: ICD-10-CM

## 2023-06-30 DIAGNOSIS — Z87.891 PERSONAL HISTORY OF TOBACCO USE: ICD-10-CM

## 2023-06-30 DIAGNOSIS — Z12.31 BREAST CANCER SCREENING BY MAMMOGRAM: ICD-10-CM

## 2023-06-30 DIAGNOSIS — R74.8 ELEVATED ALKALINE PHOSPHATASE LEVEL: ICD-10-CM

## 2023-06-30 PROBLEM — R91.8 PULMONARY NODULES: Status: ACTIVE | Noted: 2023-06-30

## 2023-06-30 PROBLEM — R91.8 PULMONARY NODULES: Chronic | Status: ACTIVE | Noted: 2023-06-30

## 2023-06-30 PROCEDURE — 77067 SCR MAMMO BI INCL CAD: CPT | Mod: TC

## 2023-06-30 PROCEDURE — 250N000011 HC RX IP 250 OP 636: Performed by: RADIOLOGY

## 2023-06-30 PROCEDURE — 74177 CT ABD & PELVIS W/CONTRAST: CPT

## 2023-06-30 PROCEDURE — 76705 ECHO EXAM OF ABDOMEN: CPT

## 2023-06-30 PROCEDURE — 71271 CT THORAX LUNG CANCER SCR C-: CPT

## 2023-06-30 RX ORDER — IOPAMIDOL 755 MG/ML
90 INJECTION, SOLUTION INTRAVASCULAR ONCE
Status: COMPLETED | OUTPATIENT
Start: 2023-06-30 | End: 2023-06-30

## 2023-06-30 RX ORDER — IOPAMIDOL 755 MG/ML
17 INJECTION, SOLUTION INTRAVASCULAR ONCE
Status: COMPLETED | OUTPATIENT
Start: 2023-06-30 | End: 2023-06-30

## 2023-06-30 RX ADMIN — IOPAMIDOL 17 ML: 755 INJECTION, SOLUTION INTRAVENOUS at 10:37

## 2023-06-30 RX ADMIN — IOPAMIDOL 90 ML: 755 INJECTION, SOLUTION INTRAVENOUS at 10:38

## 2023-06-30 NOTE — RESULT ENCOUNTER NOTE
Please also call and notify patient with info in [facility] message. Please let me know if she'd like to see general surgery to discuss gallbladder removal.

## 2023-07-06 ENCOUNTER — TELEPHONE (OUTPATIENT)
Dept: FAMILY MEDICINE | Facility: OTHER | Age: 68
End: 2023-07-06

## 2023-07-07 ENCOUNTER — MYC MEDICAL ADVICE (OUTPATIENT)
Dept: FAMILY MEDICINE | Facility: OTHER | Age: 68
End: 2023-07-07

## 2023-07-07 DIAGNOSIS — K80.20 CALCULUS OF GALLBLADDER WITHOUT CHOLECYSTITIS WITHOUT OBSTRUCTION: Primary | ICD-10-CM

## 2023-07-07 NOTE — TELEPHONE ENCOUNTER
Spoke with patient regarding MyChart messages. She stated that she would like to have her Gall Bladder removed. General SX pended.

## 2023-07-11 ENCOUNTER — PREP FOR PROCEDURE (OUTPATIENT)
Dept: SURGERY | Facility: OTHER | Age: 68
End: 2023-07-11

## 2023-07-11 ENCOUNTER — OFFICE VISIT (OUTPATIENT)
Dept: SURGERY | Facility: OTHER | Age: 68
End: 2023-07-11
Attending: SURGERY
Payer: COMMERCIAL

## 2023-07-11 VITALS
SYSTOLIC BLOOD PRESSURE: 134 MMHG | DIASTOLIC BLOOD PRESSURE: 76 MMHG | HEART RATE: 110 BPM | TEMPERATURE: 99 F | HEIGHT: 69 IN | WEIGHT: 208 LBS | OXYGEN SATURATION: 97 % | BODY MASS INDEX: 30.81 KG/M2

## 2023-07-11 DIAGNOSIS — K80.20 CALCULUS OF GALLBLADDER WITHOUT CHOLECYSTITIS WITHOUT OBSTRUCTION: ICD-10-CM

## 2023-07-11 DIAGNOSIS — K80.20 CHOLELITHIASES: Primary | ICD-10-CM

## 2023-07-11 PROCEDURE — G0463 HOSPITAL OUTPT CLINIC VISIT: HCPCS

## 2023-07-11 PROCEDURE — 99214 OFFICE O/P EST MOD 30 MIN: CPT | Performed by: SURGERY

## 2023-07-11 ASSESSMENT — PAIN SCALES - GENERAL: PAINLEVEL: NO PAIN (0)

## 2023-07-11 NOTE — PROGRESS NOTES
CLINIC NOTE - CONSULT  7/11/2023    Patient : Delores Mac  Referring Physician : Jaimee Rueda    Reason for Referral : Symptomoatic Cholelithiasis    This is a 67 year old female with Symptomoatic Cholelithiasis.     Fatty food intolerance : YES   History of jaundice : NO   History of Pancreatitis : NO      Ultrasound shows cholelithiasis : YES    Evidence of choledocholithiasis : NO    Past Medical History:  Past Medical History:   Diagnosis Date     Closed 3-part fracture of proximal end of left humerus with malunion 10/5/2018     Shoulder stiffness, left 12/20/2018       Past Surgical History:  Past Surgical History:   Procedure Laterality Date     BREAST LUMPECTOMY, RT/LT Left 05/04/1999    Biopsy results Bartholin cyst.      COLONOSCOPY N/A 6/22/2023    Procedure: COLONOSCOPY with polypectomy;  Surgeon: Ramez Corona MD;  Location: [facility] OR     HYSTERECTOMY TOTAL ABDOMINAL, BILATERAL SALPINGO-OOPHORECTOMY, COMBINED Bilateral 05/04/1999    Performed at [facility] by Dr. LAURA Michael for fibroid uterus.      TOE SURGERY Bilateral     end of toes cut off from bone infection. had chronic ingrown nails.       Family History History:  Family History   Problem Relation Age of Onset     Breast Cancer Mother      Thyroid Disease Mother      Lung Cancer Father         mesothelioma     Hashimoto's thyroiditis Sister      Heart Disease Brother 72     Aneurysm Maternal Grandmother      Bone Cancer Maternal Grandfather        History of Tobacco Use:  History   Smoking Status     Every Day     Packs/day: 0.75     Years: 50.00     Types: Cigarettes     Start date: 1973   Smokeless Tobacco     Never       Current Medications:  Current Outpatient Medications   Medication Sig Dispense Refill     Calcium Carbonate (CALCIUM 600 PO) Take 1 tablet by mouth daily       Cyanocobalamin 5000 MCG SUBL Place 1 tablet under the tongue daily       diphenhydrAMINE-APAP, sleep, (TYLENOL PM EXTRA STRENGTH  "PO) Take 2 tablets by mouth daily       glucosamine-chondroitinoitin 750-600 MG TABS Take 1 tablet by mouth 2 times daily       Multiple Vitamins-Minerals (PRESERVISION AREDS PO) Take 1 tablet by mouth 2 times daily       rosuvastatin (CRESTOR) 5 MG tablet Take 1 tablet (5 mg) by mouth daily 90 tablet 0     Vitamin D3 (CHOLECALCIFEROL) 125 MCG (5000 UT) tablet Take 1 tablet by mouth 2 times daily         Allergies:  No Known Allergies    ROS:  Pertinent items are noted in HPI.  All other systems are negative.    PHYSICAL EXAM:     Vital signs: /76 (Cuff Size: Adult Regular)   Pulse 110   Temp 99  F (37.2  C) (Tympanic)   Ht 1.753 m (5' 9\")   Wt 94.3 kg (208 lb)   SpO2 97%   BMI 30.72 kg/m     Weight: [unfilled]   BMI: Body mass index is 30.72 kg/m .   General: Normal, healthy, cooperative, in no acute distress, alert   Skin: no jaundice   HEENT: PERRLA and EOMI   Neck: supple     LABORATORY:  CBC RESULTS:   Recent Labs   Lab Test 06/15/23  1012 05/15/23  1107   WBC  --  11.3*   RBC  --  4.89   HGB 14.6 15.2   HCT  --  45.6   MCV  --  93   MCH  --  31.1   MCHC  --  33.3   RDW  --  13.2   PLT  --  276       Last Comprehensive Metabolic Panel:  Sodium   Date Value Ref Range Status   05/15/2023 138 136 - 145 mmol/L Final   10/05/2018 138 133 - 144 mmol/L Final     Potassium   Date Value Ref Range Status   05/15/2023 4.3 3.4 - 5.3 mmol/L Final   10/05/2018 4.1 3.4 - 5.3 mmol/L Final     Chloride   Date Value Ref Range Status   05/15/2023 101 98 - 107 mmol/L Final   10/05/2018 107 94 - 109 mmol/L Final     Carbon Dioxide   Date Value Ref Range Status   10/05/2018 25 20 - 32 mmol/L Final     Carbon Dioxide (CO2)   Date Value Ref Range Status   05/15/2023 23 22 - 29 mmol/L Final     Anion Gap   Date Value Ref Range Status   05/15/2023 14 7 - 15 mmol/L Final   10/05/2018 6 3 - 14 mmol/L Final     Glucose   Date Value Ref Range Status   05/15/2023 111 (H) 70 - 99 mg/dL Final   10/05/2018 104 (H) 70 - 99 mg/dL Final "     Urea Nitrogen   Date Value Ref Range Status   05/15/2023 17.2 8.0 - 23.0 mg/dL Final   10/05/2018 15 7 - 30 mg/dL Final     Creatinine   Date Value Ref Range Status   05/15/2023 0.80 0.51 - 0.95 mg/dL Final   10/05/2018 0.72 0.52 - 1.04 mg/dL Final     GFR Estimate   Date Value Ref Range Status   05/15/2023 80 >60 mL/min/1.73m2 Final     Comment:     eGFR calculated using 2021 CKD-EPI equation.   10/05/2018 82 >60 mL/min/1.7m2 Final     Comment:     Non  GFR Calc     Calcium   Date Value Ref Range Status   05/15/2023 9.6 8.8 - 10.2 mg/dL Final   10/05/2018 8.8 8.5 - 10.1 mg/dL Final     Bilirubin Total   Date Value Ref Range Status   05/15/2023 0.3 <=1.2 mg/dL Final   10/05/2018 0.3 0.2 - 1.3 mg/dL Final     Alkaline Phosphatase   Date Value Ref Range Status   05/15/2023 141 (H) 35 - 104 U/L Final   10/05/2018 117 40 - 150 U/L Final     ALT   Date Value Ref Range Status   05/15/2023 29 10 - 35 U/L Final   10/05/2018 33 0 - 50 U/L Final     AST   Date Value Ref Range Status   05/15/2023 23 10 - 35 U/L Final   10/05/2018 13 0 - 45 U/L Final       ASSESSMENT:    67 year old female with Symptomoatic Cholelithiasis.        PLAN:   Will plan on a laparoscopic cholecystectomy possible open procedure.  The procedure was explained with its risk, benefits and alternatives.  Risks include but are not limited to conversion to an open procedure, wound infections, development of an incisional hernia, damager to internal organs, damage to the common bile duct, need for additional procedures.  All of the patients questions were answered.  Patient consents to proceed.  The procedure will be scheduled.     Preoperative physical is needed : YES

## 2023-07-11 NOTE — PATIENT INSTRUCTIONS
Thank you for allowing Dr. Corona and our surgical team to participate in your care. Please call our health unit coordinator at [phone] with scheduling questions or the nurse at [phone] with any other questions or concerns

## 2023-08-14 NOTE — OR NURSING
Instructed to Hold NSAIDs, ASA, vitamins & supplements starting today 8/14, Continue rosuvastatin, sinus tab (acetaminophen) & tylenol PM up to night before.  No Medications to take day of surgery.

## 2023-08-15 NOTE — H&P (VIEW-ONLY)
[facility] [place]  [address]  [place] [address]  Phone: [phone]  Primary Provider: Melissa Burr  Pre-op Performing Provider: MELISSA BURR    PREOPERATIVE EVALUATION:  Today's date: 8/16/2023    Delores Mac is a 68 year old female who presents for a preoperative evaluation.    Surgical Information:  Surgery/Procedure: Laparoscopic Cholecystectomy   [place] [facility] [place]: Dr. Corona   Surgery Date: 8/21/2023  Time of Surgery: unknown   Where patient plans to recover: At home with family  Fax number for surgical facility: Note does not need to be faxed, will be available electronically in Epic.    Assessment & Plan     The proposed surgical procedure is considered INTERMEDIATE risk.    Preop general physical exam  Optimized for procedure.   - CBC with Platelets & Differential; Future  - CBC with Platelets & Differential    Calculus of gallbladder without cholecystitis without obstruction  Reason for surgery.     Mixed hyperlipidemia  Stable. Updating lipids and LFTs after 3 months on Crestor today. Will continue Crestor.   The 10-year ASCVD risk score (Theresa DK, et al., 2019) is: 13.7%    Values used to calculate the score:      Age: 68 years      Sex: Female      Is Non- : No      Diabetic: No      Tobacco smoker: Yes      Systolic Blood Pressure: 130 mmHg      Is BP treated: No      HDL Cholesterol: 59 mg/dL      Total Cholesterol: 181 mg/dL    - rosuvastatin (CRESTOR) 5 MG tablet; Take 1 tablet (5 mg) by mouth daily  - Comprehensive metabolic panel (BMP + Alb, Alk Phos, ALT, AST, Total. Bili, TP); Future  - Lipid Profile (Chol, Trig, HDL, LDL calc); Future  - Comprehensive metabolic panel (BMP + Alb, Alk Phos, ALT, AST, Total. Bili, TP)  - Lipid Profile (Chol, Trig, HDL, LDL calc)    Tobacco dependence (50 years, 0.75ppd)  Noted.     Pulmonary nodules  Right middle lobe benign appearing nodule noted on low dose lung cancer CT 6/30/23.      Abnormal lung sounds  Slight crackles appreciated in bases but no symptoms of orthopnea, PND or edema. Did have reassuring CT chest for lung cancer screening 6/30/23. CXR done today to evaluate for any effusion - no effusion or concerning acute findings. Some emphysematous blebs. No contraindications to surgery.   Recommend, with her chronic cough, we get PFTs. Suspect chronic bronchitis. Strongly encouraged smoking cessation. She was agreeable.   - XR CHEST 2 VW (Clinic Performed); Future       - No identified additional risk factors other than previously addressed    Antiplatelet or Anticoagulation Medication Instructions:   - Patient is on no antiplatelet or anticoagulation medications.    Additional Medication Instructions:  Patient is to take all scheduled medications on the day of surgery EXCEPT for modifications listed below:  Vitamins and supplements    RECOMMENDATION:  APPROVAL GIVEN to proceed with proposed procedure, without further diagnostic evaluation.      Subjective     HPI related to upcoming procedure: patient has a long history of fatty food intolerance. Alk phos was noted to be elevated. Ultrasound of gallbladder revealed a stone in the neck of the gallbladder with others in the gallbladder. Planning cholecystectomy with Dr. Corona.     Has been feeling well with no fevers, congestion, cough, or other URI symptoms.   No ischemic cardiac history. Denies any shortness of breath, chest pain, or dyspnea on exertion.   Is able to function at >4 METS.   Has had prior surgeries with general anesthesia and did well.     She does have 3+ months of infrequent, phlegm like cough. No fevers, chills, weight loss or night sweats. No new dyspnea or hemoptysis. Does have long smoking history. Had CT chest for lung cancer screening 6/30/23 that was reassuring, with a pulmonary nodule to monitor.         8/10/2023     2:30 PM   Preop Questions   1. Have you ever had a heart attack or stroke? No   2. Have you  ever had surgery on your heart or blood vessels, such as a stent placement, a coronary artery bypass, or surgery on an artery in your head, neck, heart, or legs? No   3. Do you have chest pain with activity? No   4. Do you have a history of  heart failure? No   5. Do you currently have a cold, bronchitis or symptoms of other infection? No   6. Do you have a cough, shortness of breath, or wheezing? No   7. Do you or anyone in your family have previous history of blood clots? No   8. Do you or does anyone in your family have a serious bleeding problem such as prolonged bleeding following surgeries or cuts? YES - mother and grandson- factor 9   9. Have you ever had problems with anemia or been told to take iron pills? No   10. Have you had any abnormal blood loss such as black, tarry or bloody stools, or abnormal vaginal bleeding? No   11. Have you ever had a blood transfusion? No   12. Are you willing to have a blood transfusion if it is medically needed before, during, or after your surgery? Yes   13. Have you or any of your relatives ever had problems with anesthesia? No   14. Do you have sleep apnea, excessive snoring or daytime drowsiness? No   15. Do you have any artifical heart valves or other implanted medical devices like a pacemaker, defibrillator, or continuous glucose monitor? No   16. Do you have artificial joints? No   17. Are you allergic to latex? No       Health Care Directive:  Patient does not have a Health Care Directive or Living Will: Discussed advance care planning with patient; however, patient declined at this time.    Preoperative Review of :   reviewed - no record of controlled substances prescribed.      Status of Chronic Conditions:  HYPERLIPIDEMIA - Patient has a long history of significant Hyperlipidemia requiring medication for treatment with recent good control. Patient reports no problems or side effects with the medication.     Tobacco dependence - 30 to 40 pack year history.      Review of Systems  Constitutional, neuro, ENT, endocrine, pulmonary, cardiac, gastrointestinal, genitourinary, musculoskeletal, integument and psychiatric systems are negative, except as otherwise noted.    Patient Active Problem List    Diagnosis Date Noted    Pulmonary nodules 06/30/2023     Priority: Medium     6/30/2023 - right middle lobe      Mixed hyperlipidemia 06/15/2023     Priority: Medium    Tobacco dependence (50 years, 0.75ppd) 05/15/2023     Priority: Medium    Abdominal cramping 05/15/2023     Priority: Medium    Loose stools 05/15/2023     Priority: Medium    Elevated blood pressure reading 05/15/2023     Priority: Medium    S/P total hysterectomy 05/15/2023     Priority: Low      Past Medical History:   Diagnosis Date    Closed 3-part fracture of proximal end of left humerus with malunion 10/05/2018    Hypertension 5-    Shoulder stiffness, left 12/20/2018     Past Surgical History:   Procedure Laterality Date    BREAST LUMPECTOMY, RT/LT Left 05/04/1999    Biopsy results Bartholin cyst.     COLONOSCOPY N/A 06/22/2023    rpt 2 years; Procedure: COLONOSCOPY with polypectomy;  Surgeon: Ramez Corona MD;  Location: [facility]    HYSTERECTOMY TOTAL ABDOMINAL, BILATERAL SALPINGO-OOPHORECTOMY, COMBINED Bilateral 05/04/1999    Performed at [facility] by Dr. LAURA Michael for fibroid uterus.     TOE SURGERY Bilateral     end of toes cut off from bone infection. had chronic ingrown nails.     Current Outpatient Medications   Medication Sig Dispense Refill    Calcium Carbonate (CALCIUM 600 PO) Take 1 tablet by mouth daily      Cyanocobalamin 5000 MCG SUBL Place 1 tablet under the tongue daily      diphenhydrAMINE-APAP, sleep, (TYLENOL PM EXTRA STRENGTH PO) Take 2 tablets by mouth daily      glucosamine-chondroitinoitin 750-600 MG TABS Take 1 tablet by mouth 2 times daily      Multiple Vitamins-Minerals (PRESERVISION AREDS PO) Take 1 tablet by mouth 2 times daily       "rosuvastatin (CRESTOR) 5 MG tablet Take 1 tablet (5 mg) by mouth daily 90 tablet 1    Vitamin D3 (CHOLECALCIFEROL) 125 MCG (5000 UT) tablet Take 1 tablet by mouth 2 times daily         No Known Allergies     Social History     Tobacco Use    Smoking status: Every Day     Packs/day: 0.75     Years: 50.00     Pack years: 37.50     Types: Cigarettes     Start date: 1973    Smokeless tobacco: Never    Tobacco comments:     50 years   Substance Use Topics    Alcohol use: No     Family History   Problem Relation Age of Onset    Breast Cancer Mother     Thyroid Disease Mother     Lung Cancer Father         mesothelioma    Hashimoto's thyroiditis Sister     Heart Disease Brother 72    Aneurysm Maternal Grandmother     Bone Cancer Maternal Grandfather      History   Drug Use No         Objective     /82 (BP Location: Left arm, Patient Position: Chair, Cuff Size: Adult Regular)   Pulse 96   Temp 98  F (36.7  C) (Tympanic)   Ht 1.753 m (5' 9\")   Wt 94.3 kg (208 lb)   SpO2 94%   BMI 30.72 kg/m      Physical Exam    GENERAL APPEARANCE: healthy, alert and no distress     EYES: EOMI, PERRL     HENT: ear canals normal; left TM normal, right with some erythema along malleus but no fluid; and nose and mouth without ulcers or lesions     NECK: no adenopathy, no asymmetry, masses, or scars and thyroid normal to palpation     RESP: normal work of breathing; some coarse sounding lungs in lower fields bilaterally, otherwise good air entry and no rhonchi or wheezing     CV: regular rates and rhythm, normal S1 S2, no S3 or S4 and no murmur, click or rub; no peripheral edema     ABDOMEN:  soft, nontender, no HSM or masses and bowel sounds normal     MS: extremities normal- no gross deformities noted, no evidence of inflammation in joints, FROM in all extremities.     SKIN: no suspicious lesions or rashes     NEURO: Normal strength and tone, sensory exam grossly normal, mentation intact and speech normal     PSYCH: mentation " appears normal. and affect normal/bright     LYMPHATICS: No cervical adenopathy    Recent Labs   Lab Test 06/15/23  1015 06/15/23  1012 05/15/23  1107   HGB  --  14.6 15.2   PLT  --   --  276   INR 0.94  --   --    NA  --   --  138   POTASSIUM  --   --  4.3   CR  --   --  0.80   A1C  --   --  5.4        Diagnostics:  Recent Results (from the past 24 hour(s))   Comprehensive metabolic panel (BMP + Alb, Alk Phos, ALT, AST, Total. Bili, TP)    Collection Time: 08/16/23  8:38 AM   Result Value Ref Range    Sodium 141 136 - 145 mmol/L    Potassium 4.1 3.4 - 5.3 mmol/L    Chloride 104 98 - 107 mmol/L    Carbon Dioxide (CO2) 26 22 - 29 mmol/L    Anion Gap 11 7 - 15 mmol/L    Urea Nitrogen 17.2 8.0 - 23.0 mg/dL    Creatinine 0.76 0.51 - 0.95 mg/dL    Calcium 9.2 8.8 - 10.2 mg/dL    Glucose 100 (H) 70 - 99 mg/dL    Alkaline Phosphatase 116 (H) 35 - 104 U/L    AST 18 0 - 45 U/L    ALT 19 0 - 50 U/L    Protein Total 6.6 6.4 - 8.3 g/dL    Albumin 4.2 3.5 - 5.2 g/dL    Bilirubin Total 0.2 <=1.2 mg/dL    GFR Estimate 84 >60 mL/min/1.73m2   Lipid Profile (Chol, Trig, HDL, LDL calc)    Collection Time: 08/16/23  8:38 AM   Result Value Ref Range    Cholesterol 181 <200 mg/dL    Triglycerides 152 (H) <150 mg/dL    Direct Measure HDL 59 >=50 mg/dL    LDL Cholesterol Calculated 92 <=100 mg/dL    Non HDL Cholesterol 122 <130 mg/dL   CBC with platelets and differential    Collection Time: 08/16/23  8:38 AM   Result Value Ref Range    WBC Count 10.3 4.0 - 11.0 10e3/uL    RBC Count 4.63 3.80 - 5.20 10e6/uL    Hemoglobin 14.2 11.7 - 15.7 g/dL    Hematocrit 43.5 35.0 - 47.0 %    MCV 94 78 - 100 fL    MCH 30.7 26.5 - 33.0 pg    MCHC 32.6 31.5 - 36.5 g/dL    RDW 13.4 10.0 - 15.0 %    Platelet Count 252 150 - 450 10e3/uL    % Neutrophils 74 %    % Lymphocytes 16 %    % Monocytes 8 %    % Eosinophils 1 %    % Basophils 1 %    % Immature Granulocytes 0 %    NRBCs per 100 WBC 0 <1 /100    Absolute Neutrophils 7.6 1.6 - 8.3 10e3/uL    Absolute  Lymphocytes 1.6 0.8 - 5.3 10e3/uL    Absolute Monocytes 0.8 0.0 - 1.3 10e3/uL    Absolute Eosinophils 0.1 0.0 - 0.7 10e3/uL    Absolute Basophils 0.1 0.0 - 0.2 10e3/uL    Absolute Immature Granulocytes 0.0 <=0.4 10e3/uL    Absolute NRBCs 0.0 10e3/uL      No EKG this visit, completed in the last 90 days. Last EKG was normal sinus rhythm with no ischemic findings.     Revised Cardiac Risk Index (RCRI):  The patient has the following serious cardiovascular risks for perioperative complications:   - No serious cardiac risks = 0 points     RCRI Interpretation: 0 points: Class I (very low risk - 0.4% complication rate)         Signed Electronically by: Melissa Merritt MD  Copy of this evaluation report is provided to requesting physician.

## 2023-08-15 NOTE — PROGRESS NOTES
[facility] [place]  [address]  [place] [address]  Phone: [phone]  Primary Provider: Melissa Burr  Pre-op Performing Provider: MELISSA BURR    PREOPERATIVE EVALUATION:  Today's date: 8/16/2023    Delores Mac is a 68 year old female who presents for a preoperative evaluation.    Surgical Information:  Surgery/Procedure: Laparoscopic Cholecystectomy   [place] [facility] [place]: Dr. Corona   Surgery Date: 8/21/2023  Time of Surgery: unknown   Where patient plans to recover: At home with family  Fax number for surgical facility: Note does not need to be faxed, will be available electronically in Epic.    Assessment & Plan     The proposed surgical procedure is considered INTERMEDIATE risk.    Preop general physical exam  Optimized for procedure.   - CBC with Platelets & Differential; Future  - CBC with Platelets & Differential    Calculus of gallbladder without cholecystitis without obstruction  Reason for surgery.     Mixed hyperlipidemia  Stable. Updating lipids and LFTs after 3 months on Crestor today. Will continue Crestor.   The 10-year ASCVD risk score (Theresa DK, et al., 2019) is: 13.7%    Values used to calculate the score:      Age: 68 years      Sex: Female      Is Non- : No      Diabetic: No      Tobacco smoker: Yes      Systolic Blood Pressure: 130 mmHg      Is BP treated: No      HDL Cholesterol: 59 mg/dL      Total Cholesterol: 181 mg/dL    - rosuvastatin (CRESTOR) 5 MG tablet; Take 1 tablet (5 mg) by mouth daily  - Comprehensive metabolic panel (BMP + Alb, Alk Phos, ALT, AST, Total. Bili, TP); Future  - Lipid Profile (Chol, Trig, HDL, LDL calc); Future  - Comprehensive metabolic panel (BMP + Alb, Alk Phos, ALT, AST, Total. Bili, TP)  - Lipid Profile (Chol, Trig, HDL, LDL calc)    Tobacco dependence (50 years, 0.75ppd)  Noted.     Pulmonary nodules  Right middle lobe benign appearing nodule noted on low dose lung cancer CT 6/30/23.      Abnormal lung sounds  Slight crackles appreciated in bases but no symptoms of orthopnea, PND or edema. Did have reassuring CT chest for lung cancer screening 6/30/23. CXR done today to evaluate for any effusion - no effusion or concerning acute findings. Some emphysematous blebs. No contraindications to surgery.   Recommend, with her chronic cough, we get PFTs. Suspect chronic bronchitis. Strongly encouraged smoking cessation. She was agreeable.   - XR CHEST 2 VW (Clinic Performed); Future       - No identified additional risk factors other than previously addressed    Antiplatelet or Anticoagulation Medication Instructions:   - Patient is on no antiplatelet or anticoagulation medications.    Additional Medication Instructions:  Patient is to take all scheduled medications on the day of surgery EXCEPT for modifications listed below:  Vitamins and supplements    RECOMMENDATION:  APPROVAL GIVEN to proceed with proposed procedure, without further diagnostic evaluation.      Subjective     HPI related to upcoming procedure: patient has a long history of fatty food intolerance. Alk phos was noted to be elevated. Ultrasound of gallbladder revealed a stone in the neck of the gallbladder with others in the gallbladder. Planning cholecystectomy with Dr. Corona.     Has been feeling well with no fevers, congestion, cough, or other URI symptoms.   No ischemic cardiac history. Denies any shortness of breath, chest pain, or dyspnea on exertion.   Is able to function at >4 METS.   Has had prior surgeries with general anesthesia and did well.     She does have 3+ months of infrequent, phlegm like cough. No fevers, chills, weight loss or night sweats. No new dyspnea or hemoptysis. Does have long smoking history. Had CT chest for lung cancer screening 6/30/23 that was reassuring, with a pulmonary nodule to monitor.         8/10/2023     2:30 PM   Preop Questions   1. Have you ever had a heart attack or stroke? No   2. Have you  ever had surgery on your heart or blood vessels, such as a stent placement, a coronary artery bypass, or surgery on an artery in your head, neck, heart, or legs? No   3. Do you have chest pain with activity? No   4. Do you have a history of  heart failure? No   5. Do you currently have a cold, bronchitis or symptoms of other infection? No   6. Do you have a cough, shortness of breath, or wheezing? No   7. Do you or anyone in your family have previous history of blood clots? No   8. Do you or does anyone in your family have a serious bleeding problem such as prolonged bleeding following surgeries or cuts? YES - mother and grandson- factor 9   9. Have you ever had problems with anemia or been told to take iron pills? No   10. Have you had any abnormal blood loss such as black, tarry or bloody stools, or abnormal vaginal bleeding? No   11. Have you ever had a blood transfusion? No   12. Are you willing to have a blood transfusion if it is medically needed before, during, or after your surgery? Yes   13. Have you or any of your relatives ever had problems with anesthesia? No   14. Do you have sleep apnea, excessive snoring or daytime drowsiness? No   15. Do you have any artifical heart valves or other implanted medical devices like a pacemaker, defibrillator, or continuous glucose monitor? No   16. Do you have artificial joints? No   17. Are you allergic to latex? No       Health Care Directive:  Patient does not have a Health Care Directive or Living Will: Discussed advance care planning with patient; however, patient declined at this time.    Preoperative Review of :   reviewed - no record of controlled substances prescribed.      Status of Chronic Conditions:  HYPERLIPIDEMIA - Patient has a long history of significant Hyperlipidemia requiring medication for treatment with recent good control. Patient reports no problems or side effects with the medication.     Tobacco dependence - 30 to 40 pack year history.      Review of Systems  Constitutional, neuro, ENT, endocrine, pulmonary, cardiac, gastrointestinal, genitourinary, musculoskeletal, integument and psychiatric systems are negative, except as otherwise noted.    Patient Active Problem List    Diagnosis Date Noted    Pulmonary nodules 06/30/2023     Priority: Medium     6/30/2023 - right middle lobe      Mixed hyperlipidemia 06/15/2023     Priority: Medium    Tobacco dependence (50 years, 0.75ppd) 05/15/2023     Priority: Medium    Abdominal cramping 05/15/2023     Priority: Medium    Loose stools 05/15/2023     Priority: Medium    Elevated blood pressure reading 05/15/2023     Priority: Medium    S/P total hysterectomy 05/15/2023     Priority: Low      Past Medical History:   Diagnosis Date    Closed 3-part fracture of proximal end of left humerus with malunion 10/05/2018    Hypertension 5-    Shoulder stiffness, left 12/20/2018     Past Surgical History:   Procedure Laterality Date    BREAST LUMPECTOMY, RT/LT Left 05/04/1999    Biopsy results Bartholin cyst.     COLONOSCOPY N/A 06/22/2023    rpt 2 years; Procedure: COLONOSCOPY with polypectomy;  Surgeon: Ramez Corona MD;  Location: [facility] OR    HYSTERECTOMY TOTAL ABDOMINAL, BILATERAL SALPINGO-OOPHORECTOMY, COMBINED Bilateral 05/04/1999    Performed at [facility] by Dr. LAURA Michael for fibroid uterus.     TOE SURGERY Bilateral     end of toes cut off from bone infection. had chronic ingrown nails.     Current Outpatient Medications   Medication Sig Dispense Refill    Calcium Carbonate (CALCIUM 600 PO) Take 1 tablet by mouth daily      Cyanocobalamin 5000 MCG SUBL Place 1 tablet under the tongue daily      diphenhydrAMINE-APAP, sleep, (TYLENOL PM EXTRA STRENGTH PO) Take 2 tablets by mouth daily      glucosamine-chondroitinoitin 750-600 MG TABS Take 1 tablet by mouth 2 times daily      Multiple Vitamins-Minerals (PRESERVISION AREDS PO) Take 1 tablet by mouth 2 times daily       "rosuvastatin (CRESTOR) 5 MG tablet Take 1 tablet (5 mg) by mouth daily 90 tablet 1    Vitamin D3 (CHOLECALCIFEROL) 125 MCG (5000 UT) tablet Take 1 tablet by mouth 2 times daily         No Known Allergies     Social History     Tobacco Use    Smoking status: Every Day     Packs/day: 0.75     Years: 50.00     Pack years: 37.50     Types: Cigarettes     Start date: 1973    Smokeless tobacco: Never    Tobacco comments:     50 years   Substance Use Topics    Alcohol use: No     Family History   Problem Relation Age of Onset    Breast Cancer Mother     Thyroid Disease Mother     Lung Cancer Father         mesothelioma    Hashimoto's thyroiditis Sister     Heart Disease Brother 72    Aneurysm Maternal Grandmother     Bone Cancer Maternal Grandfather      History   Drug Use No         Objective     /82 (BP Location: Left arm, Patient Position: Chair, Cuff Size: Adult Regular)   Pulse 96   Temp 98  F (36.7  C) (Tympanic)   Ht 1.753 m (5' 9\")   Wt 94.3 kg (208 lb)   SpO2 94%   BMI 30.72 kg/m      Physical Exam    GENERAL APPEARANCE: healthy, alert and no distress     EYES: EOMI, PERRL     HENT: ear canals normal; left TM normal, right with some erythema along malleus but no fluid; and nose and mouth without ulcers or lesions     NECK: no adenopathy, no asymmetry, masses, or scars and thyroid normal to palpation     RESP: normal work of breathing; some coarse sounding lungs in lower fields bilaterally, otherwise good air entry and no rhonchi or wheezing     CV: regular rates and rhythm, normal S1 S2, no S3 or S4 and no murmur, click or rub; no peripheral edema     ABDOMEN:  soft, nontender, no HSM or masses and bowel sounds normal     MS: extremities normal- no gross deformities noted, no evidence of inflammation in joints, FROM in all extremities.     SKIN: no suspicious lesions or rashes     NEURO: Normal strength and tone, sensory exam grossly normal, mentation intact and speech normal     PSYCH: mentation " appears normal. and affect normal/bright     LYMPHATICS: No cervical adenopathy    Recent Labs   Lab Test 06/15/23  1015 06/15/23  1012 05/15/23  1107   HGB  --  14.6 15.2   PLT  --   --  276   INR 0.94  --   --    NA  --   --  138   POTASSIUM  --   --  4.3   CR  --   --  0.80   A1C  --   --  5.4        Diagnostics:  Recent Results (from the past 24 hour(s))   Comprehensive metabolic panel (BMP + Alb, Alk Phos, ALT, AST, Total. Bili, TP)    Collection Time: 08/16/23  8:38 AM   Result Value Ref Range    Sodium 141 136 - 145 mmol/L    Potassium 4.1 3.4 - 5.3 mmol/L    Chloride 104 98 - 107 mmol/L    Carbon Dioxide (CO2) 26 22 - 29 mmol/L    Anion Gap 11 7 - 15 mmol/L    Urea Nitrogen 17.2 8.0 - 23.0 mg/dL    Creatinine 0.76 0.51 - 0.95 mg/dL    Calcium 9.2 8.8 - 10.2 mg/dL    Glucose 100 (H) 70 - 99 mg/dL    Alkaline Phosphatase 116 (H) 35 - 104 U/L    AST 18 0 - 45 U/L    ALT 19 0 - 50 U/L    Protein Total 6.6 6.4 - 8.3 g/dL    Albumin 4.2 3.5 - 5.2 g/dL    Bilirubin Total 0.2 <=1.2 mg/dL    GFR Estimate 84 >60 mL/min/1.73m2   Lipid Profile (Chol, Trig, HDL, LDL calc)    Collection Time: 08/16/23  8:38 AM   Result Value Ref Range    Cholesterol 181 <200 mg/dL    Triglycerides 152 (H) <150 mg/dL    Direct Measure HDL 59 >=50 mg/dL    LDL Cholesterol Calculated 92 <=100 mg/dL    Non HDL Cholesterol 122 <130 mg/dL   CBC with platelets and differential    Collection Time: 08/16/23  8:38 AM   Result Value Ref Range    WBC Count 10.3 4.0 - 11.0 10e3/uL    RBC Count 4.63 3.80 - 5.20 10e6/uL    Hemoglobin 14.2 11.7 - 15.7 g/dL    Hematocrit 43.5 35.0 - 47.0 %    MCV 94 78 - 100 fL    MCH 30.7 26.5 - 33.0 pg    MCHC 32.6 31.5 - 36.5 g/dL    RDW 13.4 10.0 - 15.0 %    Platelet Count 252 150 - 450 10e3/uL    % Neutrophils 74 %    % Lymphocytes 16 %    % Monocytes 8 %    % Eosinophils 1 %    % Basophils 1 %    % Immature Granulocytes 0 %    NRBCs per 100 WBC 0 <1 /100    Absolute Neutrophils 7.6 1.6 - 8.3 10e3/uL    Absolute  Lymphocytes 1.6 0.8 - 5.3 10e3/uL    Absolute Monocytes 0.8 0.0 - 1.3 10e3/uL    Absolute Eosinophils 0.1 0.0 - 0.7 10e3/uL    Absolute Basophils 0.1 0.0 - 0.2 10e3/uL    Absolute Immature Granulocytes 0.0 <=0.4 10e3/uL    Absolute NRBCs 0.0 10e3/uL      No EKG this visit, completed in the last 90 days. Last EKG was normal sinus rhythm with no ischemic findings.     Revised Cardiac Risk Index (RCRI):  The patient has the following serious cardiovascular risks for perioperative complications:   - No serious cardiac risks = 0 points     RCRI Interpretation: 0 points: Class I (very low risk - 0.4% complication rate)         Signed Electronically by: Melissa Merritt MD  Copy of this evaluation report is provided to requesting physician.

## 2023-08-16 ENCOUNTER — OFFICE VISIT (OUTPATIENT)
Dept: FAMILY MEDICINE | Facility: OTHER | Age: 68
End: 2023-08-16
Attending: STUDENT IN AN ORGANIZED HEALTH CARE EDUCATION/TRAINING PROGRAM
Payer: COMMERCIAL

## 2023-08-16 ENCOUNTER — ANCILLARY PROCEDURE (OUTPATIENT)
Dept: GENERAL RADIOLOGY | Facility: OTHER | Age: 68
End: 2023-08-16
Attending: STUDENT IN AN ORGANIZED HEALTH CARE EDUCATION/TRAINING PROGRAM
Payer: MEDICARE

## 2023-08-16 VITALS
SYSTOLIC BLOOD PRESSURE: 130 MMHG | HEIGHT: 69 IN | OXYGEN SATURATION: 94 % | WEIGHT: 208 LBS | HEART RATE: 96 BPM | BODY MASS INDEX: 30.81 KG/M2 | TEMPERATURE: 98 F | DIASTOLIC BLOOD PRESSURE: 82 MMHG

## 2023-08-16 DIAGNOSIS — R09.89 ABNORMAL LUNG SOUNDS: ICD-10-CM

## 2023-08-16 DIAGNOSIS — K80.20 CALCULUS OF GALLBLADDER WITHOUT CHOLECYSTITIS WITHOUT OBSTRUCTION: ICD-10-CM

## 2023-08-16 DIAGNOSIS — Z01.818 PREOP GENERAL PHYSICAL EXAM: Primary | ICD-10-CM

## 2023-08-16 DIAGNOSIS — R91.8 PULMONARY NODULES: Chronic | ICD-10-CM

## 2023-08-16 DIAGNOSIS — E78.2 MIXED HYPERLIPIDEMIA: Chronic | ICD-10-CM

## 2023-08-16 DIAGNOSIS — F17.200 TOBACCO DEPENDENCE: Chronic | ICD-10-CM

## 2023-08-16 LAB
ALBUMIN SERPL BCG-MCNC: 4.2 G/DL (ref 3.5–5.2)
ALP SERPL-CCNC: 116 U/L (ref 35–104)
ALT SERPL W P-5'-P-CCNC: 19 U/L (ref 0–50)
ANION GAP SERPL CALCULATED.3IONS-SCNC: 11 MMOL/L (ref 7–15)
AST SERPL W P-5'-P-CCNC: 18 U/L (ref 0–45)
BASOPHILS # BLD AUTO: 0.1 10E3/UL (ref 0–0.2)
BASOPHILS NFR BLD AUTO: 1 %
BILIRUB SERPL-MCNC: 0.2 MG/DL
BUN SERPL-MCNC: 17.2 MG/DL (ref 8–23)
CALCIUM SERPL-MCNC: 9.2 MG/DL (ref 8.8–10.2)
CHLORIDE SERPL-SCNC: 104 MMOL/L (ref 98–107)
CHOLEST SERPL-MCNC: 181 MG/DL
CREAT SERPL-MCNC: 0.76 MG/DL (ref 0.51–0.95)
DEPRECATED HCO3 PLAS-SCNC: 26 MMOL/L (ref 22–29)
EOSINOPHIL # BLD AUTO: 0.1 10E3/UL (ref 0–0.7)
EOSINOPHIL NFR BLD AUTO: 1 %
ERYTHROCYTE [DISTWIDTH] IN BLOOD BY AUTOMATED COUNT: 13.4 % (ref 10–15)
GFR SERPL CREATININE-BSD FRML MDRD: 84 ML/MIN/1.73M2
GLUCOSE SERPL-MCNC: 100 MG/DL (ref 70–99)
HCT VFR BLD AUTO: 43.5 % (ref 35–47)
HDLC SERPL-MCNC: 59 MG/DL
HGB BLD-MCNC: 14.2 G/DL (ref 11.7–15.7)
IMM GRANULOCYTES # BLD: 0 10E3/UL
IMM GRANULOCYTES NFR BLD: 0 %
LDLC SERPL CALC-MCNC: 92 MG/DL
LYMPHOCYTES # BLD AUTO: 1.6 10E3/UL (ref 0.8–5.3)
LYMPHOCYTES NFR BLD AUTO: 16 %
MCH RBC QN AUTO: 30.7 PG (ref 26.5–33)
MCHC RBC AUTO-ENTMCNC: 32.6 G/DL (ref 31.5–36.5)
MCV RBC AUTO: 94 FL (ref 78–100)
MONOCYTES # BLD AUTO: 0.8 10E3/UL (ref 0–1.3)
MONOCYTES NFR BLD AUTO: 8 %
NEUTROPHILS # BLD AUTO: 7.6 10E3/UL (ref 1.6–8.3)
NEUTROPHILS NFR BLD AUTO: 74 %
NONHDLC SERPL-MCNC: 122 MG/DL
NRBC # BLD AUTO: 0 10E3/UL
NRBC BLD AUTO-RTO: 0 /100
PLATELET # BLD AUTO: 252 10E3/UL (ref 150–450)
POTASSIUM SERPL-SCNC: 4.1 MMOL/L (ref 3.4–5.3)
PROT SERPL-MCNC: 6.6 G/DL (ref 6.4–8.3)
RBC # BLD AUTO: 4.63 10E6/UL (ref 3.8–5.2)
SODIUM SERPL-SCNC: 141 MMOL/L (ref 136–145)
TRIGL SERPL-MCNC: 152 MG/DL
WBC # BLD AUTO: 10.3 10E3/UL (ref 4–11)

## 2023-08-16 PROCEDURE — 71046 X-RAY EXAM CHEST 2 VIEWS: CPT | Mod: TC

## 2023-08-16 PROCEDURE — 85004 AUTOMATED DIFF WBC COUNT: CPT | Mod: ZL | Performed by: STUDENT IN AN ORGANIZED HEALTH CARE EDUCATION/TRAINING PROGRAM

## 2023-08-16 PROCEDURE — 80061 LIPID PANEL: CPT | Mod: ZL | Performed by: STUDENT IN AN ORGANIZED HEALTH CARE EDUCATION/TRAINING PROGRAM

## 2023-08-16 PROCEDURE — G0463 HOSPITAL OUTPT CLINIC VISIT: HCPCS

## 2023-08-16 PROCEDURE — 36415 COLL VENOUS BLD VENIPUNCTURE: CPT | Mod: ZL | Performed by: STUDENT IN AN ORGANIZED HEALTH CARE EDUCATION/TRAINING PROGRAM

## 2023-08-16 PROCEDURE — 80053 COMPREHEN METABOLIC PANEL: CPT | Mod: ZL | Performed by: STUDENT IN AN ORGANIZED HEALTH CARE EDUCATION/TRAINING PROGRAM

## 2023-08-16 PROCEDURE — 99214 OFFICE O/P EST MOD 30 MIN: CPT | Performed by: STUDENT IN AN ORGANIZED HEALTH CARE EDUCATION/TRAINING PROGRAM

## 2023-08-16 RX ORDER — ROSUVASTATIN CALCIUM 5 MG/1
5 TABLET, COATED ORAL DAILY
Qty: 90 TABLET | Refills: 1 | Status: SHIPPED | OUTPATIENT
Start: 2023-08-16 | End: 2024-02-14

## 2023-08-16 ASSESSMENT — PAIN SCALES - GENERAL: PAINLEVEL: NO PAIN (0)

## 2023-08-16 NOTE — PATIENT INSTRUCTIONS
Before Your Surgery     Call your surgeon if there is any change in your health. This includes signs of a cold or flu (such as a sore throat, runny nose, cough, rash, fever, urinary symptoms).   If you take prescribed drugs: Follow your doctor s orders about which medicines to take and which to stop until after surgery.  HOLD supplements one week prior  Okay to continue Crestor  Do not smoke, drink alcohol or take over the counter medicine (unless your surgeon or primary care doctor tells you to) for the 24 hours before and after surgery. Smoking can increase your risk of an infection. Nicotine patches are safe to use. NSAIDS like ibuprofen (Advil, Motrin) should be held one day before surgery. Celebrex should be held 3 days before surgery. Naproxen (Aleve) should be held four days before surgery.   Do not take supplements/vitamins day prior and morning of surgery. Some supplements/vitamins can interfere with anesthesia.   Eating and drinking prior to surgery: follow the instructions from your surgeon  Take a shower or bath the night before surgery and morning of surgery. Use the soap your surgeon gave you to gently clean your skin night before and morning of surgery. If you do not have soap from your surgeon, use your regular soap.[facility] patients can receive free Chlorhexidine Gluconate 4% soap for surgery at an outpatient [facility] pharmacy.  Do not shave the surgery site.  Wear clean pajamas and have clean sheets on your bed.  Brush teeth morning of surgery to reduce risk of infection.

## 2023-08-17 ENCOUNTER — ANESTHESIA EVENT (OUTPATIENT)
Dept: SURGERY | Facility: HOSPITAL | Age: 68
End: 2023-08-17
Payer: MEDICARE

## 2023-08-17 ASSESSMENT — LIFESTYLE VARIABLES: TOBACCO_USE: 1

## 2023-08-17 NOTE — ANESTHESIA PREPROCEDURE EVALUATION
Anesthesia Pre-Procedure Evaluation    Patient: Delores Mac   MRN: [id number] : 1955        Procedure : Procedure(s):  Laparoscopic Cholecystectomy          Past Medical History:   Diagnosis Date     Closed 3-part fracture of proximal end of left humerus with malunion 10/05/2018     Hypertension 5-     Shoulder stiffness, left 2018      Past Surgical History:   Procedure Laterality Date     BREAST LUMPECTOMY, RT/LT Left 1999    Biopsy results Bartholin cyst.      COLONOSCOPY N/A 2023    rpt 2 years; Procedure: COLONOSCOPY with polypectomy;  Surgeon: Ramez Corona MD;  Location: [facility] OR     HYSTERECTOMY TOTAL ABDOMINAL, BILATERAL SALPINGO-OOPHORECTOMY, COMBINED Bilateral 1999    Performed at [facility] by Dr. LAURA Michael for fibroid uterus.      TOE SURGERY Bilateral     end of toes cut off from bone infection. had chronic ingrown nails.      No Known Allergies   Social History     Tobacco Use     Smoking status: Every Day     Packs/day: 0.75     Years: 50.00     Pack years: 37.50     Types: Cigarettes     Start date:      Smokeless tobacco: Never     Tobacco comments:     50 years   Substance Use Topics     Alcohol use: No      Wt Readings from Last 1 Encounters:   23 94.3 kg (208 lb)        Anesthesia Evaluation   Pt has had prior anesthetic.     No history of anesthetic complications       ROS/MED HX  ENT/Pulmonary: Comment:   Crackles in bases per HP  CXR negative emphysematous blebs noted  Pulmonary nodules    (+)                tobacco use, Current use,                      Neurologic:  - neg neurologic ROS     Cardiovascular:     (+) Dyslipidemia hypertension- -   -  - -                                 Previous cardiac testing   Echo: Date: Results:    Stress Test:  Date: Results:    ECG Reviewed:  Date: 2023 Results:  SR  Cath:  Date: Results:      METS/Exercise Tolerance:     Hematologic:  - neg hematologic  ROS      Musculoskeletal:  - neg musculoskeletal ROS     GI/Hepatic:     (+)          cholecystitis/cholelithiasis,          Renal/Genitourinary:  - neg Renal ROS     Endo:     (+)               Obesity,       Psychiatric/Substance Use:  - neg psychiatric ROS     Infectious Disease:  - neg infectious disease ROS     Malignancy:  - neg malignancy ROS     Other:  - neg other ROS          Physical Exam    Airway        Mallampati: III   TM distance: > 3 FB   Neck ROM: full   Mouth opening: > 3 cm    Respiratory Devices and Support         Dental       (+) Multiple crowns, permanant bridges      Cardiovascular    unable to assess      Rhythm and rate: regular and normal     Pulmonary           breath sounds clear to auscultation   (+) decreased breath sounds       OUTSIDE LABS:  CBC:   Lab Results   Component Value Date    WBC 10.3 08/16/2023    WBC 11.3 (H) 05/15/2023    HGB 14.2 08/16/2023    HGB 14.6 06/15/2023    HCT 43.5 08/16/2023    HCT 45.6 05/15/2023     08/16/2023     05/15/2023     BMP:   Lab Results   Component Value Date     08/16/2023     05/15/2023    POTASSIUM 4.1 08/16/2023    POTASSIUM 4.3 05/15/2023    CHLORIDE 104 08/16/2023    CHLORIDE 101 05/15/2023    CO2 26 08/16/2023    CO2 23 05/15/2023    BUN 17.2 08/16/2023    BUN 17.2 05/15/2023    CR 0.76 08/16/2023    CR 0.80 05/15/2023     (H) 08/16/2023     (H) 05/15/2023     COAGS:   Lab Results   Component Value Date    PTT 29 06/15/2023    INR 0.94 06/15/2023     POC: No results found for: BGM, HCG, HCGS  HEPATIC:   Lab Results   Component Value Date    ALBUMIN 4.2 08/16/2023    PROTTOTAL 6.6 08/16/2023    ALT 19 08/16/2023    AST 18 08/16/2023    GGT 64 (H) 05/15/2023    ALKPHOS 116 (H) 08/16/2023    BILITOTAL 0.2 08/16/2023     OTHER:   Lab Results   Component Value Date    A1C 5.4 05/15/2023    CHIOMA 9.2 08/16/2023    TSH 1.87 05/15/2023       Anesthesia Plan    ASA Status:  3    NPO Status:  NPO Appropriate     Anesthesia Type: General.     - Airway: ETT   Induction: Intravenous.   Maintenance: Balanced.        Consents    Anesthesia Plan(s) and associated risks, benefits, and realistic alternatives discussed. Questions answered and patient/representative(s) expressed understanding.     - Discussed: Risks, Benefits and Alternatives for BOTH SEDATION and the PROCEDURE were discussed     - Discussed with:  Patient      - Extended Intubation/Ventilatory Support Discussed: No.      - Patient is DNR/DNI Status: No     Use of blood products discussed: No .     Postoperative Care            Comments:    Other Comments: [facility] 8/16/23              GRABIEL Simons CRNA

## 2023-08-21 ENCOUNTER — ANESTHESIA (OUTPATIENT)
Dept: SURGERY | Facility: HOSPITAL | Age: 68
End: 2023-08-21
Payer: MEDICARE

## 2023-08-21 ENCOUNTER — HOSPITAL ENCOUNTER (OUTPATIENT)
Facility: HOSPITAL | Age: 68
Discharge: HOME | End: 2023-08-21
Attending: SURGERY | Admitting: SURGERY
Payer: MEDICARE

## 2023-08-21 VITALS
DIASTOLIC BLOOD PRESSURE: 82 MMHG | HEART RATE: 83 BPM | RESPIRATION RATE: 16 BRPM | SYSTOLIC BLOOD PRESSURE: 153 MMHG | OXYGEN SATURATION: 94 % | HEIGHT: 69 IN | BODY MASS INDEX: 30.81 KG/M2 | WEIGHT: 208 LBS | TEMPERATURE: 97.9 F

## 2023-08-21 DIAGNOSIS — K81.9 CHOLECYSTITIS: Primary | ICD-10-CM

## 2023-08-21 PROCEDURE — 272N000001 HC OR GENERAL SUPPLY STERILE: Performed by: SURGERY

## 2023-08-21 PROCEDURE — 710N000010 HC RECOVERY PHASE 1, LEVEL 2, PER MIN: Performed by: SURGERY

## 2023-08-21 PROCEDURE — 250N000009 HC RX 250: Performed by: NURSE ANESTHETIST, CERTIFIED REGISTERED

## 2023-08-21 PROCEDURE — 88304 TISSUE EXAM BY PATHOLOGIST: CPT | Mod: TC | Performed by: SURGERY

## 2023-08-21 PROCEDURE — 710N000012 HC RECOVERY PHASE 2, PER MINUTE: Performed by: SURGERY

## 2023-08-21 PROCEDURE — 360N000076 HC SURGERY LEVEL 3, PER MIN: Performed by: SURGERY

## 2023-08-21 PROCEDURE — 47562 LAPAROSCOPIC CHOLECYSTECTOMY: CPT | Performed by: NURSE ANESTHETIST, CERTIFIED REGISTERED

## 2023-08-21 PROCEDURE — 258N000003 HC RX IP 258 OP 636: Performed by: NURSE ANESTHETIST, CERTIFIED REGISTERED

## 2023-08-21 PROCEDURE — 999N000141 HC STATISTIC PRE-PROCEDURE NURSING ASSESSMENT: Performed by: SURGERY

## 2023-08-21 PROCEDURE — 250N000011 HC RX IP 250 OP 636: Mod: JZ | Performed by: NURSE ANESTHETIST, CERTIFIED REGISTERED

## 2023-08-21 PROCEDURE — 250N000013 HC RX MED GY IP 250 OP 250 PS 637: Performed by: SURGERY

## 2023-08-21 PROCEDURE — 250N000025 HC SEVOFLURANE, PER MIN: Performed by: SURGERY

## 2023-08-21 PROCEDURE — 88304 TISSUE EXAM BY PATHOLOGIST: CPT | Mod: 26 | Performed by: PATHOLOGY

## 2023-08-21 PROCEDURE — 250N000011 HC RX IP 250 OP 636: Performed by: SURGERY

## 2023-08-21 PROCEDURE — 370N000017 HC ANESTHESIA TECHNICAL FEE, PER MIN: Performed by: SURGERY

## 2023-08-21 PROCEDURE — 47562 LAPAROSCOPIC CHOLECYSTECTOMY: CPT | Performed by: SURGERY

## 2023-08-21 PROCEDURE — 250N000011 HC RX IP 250 OP 636: Performed by: NURSE ANESTHETIST, CERTIFIED REGISTERED

## 2023-08-21 RX ORDER — ALBUTEROL SULFATE 0.83 MG/ML
2.5 SOLUTION RESPIRATORY (INHALATION) EVERY 4 HOURS PRN
Status: DISCONTINUED | OUTPATIENT
Start: 2023-08-21 | End: 2023-08-21 | Stop reason: HOSPADM

## 2023-08-21 RX ORDER — PROPOFOL 10 MG/ML
INJECTION, EMULSION INTRAVENOUS PRN
Status: DISCONTINUED | OUTPATIENT
Start: 2023-08-21 | End: 2023-08-21

## 2023-08-21 RX ORDER — LIDOCAINE HYDROCHLORIDE 20 MG/ML
INJECTION, SOLUTION INFILTRATION; PERINEURAL PRN
Status: DISCONTINUED | OUTPATIENT
Start: 2023-08-21 | End: 2023-08-21

## 2023-08-21 RX ORDER — ONDANSETRON 4 MG/1
4 TABLET, ORALLY DISINTEGRATING ORAL EVERY 30 MIN PRN
Status: DISCONTINUED | OUTPATIENT
Start: 2023-08-21 | End: 2023-08-21 | Stop reason: HOSPADM

## 2023-08-21 RX ORDER — SODIUM CHLORIDE, SODIUM LACTATE, POTASSIUM CHLORIDE, CALCIUM CHLORIDE 600; 310; 30; 20 MG/100ML; MG/100ML; MG/100ML; MG/100ML
INJECTION, SOLUTION INTRAVENOUS CONTINUOUS
Status: DISCONTINUED | OUTPATIENT
Start: 2023-08-21 | End: 2023-08-21 | Stop reason: HOSPADM

## 2023-08-21 RX ORDER — FENTANYL CITRATE 50 UG/ML
INJECTION, SOLUTION INTRAMUSCULAR; INTRAVENOUS PRN
Status: DISCONTINUED | OUTPATIENT
Start: 2023-08-21 | End: 2023-08-21

## 2023-08-21 RX ORDER — METRONIDAZOLE 500 MG/100ML
500 INJECTION, SOLUTION INTRAVENOUS
Status: COMPLETED | OUTPATIENT
Start: 2023-08-21 | End: 2023-08-21

## 2023-08-21 RX ORDER — HYDROMORPHONE HYDROCHLORIDE 1 MG/ML
0.2 INJECTION, SOLUTION INTRAMUSCULAR; INTRAVENOUS; SUBCUTANEOUS EVERY 5 MIN PRN
Status: DISCONTINUED | OUTPATIENT
Start: 2023-08-21 | End: 2023-08-21 | Stop reason: HOSPADM

## 2023-08-21 RX ORDER — ONDANSETRON 2 MG/ML
4 INJECTION INTRAMUSCULAR; INTRAVENOUS EVERY 30 MIN PRN
Status: DISCONTINUED | OUTPATIENT
Start: 2023-08-21 | End: 2023-08-21 | Stop reason: HOSPADM

## 2023-08-21 RX ORDER — DEXAMETHASONE SODIUM PHOSPHATE 4 MG/ML
INJECTION, SOLUTION INTRA-ARTICULAR; INTRALESIONAL; INTRAMUSCULAR; INTRAVENOUS; SOFT TISSUE PRN
Status: DISCONTINUED | OUTPATIENT
Start: 2023-08-21 | End: 2023-08-21

## 2023-08-21 RX ORDER — ONDANSETRON 2 MG/ML
INJECTION INTRAMUSCULAR; INTRAVENOUS PRN
Status: DISCONTINUED | OUTPATIENT
Start: 2023-08-21 | End: 2023-08-21

## 2023-08-21 RX ORDER — NALOXONE HYDROCHLORIDE 0.4 MG/ML
0.2 INJECTION, SOLUTION INTRAMUSCULAR; INTRAVENOUS; SUBCUTANEOUS
Status: DISCONTINUED | OUTPATIENT
Start: 2023-08-21 | End: 2023-08-21 | Stop reason: HOSPADM

## 2023-08-21 RX ORDER — LIDOCAINE 40 MG/G
CREAM TOPICAL
Status: DISCONTINUED | OUTPATIENT
Start: 2023-08-21 | End: 2023-08-21 | Stop reason: HOSPADM

## 2023-08-21 RX ORDER — CEFAZOLIN SODIUM/WATER 2 G/20 ML
2 SYRINGE (ML) INTRAVENOUS
Status: COMPLETED | OUTPATIENT
Start: 2023-08-21 | End: 2023-08-21

## 2023-08-21 RX ORDER — DEXMEDETOMIDINE HYDROCHLORIDE 4 UG/ML
INJECTION, SOLUTION INTRAVENOUS PRN
Status: DISCONTINUED | OUTPATIENT
Start: 2023-08-21 | End: 2023-08-21

## 2023-08-21 RX ORDER — FENTANYL CITRATE 50 UG/ML
50 INJECTION, SOLUTION INTRAMUSCULAR; INTRAVENOUS EVERY 5 MIN PRN
Status: DISCONTINUED | OUTPATIENT
Start: 2023-08-21 | End: 2023-08-21 | Stop reason: HOSPADM

## 2023-08-21 RX ORDER — NALOXONE HYDROCHLORIDE 0.4 MG/ML
0.4 INJECTION, SOLUTION INTRAMUSCULAR; INTRAVENOUS; SUBCUTANEOUS
Status: DISCONTINUED | OUTPATIENT
Start: 2023-08-21 | End: 2023-08-21 | Stop reason: HOSPADM

## 2023-08-21 RX ORDER — HYDROCODONE BITARTRATE AND ACETAMINOPHEN 5; 325 MG/1; MG/1
1 TABLET ORAL ONCE
Status: COMPLETED | OUTPATIENT
Start: 2023-08-21 | End: 2023-08-21

## 2023-08-21 RX ORDER — OXYCODONE HYDROCHLORIDE 5 MG/1
5 TABLET ORAL
Status: DISCONTINUED | OUTPATIENT
Start: 2023-08-21 | End: 2023-08-21 | Stop reason: HOSPADM

## 2023-08-21 RX ORDER — HYDROCODONE BITARTRATE AND ACETAMINOPHEN 5; 325 MG/1; MG/1
1 TABLET ORAL EVERY 6 HOURS PRN
Qty: 18 TABLET | Refills: 0 | Status: SHIPPED | OUTPATIENT
Start: 2023-08-21 | End: 2023-08-24

## 2023-08-21 RX ORDER — LABETALOL 20 MG/4 ML (5 MG/ML) INTRAVENOUS SYRINGE
10
Status: COMPLETED | OUTPATIENT
Start: 2023-08-21 | End: 2023-08-21

## 2023-08-21 RX ORDER — HYDRALAZINE HYDROCHLORIDE 20 MG/ML
2.5-5 INJECTION INTRAMUSCULAR; INTRAVENOUS EVERY 10 MIN PRN
Status: DISCONTINUED | OUTPATIENT
Start: 2023-08-21 | End: 2023-08-21 | Stop reason: HOSPADM

## 2023-08-21 RX ORDER — HYDROMORPHONE HYDROCHLORIDE 1 MG/ML
0.4 INJECTION, SOLUTION INTRAMUSCULAR; INTRAVENOUS; SUBCUTANEOUS EVERY 5 MIN PRN
Status: DISCONTINUED | OUTPATIENT
Start: 2023-08-21 | End: 2023-08-21 | Stop reason: HOSPADM

## 2023-08-21 RX ORDER — KETAMINE HYDROCHLORIDE 10 MG/ML
INJECTION INTRAMUSCULAR; INTRAVENOUS PRN
Status: DISCONTINUED | OUTPATIENT
Start: 2023-08-21 | End: 2023-08-21

## 2023-08-21 RX ORDER — BUPIVACAINE HYDROCHLORIDE 5 MG/ML
INJECTION, SOLUTION PERINEURAL PRN
Status: DISCONTINUED | OUTPATIENT
Start: 2023-08-21 | End: 2023-08-21 | Stop reason: HOSPADM

## 2023-08-21 RX ORDER — FENTANYL CITRATE 50 UG/ML
25 INJECTION, SOLUTION INTRAMUSCULAR; INTRAVENOUS EVERY 5 MIN PRN
Status: DISCONTINUED | OUTPATIENT
Start: 2023-08-21 | End: 2023-08-21 | Stop reason: HOSPADM

## 2023-08-21 RX ADMIN — METRONIDAZOLE 500 MG: 500 INJECTION, SOLUTION INTRAVENOUS at 07:28

## 2023-08-21 RX ADMIN — LIDOCAINE HYDROCHLORIDE 40 MG: 20 INJECTION, SOLUTION INFILTRATION; PERINEURAL at 07:40

## 2023-08-21 RX ADMIN — FENTANYL CITRATE 50 MCG: 50 INJECTION, SOLUTION INTRAMUSCULAR; INTRAVENOUS at 08:03

## 2023-08-21 RX ADMIN — ONDANSETRON 4 MG: 2 INJECTION INTRAMUSCULAR; INTRAVENOUS at 08:17

## 2023-08-21 RX ADMIN — MIDAZOLAM 2 MG: 1 INJECTION INTRAMUSCULAR; INTRAVENOUS at 07:28

## 2023-08-21 RX ADMIN — PROPOFOL 200 MG: 10 INJECTION, EMULSION INTRAVENOUS at 07:40

## 2023-08-21 RX ADMIN — DEXMEDETOMIDINE 4 MCG: 100 INJECTION, SOLUTION, CONCENTRATE INTRAVENOUS at 08:21

## 2023-08-21 RX ADMIN — LABETALOL HYDROCHLORIDE 10 MG: 5 INJECTION, SOLUTION INTRAVENOUS at 08:59

## 2023-08-21 RX ADMIN — Medication 100 MG: at 07:40

## 2023-08-21 RX ADMIN — FENTANYL CITRATE 25 MCG: 50 INJECTION, SOLUTION INTRAMUSCULAR; INTRAVENOUS at 09:06

## 2023-08-21 RX ADMIN — SODIUM CHLORIDE, POTASSIUM CHLORIDE, SODIUM LACTATE AND CALCIUM CHLORIDE: 600; 310; 30; 20 INJECTION, SOLUTION INTRAVENOUS at 06:59

## 2023-08-21 RX ADMIN — FENTANYL CITRATE 50 MCG: 50 INJECTION, SOLUTION INTRAMUSCULAR; INTRAVENOUS at 07:59

## 2023-08-21 RX ADMIN — Medication 30 MG: at 07:41

## 2023-08-21 RX ADMIN — FENTANYL CITRATE 100 MCG: 50 INJECTION, SOLUTION INTRAMUSCULAR; INTRAVENOUS at 07:35

## 2023-08-21 RX ADMIN — ROCURONIUM BROMIDE 40 MG: 10 INJECTION INTRAVENOUS at 07:44

## 2023-08-21 RX ADMIN — HYDROCODONE BITARTRATE AND ACETAMINOPHEN 1 TABLET: 5; 325 TABLET ORAL at 09:40

## 2023-08-21 RX ADMIN — Medication 2 G: at 07:23

## 2023-08-21 RX ADMIN — SUGAMMADEX 200 MG: 100 INJECTION, SOLUTION INTRAVENOUS at 08:26

## 2023-08-21 RX ADMIN — PHENYLEPHRINE HYDROCHLORIDE 100 MCG: 10 INJECTION INTRAVENOUS at 07:50

## 2023-08-21 RX ADMIN — DEXAMETHASONE SODIUM PHOSPHATE 4 MG: 4 INJECTION, SOLUTION INTRA-ARTICULAR; INTRALESIONAL; INTRAMUSCULAR; INTRAVENOUS; SOFT TISSUE at 08:16

## 2023-08-21 RX ADMIN — FENTANYL CITRATE 50 MCG: 50 INJECTION, SOLUTION INTRAMUSCULAR; INTRAVENOUS at 08:19

## 2023-08-21 ASSESSMENT — ACTIVITIES OF DAILY LIVING (ADL)
ADLS_ACUITY_SCORE: 35
ADLS_ACUITY_SCORE: 35

## 2023-08-21 NOTE — INTERVAL H&P NOTE
"I have reviewed the surgical (or preoperative) H&P that is linked to this encounter, and examined the patient. There are no significant changes    Clinical Conditions Present on Arrival:  Clinically Significant Risk Factors Present on Admission                  # Obesity: Estimated body mass index is 30.72 kg/m  as calculated from the following:    Height as of this encounter: 1.753 m (5' 9\").    Weight as of this encounter: 94.3 kg (208 lb).       "

## 2023-08-21 NOTE — ANESTHESIA PROCEDURE NOTES
Airway         Procedure Start/Stop Times: 8/21/2023 7:40 AM  Staff -        CRNA: Amber Davila APRN CRNA       Performed By: CRNA  Consent for Airway        Urgency: elective  Indications and Patient Condition       Indications for airway management: juana-procedural       Induction type:intravenous       Mask difficulty assessment: 1 - vent by mask    Final Airway Details       Final airway type: endotracheal airway       Successful airway: ETT - single  Endotracheal Airway Details        ETT size (mm): 7.0       Cuffed: yes       Successful intubation technique: direct laryngoscopy       DL Blade Type: Cortes 2       Grade View of Cords: 3 (patient anterior)       Adjucts: stylet       Position: Right       Measured from: lips       Secured at (cm): 21    Post intubation assessment        Placement verified by: capnometry, equal breath sounds and chest rise        Number of attempts at approach: 1       Secured with: commercial tube cope and plastic tape       Ease of procedure: easy       Dentition: Intact and Unchanged    Medication(s) Administered   Medication Administration Time: 8/21/2023 7:40 AM

## 2023-08-21 NOTE — ANESTHESIA CARE TRANSFER NOTE
Patient: Delores Mac    Procedure: Procedure(s):  Laparoscopic Cholecystectomy       Diagnosis: Cholelithiases [K80.20]  Diagnosis Additional Information: No value filed.    Anesthesia Type:   General     Note:    Oropharynx: oropharynx clear of all foreign objects and spontaneously breathing  Level of Consciousness: drowsy  Oxygen Supplementation: nasal cannula  Level of Supplemental Oxygen (L/min / FiO2): 2  Independent Airway: airway patency satisfactory and stable  Dentition: dentition unchanged  Vital Signs Stable: post-procedure vital signs reviewed and stable  Report to RN Given: handoff report given  Patient transferred to: PACU    Handoff Report: Identifed the Patient, Identified the Reponsible Provider, Reviewed the pertinent medical history, Discussed the surgical course, Reviewed Intra-OP anesthesia mangement and issues during anesthesia, Set expectations for post-procedure period and Allowed opportunity for questions and acknowledgement of understanding  Vitals:  Vitals Value Taken Time   /102 08/21/23 0840   Temp     Pulse 91 08/21/23 0840   Resp     SpO2 94 % 08/21/23 0843   Vitals shown include unvalidated device data.    Electronically Signed By: GRABIEL Simons CRNA  August 21, 2023  8:45 AM  
Addended by: Clearance Bingham on: 1/7/2020 12:21 PM     Modules accepted: Orders
Yes

## 2023-08-21 NOTE — OP NOTE
REPORT OF OPERATION  DATE OF PROCEDURE: 8/21/2023    PATIENT: Delores Mac    SURGERY PERFORMED: Laparoscopic Cholecystectomy    PREOPERATIVE DIAGNOSIS: Symptomoatic Cholelithiasis    POSTOPERATIVE DIAGNOSIS: Symptomoatic Cholelithiasis and Chronic Cholecystitis    SURGEON: Ramez Corona MD    ASSISTANTS: None    ANESTHESIA: General Endotracheal Anesthesia    COMPLICATIONS: None apparent    TRANSFUSIONS: None    TISSUE TO PATHOLOGY: Gallbladder and contents to Pathology for pathological diagnosis    FINDINGS: Cholelithiasis and Chronic Cholecystitis    INDICATIONS: This is a 68 year old female with Symptomoatic Cholelithiasis.  The patient will be taken to the operative suite for a laparoscopic possible open cholecystectomy.    DESCRIPTIONS OF PROCEDURE IN DETAIL: After consent was obtained the patient was taken to the operative suite and jose in the supine position.  The patient was identified and the correct patient was confirmed.  General endotracheal anesthesia was induced by anesthesia.  The patient was sterilely prepped and draped in the usual fashion.  A time out was performed verifying the correct patient and the correct procedure.  The entire operative team was in agreement.  All necessary equipment and supplies were in the room.    Through a supraumbilical incision, the skin was sharply entered and dissection was carried down until isolation of the fascia.  Via Mireille technique, two stay sutures of 0 Vicryl were placed and the fascia was incised.  Entrance into the abdomen was accomplished.  A 10mm blunt-ended trocar was then inserted into the abdomen and pneumoperitoneum was obtained.  The laparoscope was inserted through the trocar and verification of no intraabdominal trauma was made.  Under direct visualization two 5mm right lateral trocars and a 5mm subxiphoid trocar were placed with verification of no intraabdominal trauma.  The fundus of the gallbladder was then identified, isolated and  grasped with an atraumatic grasper and retracted cephalad.  Zarina's pouch was then identified, isolated and grasped with an atraumatic grasper and retracted laterally and inferiorly opening the Trangle of [place].  Dissection in the [place] of [place] yielded the cystic duct which was divided between clips.  The cystic artery was then identified and divided between clips.  The gallbladder was removed from the gallbladder fossa using Bovie electrocautery.   The gallbladder was removed from the abdomen and sent to Pathology for pathological diagnoses.  Hemostasis was assured.  All instrumentation was removed.  Sponge, needle and instrument counts were correct.  The supraumbilical trocars sites fascia was closed with a figure-of-eight of 0 Vicryl.  All skin incisions were closed with stainless steel staples.  Sterile dressings were applied.  The patient was awakened in the operative suite and extubated without difficulty and taken to the recovery room in stable condition tolerating the procedure well

## 2023-08-21 NOTE — OR NURSING
Patient and responsible adult given discharge instructions with no questions regarding instructions. Kunal score 19/20 medicated with po Norco. Pain level 4/10.  Discharged from unit via wheelchair. Patient discharged to home with spouse.

## 2023-08-21 NOTE — ANESTHESIA POSTPROCEDURE EVALUATION
Patient: Delores Mac    Procedure: Procedure(s):  Laparoscopic Cholecystectomy       Anesthesia Type:  General    Note:  Disposition: Outpatient   Postop Pain Control: Uneventful            Sign Out: Well controlled pain   PONV: No   Neuro/Psych: Uneventful            Sign Out: Acceptable/Baseline neuro status   Airway/Respiratory: Uneventful            Sign Out: Acceptable/Baseline resp. status   CV/Hemodynamics: Uneventful            Sign Out: Acceptable CV status; No obvious hypovolemia; No obvious fluid overload   Other NRE: NONE   DID A NON-ROUTINE EVENT OCCUR? No         Last vitals:  Vitals Value Taken Time   /67 08/21/23 0925   Temp 97.9  F (36.6  C) 08/21/23 0905   Pulse 83 08/21/23 0925   Resp 20 08/21/23 0926   SpO2 94 % 08/21/23 0926   Vitals shown include unvalidated device data.    Electronically Signed By: GRABIEL Daniels CRNA  August 21, 2023  10:42 AM

## 2023-08-22 NOTE — OR NURSING
Dr. Corona requested patient to be seen at clinic in AM.  Appointment made for patient at 1105; patient called and is aware of this and has no other questions and/or concerns at this time.

## 2023-08-23 ENCOUNTER — OFFICE VISIT (OUTPATIENT)
Dept: SURGERY | Facility: OTHER | Age: 68
End: 2023-08-23
Attending: SURGERY
Payer: MEDICARE

## 2023-08-23 VITALS
SYSTOLIC BLOOD PRESSURE: 122 MMHG | DIASTOLIC BLOOD PRESSURE: 84 MMHG | OXYGEN SATURATION: 96 % | RESPIRATION RATE: 20 BRPM | TEMPERATURE: 99 F | HEART RATE: 120 BPM

## 2023-08-23 DIAGNOSIS — K80.10 CALCULUS OF GALLBLADDER WITH CHRONIC CHOLECYSTITIS WITHOUT OBSTRUCTION: Primary | ICD-10-CM

## 2023-08-23 PROCEDURE — G0463 HOSPITAL OUTPT CLINIC VISIT: HCPCS

## 2023-08-23 PROCEDURE — 99024 POSTOP FOLLOW-UP VISIT: CPT | Performed by: SURGERY

## 2023-08-23 ASSESSMENT — PAIN SCALES - GENERAL: PAINLEVEL: MILD PAIN (3)

## 2023-08-23 NOTE — PROGRESS NOTES
CLINIC NOTE - POST-OP SURGERY  8/23/2023    Patient:Delores Mac    Procedure: Laparoscopic Cholecystectomy    This is a 68 year old female who is 2 day s/p Laparoscopic Cholecystectomy.  The patient is worried about her staples.  No fever.  No chills.  Overall feeling well.       Current Medications:  Current Outpatient Medications   Medication Sig Dispense Refill    Calcium Carbonate (CALCIUM 600 PO) Take 1 tablet by mouth daily      Cyanocobalamin 5000 MCG SUBL Place 1 tablet under the tongue daily      diphenhydrAMINE-APAP, sleep, (TYLENOL PM EXTRA STRENGTH PO) Take 2 tablets by mouth daily      glucosamine-chondroitinoitin 750-600 MG TABS Take 1 tablet by mouth 2 times daily      HYDROcodone-acetaminophen (NORCO) 5-325 MG tablet Take 1 tablet by mouth every 6 hours as needed for pain 18 tablet 0    Multiple Vitamins-Minerals (PRESERVISION AREDS PO) Take 1 tablet by mouth 2 times daily      rosuvastatin (CRESTOR) 5 MG tablet Take 1 tablet (5 mg) by mouth daily 90 tablet 1    Vitamin D3 (CHOLECALCIFEROL) 125 MCG (5000 UT) tablet Take 1 tablet by mouth 2 times daily         Allergies:  No Known Allergies    PHYSICAL EXAM:   Vital signs: /84   Pulse 120   Temp 99  F (37.2  C) (Tympanic)   Resp 20   SpO2 96%    Weight: [unfilled]   BMI: There is no height or weight on file to calculate BMI.   General: Normal, healthy, cooperative, in no acute distress, alert   Lungs: clear to auscultation   CV: Regular rate and rhythm without murmer   Abdominal: abdomen is soft without significant tenderness, masses, organomegaly or guarding   Wounds:  Well healing surgical scars consistent with her operation.  Staples are in place.  Surrounding erythema.  There is some minor bruising.     PATHOLOGY: Pending       ASSESSMENT:    68 year old female who is 2 day s/p Laparoscopic Cholecystectomy.  Doing well.     PLAN:   Gave reassurance.    Follow-up as arranged      Restrictions : Will continue lifting restrictions of no  more than 10 pounds until 6 weeks after surgery

## 2023-08-24 ASSESSMENT — ENCOUNTER SYMPTOMS
PALPITATIONS: 0
EYE PAIN: 0
HEADACHES: 0
SHORTNESS OF BREATH: 0
SORE THROAT: 0
CONSTIPATION: 0
HEMATURIA: 0
COUGH: 1
DIZZINESS: 0
HEARTBURN: 0
NERVOUS/ANXIOUS: 0
FREQUENCY: 1
FEVER: 0
PARESTHESIAS: 0
BREAST MASS: 0
ARTHRALGIAS: 0
WEAKNESS: 0
HEMATOCHEZIA: 0
JOINT SWELLING: 0
NAUSEA: 0
CHILLS: 0
ABDOMINAL PAIN: 0
MYALGIAS: 0
DIARRHEA: 0
DYSURIA: 0

## 2023-08-24 ASSESSMENT — ACTIVITIES OF DAILY LIVING (ADL): CURRENT_FUNCTION: NO ASSISTANCE NEEDED

## 2023-08-31 ENCOUNTER — OFFICE VISIT (OUTPATIENT)
Dept: FAMILY MEDICINE | Facility: OTHER | Age: 68
End: 2023-08-31
Attending: STUDENT IN AN ORGANIZED HEALTH CARE EDUCATION/TRAINING PROGRAM
Payer: COMMERCIAL

## 2023-08-31 VITALS
WEIGHT: 203.8 LBS | DIASTOLIC BLOOD PRESSURE: 78 MMHG | BODY MASS INDEX: 30.1 KG/M2 | SYSTOLIC BLOOD PRESSURE: 120 MMHG | OXYGEN SATURATION: 96 % | HEART RATE: 116 BPM | TEMPERATURE: 98 F

## 2023-08-31 DIAGNOSIS — F17.200 NICOTINE DEPENDENCE, UNCOMPLICATED, UNSPECIFIED NICOTINE PRODUCT TYPE: ICD-10-CM

## 2023-08-31 DIAGNOSIS — Z78.0 ASYMPTOMATIC POSTMENOPAUSAL STATUS: ICD-10-CM

## 2023-08-31 DIAGNOSIS — Z00.00 ENCOUNTER FOR MEDICARE ANNUAL WELLNESS EXAM: Primary | ICD-10-CM

## 2023-08-31 DIAGNOSIS — M85.80 OSTEOPENIA, UNSPECIFIED LOCATION: ICD-10-CM

## 2023-08-31 PROCEDURE — G0439 PPPS, SUBSEQ VISIT: HCPCS | Performed by: STUDENT IN AN ORGANIZED HEALTH CARE EDUCATION/TRAINING PROGRAM

## 2023-08-31 PROCEDURE — G0463 HOSPITAL OUTPT CLINIC VISIT: HCPCS

## 2023-08-31 PROCEDURE — 99213 OFFICE O/P EST LOW 20 MIN: CPT | Mod: 25 | Performed by: STUDENT IN AN ORGANIZED HEALTH CARE EDUCATION/TRAINING PROGRAM

## 2023-08-31 RX ORDER — VARENICLINE TARTRATE 1 MG/1
1 TABLET, FILM COATED ORAL 2 TIMES DAILY
Qty: 60 TABLET | Refills: 1 | Status: SHIPPED | OUTPATIENT
Start: 2023-09-29 | End: 2024-09-17

## 2023-08-31 RX ORDER — VARENICLINE TARTRATE 0.5 MG/1
TABLET, FILM COATED ORAL
Qty: 95 TABLET | Refills: 0 | Status: SHIPPED | OUTPATIENT
Start: 2023-08-31 | End: 2024-09-17

## 2023-08-31 ASSESSMENT — ACTIVITIES OF DAILY LIVING (ADL): CURRENT_FUNCTION: NO ASSISTANCE NEEDED

## 2023-08-31 ASSESSMENT — ENCOUNTER SYMPTOMS
CONSTIPATION: 0
HEARTBURN: 0
PARESTHESIAS: 0
HEMATURIA: 0
NERVOUS/ANXIOUS: 0
EYE PAIN: 0
DIARRHEA: 0
NAUSEA: 0
MYALGIAS: 0
FREQUENCY: 1
JOINT SWELLING: 0
CHILLS: 0
DIZZINESS: 0
SORE THROAT: 0
SHORTNESS OF BREATH: 0
HEADACHES: 0
COUGH: 1
DYSURIA: 0
FEVER: 0
BREAST MASS: 0
HEMATOCHEZIA: 0
ABDOMINAL PAIN: 0
PALPITATIONS: 0
ARTHRALGIAS: 0
WEAKNESS: 0

## 2023-08-31 ASSESSMENT — PAIN SCALES - GENERAL: PAINLEVEL: NO PAIN (0)

## 2023-08-31 NOTE — PROGRESS NOTES
"SUBJECTIVE:   Delores is a 68 year old who presents for Preventive Visit.      2023     8:28 AM   Additional Questions   Roomed by Kiera Mckenna CMA   Accompanied by self       Are you in the first 12 months of your Medicare coverage?  No    Healthy Habits:     In general, how would you rate your overall health?  Fair    Frequency of exercise:  None    Do you usually eat at least 4 servings of fruit and vegetables a day, include whole grains    & fiber and avoid regularly eating high fat or \"junk\" foods?  No    Taking medications regularly:  Yes    Medication side effects:  Not applicable    Ability to successfully perform activities of daily living:  No assistance needed    Home Safety:  Lack of grab bars in the bathroom and lack of handrails on stairs    Hearing Impairment:  No hearing concerns    In the past 6 months, have you been bothered by leaking of urine? Yes    In general, how would you rate your overall mental or emotional health?  Good    Additional concerns today:  No    Healthcare Maintenance  - Mammogram: normal 23  - PAP: done  - Colon cancer screenin2023, repeat 2025  - DEXA: osteopenia 2019  - Lung cancer screening: CT 23 - repeat one year  - Lipids: up to date    History of osteoporosis in mom. Would consider medication if needed.     Have you ever done Advance Care Planning? (For example, a Health Directive, POLST, or a discussion with a medical provider or your loved ones about your wishes): No, advance care planning information given to patient to review.  Patient declined advance care planning discussion at this time.    Smoking cessation -interested in cessation.  Has not tried anything except patches in the past.  Would like something different, and oral medication.  No history of seizures.  No significant depression.    Fall risk  Fallen 2 or more times in the past year?: No  Any fall with injury in the past year?: No    Cognitive Screening   1) Repeat 3 items " (Leader, Season, Table)    2) Clock draw: NORMAL  3) 3 item recall: Recalls 1 object   Results: NORMAL clock, 1-2 items recalled: COGNITIVE IMPAIRMENT LESS LIKELY    Mini-CogTM Copyright CAL Christensen. Licensed by the author for use in [facility]; reprinted with permission (maine@[facility]). All rights reserved.      Do you have sleep apnea, excessive snoring or daytime drowsiness? :  says he thinks she has sleep apnea, she's always tired.     Reviewed and updated as needed this visit by clinical staff   Tobacco  Allergies  Meds  Problems  Med Hx  Surg Hx  Fam Hx          Reviewed and updated as needed this visit by Provider   Tobacco  Allergies  Meds  Problems  Med Hx  Surg Hx  Fam Hx         Social History     Tobacco Use    Smoking status: Every Day     Packs/day: 0.75     Years: 50.00     Pack years: 37.50     Types: Cigarettes     Start date: 1973    Smokeless tobacco: Never    Tobacco comments:     50 years   Substance Use Topics    Alcohol use: No           8/24/2023     5:11 PM   Alcohol Use   Prescreen: >3 drinks/day or >7 drinks/week? No          No data to display              Do you have a current opioid prescription? No  Do you use any other controlled substances or medications that are not prescribed by a provider? None      Current providers sharing in care for this patient include:   Patient Care Team:  Melissa Merritt MD as PCP - General (Family Medicine)  Ramez Corona MD as Assigned Surgical Provider  Melissa Merritt MD as Assigned PCP  Melissa Merritt MD as Assigned Pain Medication Provider    The following health maintenance items are reviewed in Epic and correct as of today:  Health Maintenance   Topic Date Due    HEPATITIS C SCREENING  Never done    ZOSTER IMMUNIZATION (1 of 2) Never done    MEDICARE ANNUAL WELLNESS VISIT  Never done    DEXA  04/17/2021    COVID-19 Vaccine (5 - Moderna series) 04/22/2023    INFLUENZA VACCINE (1) 09/01/2023    COLORECTAL  CANCER SCREENING  06/22/2024    LUNG CANCER SCREENING  06/30/2024    LIPID  08/16/2024    NICOTINE/TOBACCO CESSATION COUNSELING Q 1 YR  08/31/2024    FALL RISK ASSESSMENT  08/31/2024    MAMMO SCREENING  06/30/2025    ADVANCE CARE PLANNING  08/31/2028    DTAP/TDAP/TD IMMUNIZATION (2 - Td or Tdap) 05/15/2033    PHQ-2 (once per calendar year)  Completed    Pneumococcal Vaccine: 65+ Years  Completed    IPV IMMUNIZATION  Aged Out    HPV IMMUNIZATION  Aged Out    MENINGITIS IMMUNIZATION  Aged Out       FHS-7:       6/15/2023     9:36 AM 6/30/2023    10:56 AM 8/24/2023     5:13 PM   Breast CA Risk Assessment (FHS-7)   Did any of your first-degree relatives have breast or ovarian cancer? Yes Yes Yes   Did any of your relatives have bilateral breast cancer? No  Unknown   Did any man in your family have breast cancer? No  No   Did any woman in your family have breast and ovarian cancer? Yes Yes No   Did any woman in your family have breast cancer before age 50 y? Unknown  No   Do you have 2 or more relatives with breast and/or ovarian cancer? Unknown  No   Do you have 2 or more relatives with breast and/or bowel cancer? No  No         Pertinent mammograms are reviewed under the imaging tab.    Review of Systems   Constitutional:  Negative for chills and fever.   HENT:  Positive for congestion. Negative for ear pain, hearing loss and sore throat.    Eyes:  Negative for pain and visual disturbance.   Respiratory:  Positive for cough. Negative for shortness of breath.    Cardiovascular:  Negative for chest pain, palpitations and peripheral edema.   Gastrointestinal:  Negative for abdominal pain, constipation, diarrhea, heartburn, hematochezia and nausea.   Breasts:  Negative for tenderness, breast mass and discharge.   Genitourinary:  Positive for frequency and urgency. Negative for dysuria, genital sores, hematuria, pelvic pain, vaginal bleeding and vaginal discharge.   Musculoskeletal:  Negative for arthralgias, joint swelling  "and myalgias.   Skin:  Negative for rash.   Neurological:  Negative for dizziness, weakness, headaches and paresthesias.   Psychiatric/Behavioral:  Negative for mood changes. The patient is not nervous/anxious.        OBJECTIVE:   /78 (BP Location: Left arm, Patient Position: Sitting, Cuff Size: Adult Regular)   Pulse 116   Temp 98  F (36.7  C) (Tympanic)   Wt 92.4 kg (203 lb 12.8 oz)   SpO2 96%   BMI 30.10 kg/m   Estimated body mass index is 30.1 kg/m  as calculated from the following:    Height as of 8/21/23: 1.753 m (5' 9\").    Weight as of this encounter: 92.4 kg (203 lb 12.8 oz).    Physical Exam  Constitutional:       General: She is not in acute distress.     Appearance: Normal appearance. She is well-developed. She is not ill-appearing.   HENT:      Head: Normocephalic and atraumatic.      Right Ear: Tympanic membrane and external ear normal.      Left Ear: Tympanic membrane and external ear normal.      Nose: Nose normal.      Mouth/Throat:      Mouth: Mucous membranes are moist.      Pharynx: No oropharyngeal exudate.   Eyes:      Extraocular Movements: Extraocular movements intact.      Conjunctiva/sclera: Conjunctivae normal.   Neck:      Thyroid: No thyroid mass, thyromegaly or thyroid tenderness.   Cardiovascular:      Rate and Rhythm: Normal rate and regular rhythm.      Pulses: Normal pulses.      Heart sounds: Normal heart sounds, S1 normal and S2 normal. No murmur heard.  Pulmonary:      Effort: Pulmonary effort is normal. No respiratory distress.      Breath sounds: Normal breath sounds. No wheezing, rhonchi or rales.   Chest:   Breasts:     Right: Normal. No mass or nipple discharge.      Left: Normal. No mass or nipple discharge.   Abdominal:      General: Bowel sounds are normal. There is no abdominal bruit.      Palpations: Abdomen is soft. There is no mass or pulsatile mass.      Tenderness: There is no abdominal tenderness.   Musculoskeletal:         General: Normal range of " motion.      Cervical back: Neck supple.      Right lower leg: No edema.      Left lower leg: No edema.   Lymphadenopathy:      Cervical: No cervical adenopathy.      Upper Body:      Right upper body: No supraclavicular or axillary adenopathy.      Left upper body: No supraclavicular or axillary adenopathy.   Skin:     General: Skin is warm and dry.      Capillary Refill: Capillary refill takes less than 2 seconds.      Findings: No rash.   Neurological:      Mental Status: She is alert and oriented to person, place, and time.      Gait: Gait is intact.   Psychiatric:         Attention and Perception: Attention normal.         Mood and Affect: Mood normal.         Speech: Speech normal.         Behavior: Behavior normal.         Thought Content: Thought content normal.         Cognition and Memory: Cognition normal.         Judgment: Judgment normal.           Diagnostic Test Results:  Labs reviewed in Epic    ASSESSMENT / PLAN:   Encounter for Medicare annual wellness exam  Healthcare Maintenance  - Mammogram: normal 23  - PAP: done  - Colon cancer screenin2023, repeat 2025  - DEXA: osteopenia 2019  - Lung cancer screening: CT 23 - repeat one year  - Lipids: up to date  - get shingrix at pharmacy.     Osteopenia, unspecified location  Discussed updating dexa. Would be open to medication if needed. Mom did have osteoporosis.   - DEXA HIP/PELVIS/SPINE - Future; Future    Asymptomatic postmenopausal status  - DEXA HIP/PELVIS/SPINE - Future; Future    Nicotine dependence, uncomplicated, unspecified nicotine product type  Discussed options.  Decided to proceed with Chantix.  Discussed possible side effects.  She will reach out with any adverse effects.  Could consider bupropion if Chantix is not beneficial.  - varenicline (CHANTIX) 0.5 MG tablet; Take 0.5 mg (1 tablet) once a day for 3 days, THEN 0.5 mg (1 tablet) twice daily for 4 days, THEN 1.0 mg (2 tablets) twice daily  - varenicline (CHANTIX)  "1 MG tablet; Take 1 tablet (1 mg) by mouth 2 times daily      Patient has been advised of split billing requirements and indicates understanding: No      COUNSELING:  Reviewed preventive health counseling, as reflected in patient instructions      BMI:   Estimated body mass index is 30.1 kg/m  as calculated from the following:    Height as of 8/21/23: 1.753 m (5' 9\").    Weight as of this encounter: 92.4 kg (203 lb 12.8 oz).   Weight management plan: Discussed healthy diet and exercise guidelines      She reports that she has been smoking cigarettes. She started smoking about 50 years ago. She has a 37.50 pack-year smoking history. She has never used smokeless tobacco.  Nicotine/Tobacco Cessation Plan:   Pharmacotherapies : varenicline (Chantix)      Appropriate preventive services were discussed with this patient, including applicable screening as appropriate for cardiovascular disease, diabetes, osteopenia/osteoporosis, and glaucoma.  As appropriate for age/gender, discussed screening for colorectal cancer, prostate cancer, breast cancer, and cervical cancer. Checklist reviewing preventive services available has been given to the patient.    Reviewed patients plan of care and provided an AVS. The Basic Care Plan (routine screening as documented in Health Maintenance) for Delores meets the Care Plan requirement. This Care Plan has been established and reviewed with the Patient.      Melissa Merritt MD  [facility] [place]    Identified Health Risks:  I have reviewed Opioid Use Disorder and Substance Use Disorder risk factors and made any needed referrals.   "

## 2023-08-31 NOTE — PROGRESS NOTES
The patient was provided with suggestions to help her develop a healthy physical lifestyle.  She is at risk for lack of exercise and has been provided with information to increase physical activity for the benefit of her well-being.  The patient was counseled and encouraged to consider modifying their diet and eating habits. She was provided with information on recommended healthy diet options.  Information on urinary incontinence and treatment options given to patient.

## 2023-08-31 NOTE — PATIENT INSTRUCTIONS
Patient Education     You are due for your shingles shots. Insurance does not cover the shots if done at the clinic. I would recommend going to a local pharmacy to obtain your shingles shots. It is a series of 2 shots, given now and then a second dose repeated in 2 to 6 months. It is recommended to get this shot even if you've had shingles in the past. You may experience fatigue, body aches, and arm pain for a day or so after the shot, so I recommend getting the shot when you do not have an important event going on.      Personalized Prevention Plan  You are due for the preventive services outlined below.  Your care team is available to assist you in scheduling these services.  If you have already completed any of these items, please share that information with your care team to update in your medical record.  Health Maintenance Due   Topic Date Due     Hepatitis C Screening  Never done     Zoster (Shingles) Vaccine (1 of 2) Never done     Annual Wellness Visit  Never done     Osteoporosis Screening  04/17/2021     COVID-19 Vaccine (5 - Moderna series) 04/22/2023     Your Health Risk Assessment indicates you feel you are not in good health    A healthy lifestyle helps keep the body fit and the mind alert. It helps protect you from disease, helps you fight disease, and helps prevent chronic disease (disease that doesn't go away) from getting worse. This is important as you get older and begin to notice twinges in muscles and joints and a decline in the strength and stamina you once took for granted. A healthy lifestyle includes good healthcare, good nutrition, weight control, recreation, and regular exercise. Avoid harmful substances and do what you can to keep safe. Another part of a healthy lifestyle is stay mentally active and socially involved.    Good healthcare   Have a wellness visit every year.   If you have new symptoms, let us know right away. Don't wait until the next checkup.   Take medicines exactly as  "prescribed and keep your medicines in a safe place. Tell us if your medicine causes problems.   Healthy diet and weight control   Eat 3 or 4 small, nutritious, low-fat, high-fiber meals a day. Include a variety of fruits, vegetables, and whole-grain foods.   Make sure you get enough calcium in your diet. Calcium, vitamin D, and exercise help prevent osteoporosis (bone thinning).   If you live alone, try eating with others when you can. That way you get a good meal and have company while you eat it.   Try to keep a healthy weight. If you eat more calories than your body uses for energy, it will be stored as fat and you will gain weight.     Recreation   Recreation is not limited to sports and team events. It includes any activity that provides relaxation, interest, enjoyment, and exercise. Recreation provides an outlet for physical, mental, and social energy. It can give a sense of worth and achievement. It can help you stay healthy.    Mental Exercise and Social Involvement  Mental and emotional health is as important as physical health. Keep in touch with friends and family. Stay as active as possible. Continue to learn and challenge yourself.   Things you can do to stay mentally active are:  Learn something new, like a foreign language or musical instrument.   Play SCRABBLE or do crossword puzzles. If you cannot find people to play these games with you at home, you can play them with others on your computer through the Internet.   Join a games club--anything from card games to chess or checkers or lawn bowling.   Start a new hobby.   Go back to school.   Volunteer.   Read.   Keep up with world events.  Learning About Being Physically Active  What is physical activity?     Being physically active means doing any kind of activity that gets your body moving.  The types of physical activity that can help you get fit and stay healthy include:  Aerobic or \"cardio\" activities. These make your heart beat faster and make " "you breathe harder, such as brisk walking, riding a bike, or running. They strengthen your heart and lungs and build up your endurance.  Strength training activities. These make your muscles work against, or \"resist,\" something. Examples include lifting weights or doing push-ups. These activities help tone and strengthen your muscles and bones.  Stretches. These let you move your joints and muscles through their full range of motion. Stretching helps you be more flexible.  Reaching a balance between these three types of physical activity is important because each one contributes to your overall fitness.  What are the benefits of being active?  Being active is one of the best things you can do for your health. It helps you to:  Feel stronger and have more energy to do all the things you like to do.  Focus better at school or work.  Feel, think, and sleep better.  Reach and stay at a healthy weight.  Lose fat and build lean muscle.  Lower your risk for serious health problems, including diabetes, heart attack, high blood pressure, and some cancers.  Keep your heart, lungs, bones, muscles, and joints strong and healthy.  How can you make being active part of your life?  Start slowly. Make it your long-term goal to get at least 30 minutes of exercise on most days of the week. Walking is a good choice. You also may want to do other activities, such as running, swimming, cycling, or playing tennis or team sports.  Pick activities that you like--ones that make your heart beat faster, your muscles stronger, and your muscles and joints more flexible. If you find more than one thing you like doing, do them all. You don't have to do the same thing every day.  Get your heart pumping every day. Any activity that makes your heart beat faster and keeps it at that rate for a while counts.  Here are some great ways to get your heart beating faster:  Go for a brisk walk, run, or bike ride.  Go for a hike or swim.  Go in-line " "skating.  Play a game of touch football, basketball, or soccer.  Ride a bike.  Play tennis or racquetball.  Climb stairs.  Even some household chores can be aerobic--just do them at a faster pace. Vacuuming, raking or mowing the lawn, sweeping the garage, and washing and waxing the car all can help get your heart rate up.  Strengthen your muscles during the week. You don't have to lift heavy weights or grow big, bulky muscles to get stronger. Doing a few simple activities that make your muscles work against, or \"resist,\" something can help you get stronger.  For example, you can:  Do push-ups or sit-ups, which use your own body weight as resistance.  Lift weights or dumbbells or use stretch bands at home or in a gym or community center.  Stretch your muscles often. Stretching will help you as you become more active. It can help you stay flexible, loosen tight muscles, and avoid injury. It can also help improve your balance and posture and can be a great way to relax.  Be sure to stretch the muscles you'll be using when you work out. It's best to warm your muscles slightly before you stretch them. Walk or do some other light aerobic activity for a few minutes, and then start stretching.  When you stretch your muscles:  Do it slowly. Stretching is not about going fast or making sudden movements.  Don't push or bounce during a stretch.  Hold each stretch for at least 15 to 30 seconds, if you can. You should feel a stretch in the muscle, but not pain.  Breathe out as you do the stretch. Then breathe in as you hold the stretch. Don't hold your breath.  If you're worried about how more activity might affect your health, have a checkup before you start. Follow any special advice your doctor gives you for getting a smart start.  Where can you learn more?  Go to https://www.healthwise.net/patiented  Enter W332 in the search box to learn more about \"Learning About Being Physically Active.\"  Current as of: October 10, 2022     "           Content Version: 13.7    1619-1184 [employer].   Care instructions adapted under license by your healthcare professional. If you have questions about a medical condition or this instruction, always ask your healthcare professional. [employer] disclaims any warranty or liability for your use of this information.      Learning About Dietary Guidelines  What are the Dietary Guidelines for Americans?     Dietary Guidelines for Americans provide tips for eating well and staying healthy. This helps reduce the risk for long-term (chronic) diseases.  These guidelines recommend that you:  Eat and drink the right amount for you. The U.[place] government's food guide is called MyPlate. It can help you make your own well-balanced eating plan.  Try to balance your eating with your activity. This helps you stay at a healthy weight.  Drink alcohol in moderation, if at all.  Limit foods high in salt, saturated fat, trans fat, and added sugar.  These guidelines are from the U.S. Department of Agriculture and the U.S. Department of Health and Human Services. They are updated every 5 years.  What is MyPlate?  MyPlate is the U.[place] government's food guide. It can help you make your own well-balanced eating plan. A balanced eating plan means that you eat enough, but not too much, and that your food gives you the nutrients you need to stay healthy.  MyPlate focuses on eating plenty of whole grains, fruits, and vegetables, and on limiting fat and sugar. It is available online at www.ChooseMyPlate.gov.  How can you get started?  If you're trying to eat healthier, you can slowly change your eating habits over time. You don't have to make big changes all at once. Start by adding one or two healthy foods to your meals each day.  Grains  Choose whole-grain breads and cereals and whole-wheat pasta and whole-grain crackers.  Vegetables  Eat a variety of vegetables every day. They have lots of nutrients and are part  "of a heart-healthy diet.  Fruits  Eat a variety of fruits every day. Fruits contain lots of nutrients. Choose fresh fruit instead of fruit juice.  Protein foods  Choose fish and lean poultry more often. Eat red meat and fried meats less often. Dried beans, tofu, and nuts are also good sources of protein.  Dairy  Choose low-fat or fat-free products from this food group. If you have problems digesting milk, try eating cheese or yogurt instead.  Fats and oils  Limit fats and oils if you're trying to cut calories. Choose healthy fats when you cook. These include canola oil and olive oil.  Where can you learn more?  Go to https://www.[employer].net/patiented  Enter D676 in the search box to learn more about \"Learning About Dietary Guidelines.\"  Current as of: March 1, 2023               Content Version: 13.7    0843-5087 [employer].   Care instructions adapted under license by your healthcare professional. If you have questions about a medical condition or this instruction, always ask your healthcare professional. [employer] disclaims any warranty or liability for your use of this information.      Bladder Training: Care Instructions  Your Care Instructions     Bladder training is used to treat urge incontinence and stress incontinence. Urge incontinence means that the need to urinate comes on so fast that you can't get to a toilet in time. Stress incontinence means that you leak urine because of pressure on your bladder. For example, it may happen when you laugh, cough, or lift something heavy.  Bladder training can increase how long you can wait before you have to urinate. It can also help your bladder hold more urine. And it can give you better control over the urge to urinate.  It is important to remember that bladder training takes a few weeks to a few months to make a difference. You may not see results right away, but don't give up.  Follow-up care is a key part of your treatment and " safety. Be sure to make and go to all appointments, and call your doctor if you are having problems. It's also a good idea to know your test results and keep a list of the medicines you take.  How can you care for yourself at home?  Work with your doctor to come up with a bladder training program that is right for you. You may use one or more of the following methods.  Delayed urination  In the beginning, try to keep from urinating for 5 minutes after you first feel the need to go.  While you wait, take deep, slow breaths to relax. Kegel exercises can also help you delay the need to go to the bathroom.  After some practice, when you can easily wait 5 minutes to urinate, try to wait 10 minutes before you urinate.  Slowly increase the waiting period until you are able to control when you have to urinate.  Scheduled urination  Empty your bladder when you first wake up in the morning.  Schedule times throughout the day when you will urinate.  Start by going to the bathroom every hour, even if you don't need to go.  Slowly increase the time between trips to the bathroom.  When you have found a schedule that works well for you, keep doing it.  If you wake up during the night and have to urinate, do it. Apply your schedule to waking hours only.  Kegel exercises  These tighten and strengthen pelvic muscles, which can help you control the flow of urine. (If doing these exercises causes pain, stop doing them and talk with your doctor.) To do Kegel exercises:  Squeeze your muscles as if you were trying not to pass gas. Or squeeze your muscles as if you were stopping the flow of urine. Your belly, legs, and buttocks shouldn't move.  Hold the squeeze for 3 seconds, then relax for 5 to 10 seconds.  Start with 3 seconds, then add 1 second each week until you are able to squeeze for 10 seconds.  Repeat the exercise 10 times a session. Do 3 to 8 sessions a day.  When should you call for help?  Watch closely for changes in your  "health, and be sure to contact your doctor if:    Your incontinence is getting worse.     You do not get better as expected.   Where can you learn more?  Go to https://www.[employer].net/patiented  Enter V684 in the search box to learn more about \"Bladder Training: Care Instructions.\"  Current as of: March 1, 2023               Content Version: 13.7    1702-7626 [employer].   Care instructions adapted under license by your healthcare professional. If you have questions about a medical condition or this instruction, always ask your healthcare professional. [employer] disclaims any warranty or liability for your use of this information.             Chantix (varenicline) Oral Tablet     Uses  For quitting smoking.    Instructions  Take the medicine with 250 mL (1 cup) of water.    Take the medicine after eating a meal.    Keep the medicine at room temperature. Avoid heat and direct light.    Choose a date to quit smoking. Start this medicine 1 week before your chosen day to quit smoking.    It is important that you keep taking each dose of this medicine on time even if you are feeling well.    If you forget to take a dose on time, take it as soon as you remember. If it is almost time for the next dose, do not take the missed dose. Return to your normal dosing schedule. Do not take 2 doses of this medicine at one time.    Please tell your doctor and pharmacist about all the medicines you take. Include both prescription and over-the-counter medicines. Also tell them about any vitamins, herbal medicines, or anything else you take for your health.    Cautions  This medicine is not recommended for use in children younger than 16.    Tell your doctor and pharmacist if you ever had an allergic reaction to a medicine. Symptoms of an allergic reaction can include trouble breathing, skin rash, itching, swelling, or severe dizziness.    Speak with your doctor about how you feel after you quit smoking. " Some people on this medicine may feel depressed, have trouble sleeping, become irritable, or gain weight. Discuss these symptoms with your doctor. Your doctor may wish to adjust your medication dosage.    Do not use the medication any more than instructed.    Your ability to stay alert or to react quickly may be impaired by this medicine. Do not drive or operate machinery until you know how this medicine will affect you.    Call the doctor if there are any signs of confusion or unusual changes in behavior.    Tell the doctor or pharmacist if you are pregnant, planning to be pregnant, or breastfeeding.    Ask your pharmacist if this medicine can interact with any of your other medicines. Be sure to tell them about all the medicines you take.    Do not start or stop any other medicines without first speaking to your doctor or pharmacist.    Do not share this medicine with anyone who has not been prescribed this medicine.    This medicine can cause serious side effects in some patients. Important information from the U.S. Food and Drug Administration (FDA) is available from your pharmacist. Please review it carefully with your pharmacist to understand the risks associated with this medicine.    Side Effects  The following is a list of some common side effects from this medicine. Please speak with your doctor about what you should do if you experience these or other side effects.    agitated feeling or trouble sleeping  constipation  drowsiness or sedation  excess gas  headaches  nausea  vivid dreams or nightmares  vomiting  Call your doctor or get medical help right away if you notice any of these more serious side effects:    change in behavior  chest pain  changes in memory, mood, or thinking  depression or feeling sad  hallucinations (unusual thoughts, seeing or hearing things that are not real)  mood changes  shortness of breath  symptoms of stroke (such as one-sided weakness, slurred speech, confusion)    A few  people may have an allergic reaction to this medicine. Symptoms can include difficulty breathing, skin rash, itching, swelling, or severe dizziness. If you notice any of these symptoms, seek medical help quickly.    Extra  Please speak with your doctor, nurse, or pharmacist if you have any questions about this medicine.       [email or web address]  IMPORTANT NOTE: This document tells you briefly how to take your medicine, but it does not tell you all there is to know about it. Your doctor or pharmacist may give you other documents about your medicine. Please talk to them if you have any questions. Always follow their advice. There is a more complete description of this medicine available in English. Scan this code on your smartphone or tablet or use the web address below. You can also ask your pharmacist for a printout. If you have any questions, please ask your pharmacist.   2021 [employer].      0668-9304 [employer]. All rights reserved. This information is not intended as a substitute for professional medical care. Always follow your healthcare professional's instructions.

## 2023-09-12 ENCOUNTER — OFFICE VISIT (OUTPATIENT)
Dept: SURGERY | Facility: OTHER | Age: 68
End: 2023-09-12
Attending: NURSE PRACTITIONER
Payer: MEDICARE

## 2023-09-12 VITALS
DIASTOLIC BLOOD PRESSURE: 80 MMHG | WEIGHT: 203 LBS | TEMPERATURE: 98.3 F | HEART RATE: 124 BPM | SYSTOLIC BLOOD PRESSURE: 128 MMHG | HEIGHT: 70 IN | BODY MASS INDEX: 29.06 KG/M2 | OXYGEN SATURATION: 96 %

## 2023-09-12 DIAGNOSIS — Z90.49 STATUS POST CHOLECYSTECTOMY: Primary | ICD-10-CM

## 2023-09-12 PROCEDURE — 99024 POSTOP FOLLOW-UP VISIT: CPT | Performed by: NURSE PRACTITIONER

## 2023-09-12 PROCEDURE — G0463 HOSPITAL OUTPT CLINIC VISIT: HCPCS

## 2023-09-12 ASSESSMENT — PAIN SCALES - GENERAL: PAINLEVEL: NO PAIN (0)

## 2023-09-12 NOTE — PROGRESS NOTES
"CLINIC NOTE - POST-OP SURGERY  9/12/2023    Patient:Delores Mac    Procedure: Laparoscopic Cholecystectomy    This is a 68 year old female who is 3 weeks s/p Laparoscopic Cholecystectomy.  The patient has no complaints today.     Current Medications:  Current Outpatient Medications   Medication Sig Dispense Refill    Calcium Carbonate (CALCIUM 600 PO) Take 1 tablet by mouth daily      Cyanocobalamin 5000 MCG SUBL Place 1 tablet under the tongue daily      diphenhydrAMINE-APAP, sleep, (TYLENOL PM EXTRA STRENGTH PO) Take 2 tablets by mouth daily      glucosamine-chondroitinoitin 750-600 MG TABS Take 1 tablet by mouth 2 times daily      Multiple Vitamins-Minerals (PRESERVISION AREDS PO) Take 1 tablet by mouth 2 times daily      rosuvastatin (CRESTOR) 5 MG tablet Take 1 tablet (5 mg) by mouth daily 90 tablet 1    varenicline (CHANTIX) 0.5 MG tablet Take 0.5 mg (1 tablet) once a day for 3 days, THEN 0.5 mg (1 tablet) twice daily for 4 days, THEN 1.0 mg (2 tablets) twice daily 95 tablet 0    [START ON 9/29/2023] varenicline (CHANTIX) 1 MG tablet Take 1 tablet (1 mg) by mouth 2 times daily 60 tablet 1    Vitamin D3 (CHOLECALCIFEROL) 125 MCG (5000 UT) tablet Take 1 tablet by mouth 2 times daily         Allergies:  No Known Allergies    PHYSICAL EXAM:   Vital signs: /80 (BP Location: Right arm, Cuff Size: Adult Large)   Pulse (!) 124   Temp 98.3  F (36.8  C) (Tympanic)   Ht 1.778 m (5' 10\")   Wt 92.1 kg (203 lb)   SpO2 96%   BMI 29.13 kg/m     BMI: Body mass index is 29.13 kg/m .   General: Normal, healthy, cooperative, in no acute distress, alert   Lungs: respirations are non-labored   Abdominal: non-distended   Wounds:  Well healed surgical scars consistent with her operation.     PATHOLOGY:  Case Report   Date Value Ref Range Status   08/21/2023   Final    Surgical Pathology Report                         Case: [id number]                                  Authorizing Provider:  Ramez Corona MD  " Collected:           08/21/2023 08:02 AM          Ordering Location:     [facility]     Received:            08/21/2023 01:45 PM          Pathologist:           Willi Randall DO                                                         Specimen:    Gallbladder                                                                                 Final Diagnosis   Date Value Ref Range Status   08/21/2023   Final    A.  Gallbladder, cholecystectomy:  -Gallbladder with mild chronic cholecystitis and marked mural fibrosis.  -Cholelithiasis with calculus entrapment within the gallbladder neck.          ASSESSMENT:    68 year old female who is 3 weeks s/p Laparoscopic Cholecystectomy.  Doing well.     PLAN:   Staples were removed without problems    Follow-up as needed      Restrictions : Will continue lifting restrictions of no more than 10 pounds until 6 weeks after surgery

## 2023-09-21 ENCOUNTER — HOSPITAL ENCOUNTER (OUTPATIENT)
Dept: RESPIRATORY THERAPY | Facility: HOSPITAL | Age: 68
Discharge: HOME | End: 2023-09-21
Attending: STUDENT IN AN ORGANIZED HEALTH CARE EDUCATION/TRAINING PROGRAM | Admitting: INTERNAL MEDICINE
Payer: MEDICARE

## 2023-09-21 DIAGNOSIS — R09.89 ABNORMAL LUNG SOUNDS: ICD-10-CM

## 2023-09-21 DIAGNOSIS — F17.200 TOBACCO DEPENDENCE: Chronic | ICD-10-CM

## 2023-09-21 PROCEDURE — 94060 EVALUATION OF WHEEZING: CPT | Mod: 26 | Performed by: INTERNAL MEDICINE

## 2023-09-21 PROCEDURE — 94729 DIFFUSING CAPACITY: CPT | Mod: 26 | Performed by: INTERNAL MEDICINE

## 2023-09-21 PROCEDURE — 94729 DIFFUSING CAPACITY: CPT

## 2023-09-21 PROCEDURE — 94726 PLETHYSMOGRAPHY LUNG VOLUMES: CPT | Mod: 26 | Performed by: INTERNAL MEDICINE

## 2023-09-21 PROCEDURE — 250N000009 HC RX 250: Performed by: STUDENT IN AN ORGANIZED HEALTH CARE EDUCATION/TRAINING PROGRAM

## 2023-09-21 PROCEDURE — 94726 PLETHYSMOGRAPHY LUNG VOLUMES: CPT

## 2023-09-21 PROCEDURE — 94060 EVALUATION OF WHEEZING: CPT

## 2023-09-21 RX ORDER — ALBUTEROL SULFATE 0.83 MG/ML
2.5 SOLUTION RESPIRATORY (INHALATION)
Status: COMPLETED | OUTPATIENT
Start: 2023-09-21 | End: 2023-09-21

## 2023-09-21 RX ADMIN — ALBUTEROL SULFATE 2.5 MG: 2.5 SOLUTION RESPIRATORY (INHALATION) at 07:28

## 2023-10-04 PROBLEM — J43.2 CENTRILOBULAR EMPHYSEMA (H): Status: ACTIVE | Noted: 2023-10-04

## 2023-10-18 ENCOUNTER — HOSPITAL ENCOUNTER (OUTPATIENT)
Dept: BONE DENSITY | Facility: HOSPITAL | Age: 68
Discharge: HOME | End: 2023-10-18
Attending: STUDENT IN AN ORGANIZED HEALTH CARE EDUCATION/TRAINING PROGRAM | Admitting: STUDENT IN AN ORGANIZED HEALTH CARE EDUCATION/TRAINING PROGRAM
Payer: MEDICARE

## 2023-10-18 PROCEDURE — 77080 DXA BONE DENSITY AXIAL: CPT

## 2023-10-19 PROBLEM — M85.80 OSTEOPENIA: Status: ACTIVE | Noted: 2023-10-19

## 2023-10-19 PROBLEM — Z91.89 FRACTURE RISK ASSESSMENT SCORE (FRAX) INDICATING GREATER THAN 3% RISK FOR HIP FRACTURE: Status: ACTIVE | Noted: 2023-10-19

## 2024-02-14 ENCOUNTER — MYC REFILL (OUTPATIENT)
Dept: FAMILY MEDICINE | Facility: OTHER | Age: 69
End: 2024-02-14

## 2024-02-14 DIAGNOSIS — E78.2 MIXED HYPERLIPIDEMIA: Chronic | ICD-10-CM

## 2024-02-14 NOTE — TELEPHONE ENCOUNTER
Rosuvastatin (Crestor) 5 MG tablet    Last Written Prescription Date:  08/16/2023  Last Fill Quantity: 90,   # refills: 1  Last Office Visit: 08/31/2023

## 2024-02-16 RX ORDER — ROSUVASTATIN CALCIUM 5 MG/1
5 TABLET, COATED ORAL DAILY
Qty: 90 TABLET | Refills: 1 | Status: SHIPPED | OUTPATIENT
Start: 2024-02-16 | End: 2024-09-04

## 2024-04-24 ENCOUNTER — TELEPHONE (OUTPATIENT)
Dept: FAMILY MEDICINE | Facility: OTHER | Age: 69
End: 2024-04-24

## 2024-04-24 NOTE — TELEPHONE ENCOUNTER
2:48 PM    Reason for Call: Phone Call    Description: Patient wants to have UTI labs ordered so she can do labs tomorrow morning, Friday or Monday. Please call patient to schedule a lab appointment.    Was an appointment offered for this call? No  If yes : Appointment type              Date    Preferred method for responding to this message: Telephone Call  What is your phone number ? [phone]    If we cannot reach you directly, may we leave a detailed response at the number you provided?  Yes    Can this message wait until your PCP/provider returns, if available today? Please call patient to schedule lab appointment.    Justine Hatfield

## 2024-04-25 ENCOUNTER — OFFICE VISIT (OUTPATIENT)
Dept: FAMILY MEDICINE | Facility: OTHER | Age: 69
End: 2024-04-25
Attending: FAMILY MEDICINE
Payer: MEDICARE

## 2024-04-25 ENCOUNTER — LAB (OUTPATIENT)
Dept: LAB | Facility: OTHER | Age: 69
End: 2024-04-25
Payer: COMMERCIAL

## 2024-04-25 VITALS
SYSTOLIC BLOOD PRESSURE: 139 MMHG | BODY MASS INDEX: 30.83 KG/M2 | OXYGEN SATURATION: 98 % | HEART RATE: 96 BPM | DIASTOLIC BLOOD PRESSURE: 71 MMHG | TEMPERATURE: 98.9 F | HEIGHT: 70 IN | RESPIRATION RATE: 16 BRPM | WEIGHT: 215.38 LBS

## 2024-04-25 DIAGNOSIS — R30.0 DYSURIA: ICD-10-CM

## 2024-04-25 DIAGNOSIS — R30.0 DYSURIA: Primary | ICD-10-CM

## 2024-04-25 DIAGNOSIS — N76.0 VAGINITIS AND VULVOVAGINITIS: ICD-10-CM

## 2024-04-25 LAB
ALBUMIN UR-MCNC: 10 MG/DL
APPEARANCE UR: CLEAR
BACTERIA #/AREA URNS HPF: ABNORMAL /HPF
BILIRUB UR QL STRIP: NEGATIVE
CLUE CELLS: ABNORMAL
COLOR UR AUTO: ABNORMAL
GLUCOSE UR STRIP-MCNC: NEGATIVE MG/DL
HGB UR QL STRIP: NEGATIVE
KETONES UR STRIP-MCNC: NEGATIVE MG/DL
LEUKOCYTE ESTERASE UR QL STRIP: ABNORMAL
MUCOUS THREADS #/AREA URNS LPF: PRESENT /LPF
NITRATE UR QL: NEGATIVE
PH UR STRIP: 6 [PH] (ref 4.7–8)
RBC URINE: 4 /HPF
SP GR UR STRIP: 1.02 (ref 1–1.03)
SQUAMOUS EPITHELIAL: 1 /HPF
TRICHOMONAS, WET PREP: ABNORMAL
UROBILINOGEN UR STRIP-MCNC: NORMAL MG/DL
WBC URINE: 12 /HPF
WBC'S/HIGH POWER FIELD, WET PREP: ABNORMAL
YEAST, WET PREP: ABNORMAL

## 2024-04-25 PROCEDURE — G0463 HOSPITAL OUTPT CLINIC VISIT: HCPCS

## 2024-04-25 PROCEDURE — 99213 OFFICE O/P EST LOW 20 MIN: CPT | Performed by: FAMILY MEDICINE

## 2024-04-25 PROCEDURE — 87086 URINE CULTURE/COLONY COUNT: CPT | Mod: ZL

## 2024-04-25 PROCEDURE — 87210 SMEAR WET MOUNT SALINE/INK: CPT | Mod: ZL | Performed by: FAMILY MEDICINE

## 2024-04-25 PROCEDURE — 81001 URINALYSIS AUTO W/SCOPE: CPT | Mod: ZL

## 2024-04-25 RX ORDER — CLOTRIMAZOLE 1 %
CREAM (GRAM) TOPICAL 2 TIMES DAILY
Qty: 60 G | Refills: 1 | Status: SHIPPED | OUTPATIENT
Start: 2024-04-25

## 2024-04-25 RX ORDER — HYDROCORTISONE 2.5 %
CREAM (GRAM) TOPICAL 2 TIMES DAILY
Qty: 60 G | Refills: 1 | Status: SHIPPED | OUTPATIENT
Start: 2024-04-25

## 2024-04-25 RX ORDER — MUPIROCIN CALCIUM 20 MG/G
CREAM TOPICAL 2 TIMES DAILY
Qty: 30 G | Refills: 1 | Status: SHIPPED | OUTPATIENT
Start: 2024-04-25

## 2024-04-25 ASSESSMENT — PAIN SCALES - GENERAL: PAINLEVEL: EXTREME PAIN (8)

## 2024-04-25 NOTE — PROGRESS NOTES
"  Assessment & Plan     Dysuria  See below  - UA with Microscopic reflex to Culture; Future  - Wet preparation    Vaginitis and vulvovaginitis  VERY angry looking vulva and vagina.  Will have her combined the below. Clean and wash well BID then apply . Pt under stress with  need CABG- hygiene may be the cause. Has some Lichen Sclerosis. Needs recheck when improves.  Follow-up appt mid MAy after back from [facility] on May 1st for    - clotrimazole (LOTRIMIN) 1 % external cream; Apply topically 2 times daily  - hydrocortisone 2.5 % cream; Apply topically 2 times daily  - mupirocin (BACTROBAN) 2 % external cream; Apply topically 2 times daily            BMI  Estimated body mass index is 30.9 kg/m  as calculated from the following:    Height as of this encounter: 1.778 m (5' 10\").    Weight as of this encounter: 97.7 kg (215 lb 6 oz).             No follow-ups on file.    Elida Estrella is a 68 year old, presenting for the following health issues:  Urinary Pain    History of Present Illness       Reason for visit:  Uti  Symptom onset:  1-2 weeks ago  Symptoms include:  Burning itching  Symptom intensity:  Severe  Symptom progression:  Staying the same  Had these symptoms before:  Yes  Has tried/received treatment for these symptoms:  Yes  Previous treatment was successful:  Yes  Prior treatment description:  Antibiotics  What makes it worse:  No  What makes it better:  No    She eats 2-3 servings of fruits and vegetables daily.She consumes 1 sweetened beverage(s) daily.She exercises with enough effort to increase her heart rate 10 to 19 minutes per day.  She exercises with enough effort to increase her heart rate 4 days per week.   She is taking medications regularly.       Genitourinary - Female  Onset/Duration: 1-2 weeks ago   Description:   Painful urination (Dysuria): YES           Frequency: YES  Blood in urine (Hematuria): No  Delay in urine (Hesitency): YES  Intensity: severe  Progression of " "Symptoms:  worsening  Accompanying Signs & Symptoms:  Fever/chills: No  Flank pain: YES  Nausea and vomiting: No  Vaginal symptoms: odor, itching, and patient reports possible sores, discharge   Abdominal/Pelvic Pain: No  History:   History of frequent UTI s: No  History of kidney stones: No  Sexually Active: No  Possibility of pregnancy: Yes  Precipitating or alleviating factors: None  Therapies tried and outcome: Cranberry juice prn (contraindicated in Coumadin patients) and Increase fluid intake        Review of Systems  Constitutional, HEENT, cardiovascular, pulmonary, gi and gu systems are negative, except as otherwise noted.      Objective    /71 (BP Location: Left arm, Patient Position: Sitting, Cuff Size: Adult Large)   Pulse 96   Temp 98.9  F (37.2  C) (Tympanic)   Resp 16   Ht 1.778 m (5' 10\")   Wt 97.7 kg (215 lb 6 oz)   SpO2 98%   BMI 30.90 kg/m    There is no height or weight on file to calculate BMI.  Physical Exam   GENERAL: alert and no distress   (female): angry looking bottom- lots of swelling of vulva /clitoris/ few small papustules  no ulcers. Poor hygiene. Some stool in rectal area .  Lichen Sclerosis noted  Discussed   Results for orders placed or performed in visit on 04/25/24   UA with Microscopic reflex to Culture     Status: Abnormal    Specimen: Urine, Clean Catch   Result Value Ref Range    Color Urine Light Yellow Colorless, Straw, Light Yellow, Yellow    Appearance Urine Clear Clear    Glucose Urine Negative Negative mg/dL    Bilirubin Urine Negative Negative    Ketones Urine Negative Negative mg/dL    Specific Gravity Urine 1.021 1.003 - 1.035    Blood Urine Negative Negative    pH Urine 6.0 4.7 - 8.0    Protein Albumin Urine 10 (A) Negative mg/dL    Urobilinogen Urine Normal Normal, 2.0 mg/dL    Nitrite Urine Negative Negative    Leukocyte Esterase Urine Small (A) Negative    Bacteria Urine Few (A) None Seen /HPF    Mucus Urine Present (A) None Seen /LPF    RBC Urine 4 " (H) <=2 /HPF    WBC Urine 12 (H) <=5 /HPF    Squamous Epithelials Urine 1 <=1 /HPF    Narrative    Urine Culture ordered based on laboratory criteria             Signed Electronically by: Alexis Sanchez MD

## 2024-04-26 LAB — BACTERIA UR CULT: NORMAL

## 2024-09-04 ENCOUNTER — MYC REFILL (OUTPATIENT)
Dept: FAMILY MEDICINE | Facility: OTHER | Age: 69
End: 2024-09-04

## 2024-09-04 DIAGNOSIS — E78.2 MIXED HYPERLIPIDEMIA: Chronic | ICD-10-CM

## 2024-09-05 NOTE — TELEPHONE ENCOUNTER
Crestor      Last Written Prescription Date:  02/16/24  Last Fill Quantity: 90,   # refills: 1  Last Office Visit: 04/25/24  Future Office visit:    Next 5 appointments (look out 90 days)      Sep 17, 2024 10:00 AM  (Arrive by 9:45 AM)  Adult Preventative Visit with Melissa Merritt MD  [facility] [place] ([facility] [place] ) [address]  [place] [address]  [phone]

## 2024-09-06 RX ORDER — ROSUVASTATIN CALCIUM 5 MG/1
5 TABLET, COATED ORAL DAILY
Qty: 90 TABLET | Refills: 3 | Status: SHIPPED | OUTPATIENT
Start: 2024-09-06 | End: 2024-09-17

## 2024-09-17 ENCOUNTER — OFFICE VISIT (OUTPATIENT)
Dept: FAMILY MEDICINE | Facility: OTHER | Age: 69
End: 2024-09-17
Attending: STUDENT IN AN ORGANIZED HEALTH CARE EDUCATION/TRAINING PROGRAM
Payer: MEDICARE

## 2024-09-17 ENCOUNTER — LAB (OUTPATIENT)
Dept: LAB | Facility: OTHER | Age: 69
End: 2024-09-17
Attending: STUDENT IN AN ORGANIZED HEALTH CARE EDUCATION/TRAINING PROGRAM
Payer: COMMERCIAL

## 2024-09-17 VITALS
BODY MASS INDEX: 31.55 KG/M2 | HEIGHT: 69 IN | HEART RATE: 105 BPM | RESPIRATION RATE: 18 BRPM | OXYGEN SATURATION: 94 % | WEIGHT: 213 LBS | DIASTOLIC BLOOD PRESSURE: 80 MMHG | TEMPERATURE: 98.8 F | SYSTOLIC BLOOD PRESSURE: 134 MMHG

## 2024-09-17 DIAGNOSIS — R73.01 ELEVATED FASTING GLUCOSE: ICD-10-CM

## 2024-09-17 DIAGNOSIS — R91.8 PULMONARY NODULES: Chronic | ICD-10-CM

## 2024-09-17 DIAGNOSIS — E78.2 MIXED HYPERLIPIDEMIA: Chronic | ICD-10-CM

## 2024-09-17 DIAGNOSIS — N63.42 SUBAREOLAR MASS OF LEFT BREAST: ICD-10-CM

## 2024-09-17 DIAGNOSIS — L90.0 LICHEN SCLEROSUS: ICD-10-CM

## 2024-09-17 DIAGNOSIS — J43.2 CENTRILOBULAR EMPHYSEMA (H): ICD-10-CM

## 2024-09-17 DIAGNOSIS — Z91.89 FRACTURE RISK ASSESSMENT SCORE (FRAX) INDICATING GREATER THAN 3% RISK FOR HIP FRACTURE: ICD-10-CM

## 2024-09-17 DIAGNOSIS — M85.80 OSTEOPENIA, UNSPECIFIED LOCATION: ICD-10-CM

## 2024-09-17 DIAGNOSIS — N30.00 ACUTE CYSTITIS WITHOUT HEMATURIA: ICD-10-CM

## 2024-09-17 DIAGNOSIS — Z86.0100 HISTORY OF COLONIC POLYPS: ICD-10-CM

## 2024-09-17 DIAGNOSIS — N39.3 FEMALE STRESS INCONTINENCE: ICD-10-CM

## 2024-09-17 DIAGNOSIS — Z87.891 PERSONAL HISTORY OF NICOTINE DEPENDENCE: ICD-10-CM

## 2024-09-17 DIAGNOSIS — Z00.00 ENCOUNTER FOR MEDICARE ANNUAL WELLNESS EXAM: Primary | ICD-10-CM

## 2024-09-17 DIAGNOSIS — Z23 NEED FOR PROPHYLACTIC VACCINATION AND INOCULATION AGAINST INFLUENZA: ICD-10-CM

## 2024-09-17 DIAGNOSIS — F17.200 TOBACCO DEPENDENCE: Chronic | ICD-10-CM

## 2024-09-17 PROBLEM — R73.03 PREDIABETES: Chronic | Status: ACTIVE | Noted: 2024-09-17

## 2024-09-17 PROBLEM — R19.5 LOOSE STOOLS: Status: RESOLVED | Noted: 2023-05-15 | Resolved: 2024-09-17

## 2024-09-17 PROBLEM — R10.9 ABDOMINAL CRAMPING: Status: RESOLVED | Noted: 2023-05-15 | Resolved: 2024-09-17

## 2024-09-17 PROBLEM — R73.03 PREDIABETES: Status: ACTIVE | Noted: 2024-09-17

## 2024-09-17 LAB
ALBUMIN SERPL BCG-MCNC: 4.3 G/DL (ref 3.5–5.2)
ALBUMIN UR-MCNC: 10 MG/DL
ALP SERPL-CCNC: 118 U/L (ref 40–150)
ALT SERPL W P-5'-P-CCNC: 20 U/L (ref 0–50)
ANION GAP SERPL CALCULATED.3IONS-SCNC: 13 MMOL/L (ref 7–15)
APPEARANCE UR: ABNORMAL
AST SERPL W P-5'-P-CCNC: 17 U/L (ref 0–45)
BACTERIA #/AREA URNS HPF: ABNORMAL /HPF
BASOPHILS # BLD AUTO: 0.1 10E3/UL (ref 0–0.2)
BASOPHILS NFR BLD AUTO: 1 %
BILIRUB SERPL-MCNC: 0.3 MG/DL
BILIRUB UR QL STRIP: NEGATIVE
BUN SERPL-MCNC: 18.4 MG/DL (ref 8–23)
CALCIUM SERPL-MCNC: 9.5 MG/DL (ref 8.8–10.4)
CHLORIDE SERPL-SCNC: 104 MMOL/L (ref 98–107)
CHOLEST SERPL-MCNC: 221 MG/DL
COLOR UR AUTO: YELLOW
CREAT SERPL-MCNC: 0.87 MG/DL (ref 0.51–0.95)
EGFRCR SERPLBLD CKD-EPI 2021: 71 ML/MIN/1.73M2
EOSINOPHIL # BLD AUTO: 0.1 10E3/UL (ref 0–0.7)
EOSINOPHIL NFR BLD AUTO: 2 %
ERYTHROCYTE [DISTWIDTH] IN BLOOD BY AUTOMATED COUNT: 13.7 % (ref 10–15)
EST. AVERAGE GLUCOSE BLD GHB EST-MCNC: 126 MG/DL
FASTING STATUS PATIENT QL REPORTED: YES
FASTING STATUS PATIENT QL REPORTED: YES
GLUCOSE SERPL-MCNC: 118 MG/DL (ref 70–99)
GLUCOSE UR STRIP-MCNC: NEGATIVE MG/DL
HBA1C MFR BLD: 6 %
HCO3 SERPL-SCNC: 23 MMOL/L (ref 22–29)
HCT VFR BLD AUTO: 47.1 % (ref 35–47)
HDLC SERPL-MCNC: 55 MG/DL
HGB BLD-MCNC: 15.6 G/DL (ref 11.7–15.7)
HGB UR QL STRIP: NEGATIVE
HYALINE CASTS: 5 /LPF
IMM GRANULOCYTES # BLD: 0 10E3/UL
IMM GRANULOCYTES NFR BLD: 0 %
KETONES UR STRIP-MCNC: NEGATIVE MG/DL
LDLC SERPL CALC-MCNC: 117 MG/DL
LEUKOCYTE ESTERASE UR QL STRIP: ABNORMAL
LYMPHOCYTES # BLD AUTO: 1.8 10E3/UL (ref 0.8–5.3)
LYMPHOCYTES NFR BLD AUTO: 19 %
MCH RBC QN AUTO: 30.6 PG (ref 26.5–33)
MCHC RBC AUTO-ENTMCNC: 33.1 G/DL (ref 31.5–36.5)
MCV RBC AUTO: 93 FL (ref 78–100)
MONOCYTES # BLD AUTO: 0.8 10E3/UL (ref 0–1.3)
MONOCYTES NFR BLD AUTO: 9 %
MUCOUS THREADS #/AREA URNS LPF: PRESENT /LPF
NEUTROPHILS # BLD AUTO: 6.4 10E3/UL (ref 1.6–8.3)
NEUTROPHILS NFR BLD AUTO: 69 %
NITRATE UR QL: POSITIVE
NONHDLC SERPL-MCNC: 166 MG/DL
NRBC # BLD AUTO: 0 10E3/UL
NRBC BLD AUTO-RTO: 0 /100
PH UR STRIP: 6.5 [PH] (ref 4.7–8)
PLATELET # BLD AUTO: 253 10E3/UL (ref 150–450)
POTASSIUM SERPL-SCNC: 4.2 MMOL/L (ref 3.4–5.3)
PROT SERPL-MCNC: 7.3 G/DL (ref 6.4–8.3)
RBC # BLD AUTO: 5.09 10E6/UL (ref 3.8–5.2)
RBC URINE: 5 /HPF
SODIUM SERPL-SCNC: 140 MMOL/L (ref 135–145)
SP GR UR STRIP: 1.02 (ref 1–1.03)
SQUAMOUS EPITHELIAL: 0 /HPF
TRIGL SERPL-MCNC: 244 MG/DL
UROBILINOGEN UR STRIP-MCNC: NORMAL MG/DL
WBC # BLD AUTO: 9.3 10E3/UL (ref 4–11)
WBC URINE: 21 /HPF

## 2024-09-17 PROCEDURE — 80053 COMPREHEN METABOLIC PANEL: CPT | Mod: ZL

## 2024-09-17 PROCEDURE — 90662 IIV NO PRSV INCREASED AG IM: CPT

## 2024-09-17 PROCEDURE — 80061 LIPID PANEL: CPT | Mod: ZL

## 2024-09-17 PROCEDURE — 87186 SC STD MICRODIL/AGAR DIL: CPT | Mod: ZL | Performed by: STUDENT IN AN ORGANIZED HEALTH CARE EDUCATION/TRAINING PROGRAM

## 2024-09-17 PROCEDURE — 81001 URINALYSIS AUTO W/SCOPE: CPT | Mod: ZL | Performed by: STUDENT IN AN ORGANIZED HEALTH CARE EDUCATION/TRAINING PROGRAM

## 2024-09-17 PROCEDURE — G0463 HOSPITAL OUTPT CLINIC VISIT: HCPCS | Mod: 25

## 2024-09-17 PROCEDURE — 85025 COMPLETE CBC W/AUTO DIFF WBC: CPT | Mod: ZL

## 2024-09-17 PROCEDURE — 87086 URINE CULTURE/COLONY COUNT: CPT | Mod: ZL | Performed by: STUDENT IN AN ORGANIZED HEALTH CARE EDUCATION/TRAINING PROGRAM

## 2024-09-17 PROCEDURE — 36415 COLL VENOUS BLD VENIPUNCTURE: CPT | Mod: ZL

## 2024-09-17 PROCEDURE — 99214 OFFICE O/P EST MOD 30 MIN: CPT | Mod: 25 | Performed by: STUDENT IN AN ORGANIZED HEALTH CARE EDUCATION/TRAINING PROGRAM

## 2024-09-17 PROCEDURE — G0439 PPPS, SUBSEQ VISIT: HCPCS | Performed by: STUDENT IN AN ORGANIZED HEALTH CARE EDUCATION/TRAINING PROGRAM

## 2024-09-17 PROCEDURE — 83036 HEMOGLOBIN GLYCOSYLATED A1C: CPT | Mod: ZL

## 2024-09-17 RX ORDER — ROSUVASTATIN CALCIUM 10 MG/1
10 TABLET, COATED ORAL DAILY
Qty: 90 TABLET | Refills: 3 | Status: SHIPPED | OUTPATIENT
Start: 2024-09-17

## 2024-09-17 RX ORDER — NITROFURANTOIN 25; 75 MG/1; MG/1
100 CAPSULE ORAL 2 TIMES DAILY
Qty: 10 CAPSULE | Refills: 0 | Status: SHIPPED | OUTPATIENT
Start: 2024-09-17 | End: 2024-09-22

## 2024-09-17 RX ORDER — MUPIROCIN 20 MG/G
OINTMENT TOPICAL 2 TIMES DAILY
COMMUNITY
Start: 2024-04-25

## 2024-09-17 RX ORDER — CLOBETASOL PROPIONATE 0.5 MG/G
OINTMENT TOPICAL
Qty: 30 G | Refills: 0 | Status: SHIPPED | OUTPATIENT
Start: 2024-09-17

## 2024-09-17 ASSESSMENT — PAIN SCALES - GENERAL: PAINLEVEL: NO PAIN (0)

## 2024-09-17 NOTE — PATIENT INSTRUCTIONS
To help keep bones strong, in addition to routine cardio exercise and weight lifting, you should get 1000 units Vitamin D daily and 1200 mg of Calcium (from diet and supplement). These can be found in women's multivitamins or as an additional supplement. Please check the label to ensure that your vitamin has enough of each.      Increase cholesterol medicine.   Get RSV and covid shot at the pharmacy.   Radiology will call for mammogram and ultrasound, CT scan of lungs  Colonoscopy referral   I will let you know about imaging for possible fistula.   Will put in referral to OB for stress incontinence.     Lichen Sclerosis  Apply Clobetasol propionate ointment to the vaginal area (with the white plaques) per this taper regimen below. You should use 0.5 finger tip units (a FTU is the amount of ointment expressed from a tube with a 5 mm nozzle, applied from the distal skin crease of the index finger to the tip (approximately 0.5 g). A 30g tube should last enough for the initial treatment period. Only apply to areas of plaques, whitening, thickening. Avoid areas with a lot of hair that look normal and avoid the groin fold.    1. Apply nightly for four weeks   2. Apply every other night for four weeks   3. Apply 2x/week for four weeks    Follow up needed in 6-12 weeks.   We may need to consider a maintenance regimen, applying mometasone furoate 0.1% (another steroid ointment) 2-3 times per week chronically to prevent recurrence.   To prevent skin injury from steroid, no more than one 30 g tube of superpotent topical steroid should be used every six months once in the maintenance phase.           Patient Education   Preventive Care Advice   This is general advice given by our system to help you stay healthy. However, your care team may have specific advice just for you. Please talk to your care team about your preventive care needs.  Nutrition  Eat 5 or more servings of fruits and vegetables each day.  Try wheat bread,  brown rice and whole grain pasta (instead of white bread, rice, and pasta).  Get enough calcium and vitamin D. Check the label on foods and aim for 100% of the RDA (recommended daily allowance).  Lifestyle  Exercise at least 150 minutes each week  (30 minutes a day, 5 days a week).  Do muscle strengthening activities 2 days a week. These help control your weight and prevent disease.  No smoking.  Wear sunscreen to prevent skin cancer.  Have a dental exam and cleaning every 6 months.  Yearly exams  See your health care team every year to talk about:  Any changes in your health.  Any medicines your care team has prescribed.  Preventive care, family planning, and ways to prevent chronic diseases.  Shots (vaccines)   HPV shots (up to age 26), if you've never had them before.  Hepatitis B shots (up to age 59), if you've never had them before.  COVID-19 shot: Get this shot when it's due.  Flu shot: Get a flu shot every year.  Tetanus shot: Get a tetanus shot every 10 years.  Pneumococcal, hepatitis A, and RSV shots: Ask your care team if you need these based on your risk.  Shingles shot (for age 50 and up)  General health tests  Diabetes screening:  Starting at age 35, Get screened for diabetes at least every 3 years.  If you are younger than age 35, ask your care team if you should be screened for diabetes.  Cholesterol test: At age 39, start having a cholesterol test every 5 years, or more often if advised.  Bone density scan (DEXA): At age 50, ask your care team if you should have this scan for osteoporosis (brittle bones).  Hepatitis C: Get tested at least once in your life.  STIs (sexually transmitted infections)  Before age 24: Ask your care team if you should be screened for STIs.  After age 24: Get screened for STIs if you're at risk. You are at risk for STIs (including HIV) if:  You are sexually active with more than one person.  You don't use condoms every time.  You or a partner was diagnosed with a sexually  transmitted infection.  If you are at risk for HIV, ask about PrEP medicine to prevent HIV.  Get tested for HIV at least once in your life, whether you are at risk for HIV or not.  Cancer screening tests  Cervical cancer screening: If you have a cervix, begin getting regular cervical cancer screening tests starting at age 21.  Breast cancer scan (mammogram): If you've ever had breasts, begin having regular mammograms starting at age 40. This is a scan to check for breast cancer.  Colon cancer screening: It is important to start screening for colon cancer at age 45.  Have a colonoscopy test every 10 years (or more often if you're at risk) Or, ask your provider about stool tests like a FIT test every year or Cologuard test every 3 years.  To learn more about your testing options, visit:   .  For help making a decision, visit:   https://bit.ly/pn03890.  Prostate cancer screening test: If you have a prostate, ask your care team if a prostate cancer screening test (PSA) at age 55 is right for you.  Lung cancer screening: If you are a current or former smoker ages 50 to 80, ask your care team if ongoing lung cancer screenings are right for you.  For informational purposes only. Not to replace the advice of your health care provider. Copyright   2023 [facility]. All rights reserved. Clinically reviewed by the [facility]. [employer] 273090 - REV 01/24.  Bladder Training: Care Instructions  Your Care Instructions     Bladder training is used to treat urge incontinence and stress incontinence. Urge incontinence means that the need to urinate comes on so fast that you can't get to a toilet in time. Stress incontinence means that you leak urine because of pressure on your bladder. For example, it may happen when you laugh, cough, or lift something heavy.  Bladder training can increase how long you can wait before you have to urinate. It can also help your bladder hold more urine. And it  can give you better control over the urge to urinate.  It is important to remember that bladder training takes a few weeks to a few months to make a difference. You may not see results right away, but don't give up.  Follow-up care is a key part of your treatment and safety. Be sure to make and go to all appointments, and call your doctor if you are having problems. It's also a good idea to know your test results and keep a list of the medicines you take.  How can you care for yourself at home?  Work with your doctor to come up with a bladder training program that is right for you. You may use one or more of the following methods.  Delayed urination  In the beginning, try to keep from urinating for 5 minutes after you first feel the need to go.  While you wait, take deep, slow breaths to relax. Kegel exercises can also help you delay the need to go to the bathroom.  After some practice, when you can easily wait 5 minutes to urinate, try to wait 10 minutes before you urinate.  Slowly increase the waiting period until you are able to control when you have to urinate.  Scheduled urination  Empty your bladder when you first wake up in the morning.  Schedule times throughout the day when you will urinate.  Start by going to the bathroom every hour, even if you don't need to go.  Slowly increase the time between trips to the bathroom.  When you have found a schedule that works well for you, keep doing it.  If you wake up during the night and have to urinate, do it. Apply your schedule to waking hours only.  Kegel exercises  These tighten and strengthen pelvic muscles, which can help you control the flow of urine. (If doing these exercises causes pain, stop doing them and talk with your doctor.) To do Kegel exercises:  Squeeze your muscles as if you were trying not to pass gas. Or squeeze your muscles as if you were stopping the flow of urine. Your belly, legs, and buttocks shouldn't move.  Hold the squeeze for 3 seconds,  "then relax for 5 to 10 seconds.  Start with 3 seconds, then add 1 second each week until you are able to squeeze for 10 seconds.  Repeat the exercise 10 times a session. Do 3 to 8 sessions a day.  When should you call for help?  Watch closely for changes in your health, and be sure to contact your doctor if:    Your incontinence is getting worse.     You do not get better as expected.   Where can you learn more?  Go to https://www.[employer].net/patiented  Enter V684 in the search box to learn more about \"Bladder Training: Care Instructions.\"  Current as of: November 15, 2023               Content Version: 14.0    9440-1731 [employer].   Care instructions adapted under license by your healthcare professional. If you have questions about a medical condition or this instruction, always ask your healthcare professional. [employer] disclaims any warranty or liability for your use of this information.         "

## 2024-09-17 NOTE — RESULT ENCOUNTER NOTE
Please call and notify patient that her urine does appear infected.  I sent in nitrofurantoin to take twice daily for 5 days.

## 2024-09-17 NOTE — PROGRESS NOTES
Preventive Care Visit  [facility]  Melissa Merritt MD, Family Medicine  Sep 2024      Assessment & Plan     Encounter for Medicare annual wellness exam  Healthcare Maintenance  - Mammogram: normal 23  - PAP: done, S/P TOTAL hysterectomy  - Pelvic exam: today for lichen possibly  - Colon cancer screenin2023, repeat one year with sessile serrated polyp per 23 path DUE   - DEXA: osteopenia with high FRAX 10/18/23; will repeat  fall   - Lung cancer screening: CT 23 - repeat one year  - still smoking. Chantix didn't go well.  had CABG.   - Lipids: due, on Crestor.     Centrilobular emphysema (H)  Stable.  Minimal respiratory symptoms.  Smoking cessation would be ideal, has been stressed and harder to quit.  Will continue to monitor.    Mixed hyperlipidemia   today.  Recommend increasing Crestor to 10 mg daily.  LDL goal is less than 100.  - Comprehensive metabolic panel; Future  - Lipid Profile; Future  - rosuvastatin (CRESTOR) 10 MG tablet; Take 1 tablet (10 mg) by mouth daily.    Pulmonary nodules  Tobacco dependence (50 years, 0.75ppd)  Due for repeat low-dose CT scan.  5 x 3 mm bilobed nodule in the right middle lobe noted 2023.  - CT Chest Lung Cancer Scrn Low Dose wo; Future    Osteopenia, unspecified location  Fracture Risk Assessment Score (FRAX) indicating greater than 3% risk for hip fracture  Continues on calcium and vitamin D.  Declines medication right now but if worsening on repeat DEXA in , would be agreeable.  Discussed increasing exercise, especially weight-based exercise.    History of colonic polyps  Due now, colonoscopy last year showed multiple polyps and recommendation was to repeat at 1 year.  New order placed.  - Colonoscopy Screening  Referral; Future    Elevated fasting glucose  A1c in prediabetes range today.  Discussed diet changes and increased exercise.  - Hemoglobin A1c; Future    Female stress  "incontinence  Significant stress incontinence.  This has worsened lately, possible UTI and will treat.  Recommend consult with OB to discuss surgical options, she would be interested in this due to the severity of the stress incontinence.  - UA with Microscopic reflex to Culture - [place]; Future  - Ob/Gyn  Referral  - UA with Microscopic reflex to Culture - [place]  - Urine Culture    Lichen sclerosus  Hypopigmentation, erosions, and excoriations on exam.  Clobetasol ordered.  Reviewed application, how to apply, and how often.  Reassess at 12 weeks.  If plaques remain present, will need biopsy.  Reviewed importance of treating this to minimize risk of squamous cell cancer.  - clobetasol (TEMOVATE) 0.05 % external ointment; Apply 0.5FTU per taper to external vaginal area. Apply nightly for four weeks then Apply every other night for four weeks then Apply 2x/week for four weeks    Subareolar mass of left breast  Firmness on the left breast, 2 inches from the nipple at 3 o'clock position.  Does have prior biopsy, unclear if scar tissue.  Will proceed with diagnostic mammogram and ultrasound.  - MA Diagnostic Bilateral w/[place]; Future  - US Breast Left Limited 1-3 Quadrants; Future    Personal history of nicotine dependence  - CT Chest Lung Cancer Scrn Low Dose wo; Future    Need for prophylactic vaccination and inoculation against influenza  Flu shot given today      Nicotine/Tobacco Cessation  She reports that she has been smoking cigarettes. She started smoking about 51 years ago. She has a 38.8 pack-year smoking history. She has never used smokeless tobacco.  Nicotine/Tobacco Cessation Plan  Information offered: Patient not interested at this time      BMI  Estimated body mass index is 31.45 kg/m  as calculated from the following:    Height as of this encounter: 1.753 m (5' 9\").    Weight as of this encounter: 96.6 kg (213 lb).   Weight management plan: Discussed healthy diet and exercise " guidelines    Counseling  Appropriate preventive services were addressed with this patient via screening, questionnaire, or discussion as appropriate for fall prevention, nutrition, physical activity, Tobacco-use cessation, social engagement, weight loss and cognition.  Checklist reviewing preventive services available has been given to the patient.  Reviewed patient's diet, addressing concerns and/or questions.   She is at risk for lack of exercise and has been provided with information to increase physical activity for the benefit of her well-being.   The patient was instructed to see the dentist every 6 months.   Information on urinary incontinence and treatment options given to patient.         Subjective   Delores is a 69 year old, presenting for the following:  Physical        2024     9:41 AM   Additional Questions   Roomed by April   Accompanied by self         2024     9:41 AM   Patient Reported Additional Medications   Patient reports taking the following new medications none       Health Care Directive  Patient does not have a Health Care Directive or Living Will: Discussed advance care planning with patient; however, patient declined at this time.    HPI  Healthcare Maintenance  - Mammogram: normal 23  - PAP: done, S/P TOTAL hysterectomy  - Pelvic exam: today for lichen possibly  - Colon cancer screenin2023, repeat one year with sessile serrated polyp per 23 path DUE   - DEXA: osteopenia with high FRAX 10/18/23; will repeat  fall   - Lung cancer screening: CT 23 - repeat one year  - still smoking. Chantix didn't go well.  had CABG.   - Lipids: due, on Crestor.       The 10-year ASCVD risk score (Theresa RANGEL, et al., 2019) is: 16.9%    Values used to calculate the score:      Age: 69 years      Sex: Female      Is Non- : No      Diabetic: No      Tobacco smoker: Yes      Systolic Blood Pressure: 134 mmHg      Is BP treated: No      HDL  "Cholesterol: 55 mg/dL      Total Cholesterol: 221 mg/dL      Vaginal symptoms improved with cream. Flares more when she is wet/more leakage. Will have significant stress incontinence. Will also seem like she wipes stool from her vaginal area at times. No purulent discharge.       Hyperlipidemia Follow-Up    Are you regularly taking any medication or supplement to lower your cholesterol?   Yes- crestor  Are you having muscle aches or other side effects that you think could be caused by your cholesterol lowering medication?  No    COPD Follow-Up  Overall, how are your COPD symptoms since your last clinic visit?  No change  How much fatigue or shortness of breath do you have when you are walking?  Same as usual  How much shortness of breath do you have when you are resting?  None  How often do you cough? Sometimes  Have you noticed any change in your sputum/phlegm?  No  Have you experienced a recent fever? No  Please describe how far you can walk without stopping to rest:  2-5 blocks  How many flights of stairs are you able to walk up without stopping?  2  Have you had any Emergency Room Visits, Urgent Care Visits, or Hospital Admissions because of your COPD since your last office visit?  No    History   Smoking Status    Every Day    Types: Cigarettes   Smokeless Tobacco    Never     No results found for: \"FEV1\", \"WED5GNH\"      9/12/2024   General Health   How would you rate your overall physical health? Good   Feel stress (tense, anxious, or unable to sleep) Not at all            9/12/2024   Nutrition   Diet: Regular (no restrictions)            9/12/2024   Exercise   Days per week of moderate/strenous exercise 2 days   Average minutes spent exercising at this level 20 min      (!) EXERCISE CONCERN      9/12/2024   Social Factors   Frequency of gathering with friends or relatives Twice a week   Worry food won't last until get money to buy more No   Food not last or not have enough money for food? No   Do you have " housing? (Housing is defined as stable permanent housing and does not include staying ouside in a car, in a tent, in an abandoned building, in an overnight shelter, or couch-surfing.) Yes   Are you worried about losing your housing? No   Lack of transportation? No   Unable to get utilities (heat,electricity)? No            9/12/2024   Fall Risk   Fallen 2 or more times in the past year? No    No   Trouble with walking or balance? No    No       Multiple values from one day are sorted in reverse-chronological order          9/12/2024   Activities of Daily Living- Home Safety   Needs help with the following daily activites None of the above   Safety concerns in the home None of the above            9/12/2024   Dental   Dentist two times every year? (!) NO            9/12/2024   Hearing Screening   Hearing concerns? None of the above            9/12/2024   Driving Risk Screening   Patient/family members have concerns about driving No            9/12/2024   General Alertness/Fatigue Screening   Have you been more tired than usual lately? No            9/12/2024   Urinary Incontinence Screening   Bothered by leaking urine in past 6 months Yes            9/12/2024   TB Screening   Were you born outside of the [place]? No          Today's PHQ-2 Score:       4/25/2024     7:40 AM   PHQ-2 ( 1999 Pf[employer])   Q1: Little interest or pleasure in doing things 0   Q2: Feeling down, depressed or hopeless 0   PHQ-2 Score 0   Q1: Little interest or pleasure in doing things Not at all   Q2: Feeling down, depressed or hopeless Not at all   PHQ-2 Score 0         9/12/2024   Substance Use   Alcohol more than 3/day or more than 7/wk Not Applicable   Do you have a current opioid prescription? No   How severe/bad is pain from 1 to 10? 0/10 (No Pain)   Do you use any other substances recreationally? No        Social History     Tobacco Use    Smoking status: Every Day     Current packs/day: 0.75     Average packs/day: 0.8 packs/day for 51.7 years  (38.8 ttl pk-yrs)     Types: Cigarettes     Start date: 1973    Smokeless tobacco: Never    Tobacco comments:     50 years   Vaping Use    Vaping status: Never Used   Substance Use Topics    Alcohol use: No    Drug use: No           8/24/2023   LAST FHS-7 RESULTS   1st degree relative breast or ovarian cancer Yes   Any relative bilateral breast cancer Unknown   Any male have breast cancer No   Any ONE woman have BOTH breast AND ovarian cancer No   Any woman with breast cancer before 50yrs No   2 or more relatives with breast AND/OR ovarian cancer No   2 or more relatives with breast AND/OR bowel cancer No        Mammogram Screening - Mammogram every 1-2 years updated in Health Maintenance based on mutual decision making      History of abnormal Pap smear: Status post hysterectomy with removal of cervix and no history of CIN2 or greater or cervical cancer. Health Maintenance and Surgical History updated.       ASCVD Risk   The 10-year ASCVD risk score (Theresa RANGEL, et al., 2019) is: 16.9%    Values used to calculate the score:      Age: 69 years      Sex: Female      Is Non- : No      Diabetic: No      Tobacco smoker: Yes      Systolic Blood Pressure: 134 mmHg      Is BP treated: No      HDL Cholesterol: 55 mg/dL      Total Cholesterol: 221 mg/dL      Reviewed and updated as needed this visit by Provider   Tobacco   Meds  Problems  Med Hx  Surg Hx  Fam Hx  Soc Hx Sexual   Activity            Current providers sharing in care for this patient include:  Patient Care Team:  Melissa Merritt MD as PCP - General (Family Medicine)  Ramez Corona MD as Assigned Surgical Provider  Melissa Merritt MD as Assigned PCP    The following health maintenance items are reviewed in Epic and correct as of today:  Health Maintenance   Topic Date Due    COPD ACTION PLAN  Never done    HEPATITIS C SCREENING  Never done    ZOSTER IMMUNIZATION (1 of 2) Never done    RSV VACCINE (1 - Risk  "60-74 years 1-dose series) Never done    COLORECTAL CANCER SCREENING  06/22/2024    LUNG CANCER SCREENING  06/30/2024    COVID-19 Vaccine (5 - 2024-25 season) 09/01/2024    NICOTINE/TOBACCO CESSATION COUNSELING Q 1 YR  08/31/2024    MAMMO SCREENING  06/30/2025    MEDICARE ANNUAL WELLNESS VISIT  09/17/2025    LIPID  09/17/2025    FALL RISK ASSESSMENT  09/17/2025    DEXA  10/18/2025    GLUCOSE  09/17/2027    ADVANCE CARE PLANNING  09/17/2029    DTAP/TDAP/TD IMMUNIZATION (2 - Td or Tdap) 05/15/2033    SPIROMETRY  Completed    PHQ-2 (once per calendar year)  Completed    INFLUENZA VACCINE  Completed    Pneumococcal Vaccine: 65+ Years  Completed    HPV IMMUNIZATION  Aged Out    MENINGITIS IMMUNIZATION  Aged Out    RSV MONOCLONAL ANTIBODY  Aged Out        Objective    Exam  /80 (BP Location: Right arm, Patient Position: Sitting, Cuff Size: Adult Regular)   Pulse 105   Temp 98.8  F (37.1  C) (Tympanic)   Resp 18   Ht 1.753 m (5' 9\")   Wt 96.6 kg (213 lb)   SpO2 94%   BMI 31.45 kg/m     Estimated body mass index is 31.45 kg/m  as calculated from the following:    Height as of this encounter: 1.753 m (5' 9\").    Weight as of this encounter: 96.6 kg (213 lb).    Physical Exam  Constitutional:       General: She is not in acute distress.     Appearance: Normal appearance. She is well-developed. She is not ill-appearing.   HENT:      Head: Normocephalic and atraumatic.      Right Ear: Tympanic membrane and external ear normal.      Left Ear: Tympanic membrane and external ear normal.      Nose: Nose normal.      Mouth/Throat:      Mouth: Mucous membranes are moist.      Pharynx: No oropharyngeal exudate.   Eyes:      Extraocular Movements: Extraocular movements intact.      Conjunctiva/sclera: Conjunctivae normal.   Neck:      Thyroid: No thyroid mass, thyromegaly or thyroid tenderness.   Cardiovascular:      Rate and Rhythm: Normal rate and regular rhythm.      Pulses: Normal pulses.      Heart sounds: Normal " heart sounds, S1 normal and S2 normal. No murmur heard.  Pulmonary:      Effort: Pulmonary effort is normal. No respiratory distress.      Breath sounds: Normal breath sounds. No wheezing, rhonchi or rales.   Chest:   Breasts:     Right: Normal. No inverted nipple, mass, skin change or tenderness.      Left: Mass present. No inverted nipple, skin change or tenderness.          Comments: Area of firmness noted on photo   Abdominal:      General: Bowel sounds are normal. There is no abdominal bruit.      Palpations: Abdomen is soft. There is no mass or pulsatile mass.      Tenderness: There is no abdominal tenderness.   Genitourinary:     Vagina: No vaginal discharge, tenderness or lesions.          Comments: Significant hypopigmentation, excoriation and large plaque noted above   No stool or discharge noted on pelvic. No evidence for fistula.   Does have large amount of urine leakage with cough   Musculoskeletal:         General: Normal range of motion.      Cervical back: Neck supple.      Right lower leg: No edema.      Left lower leg: No edema.   Lymphadenopathy:      Cervical: No cervical adenopathy.      Upper Body:      Right upper body: No supraclavicular or axillary adenopathy.      Left upper body: No supraclavicular or axillary adenopathy.   Skin:     General: Skin is warm and dry.      Capillary Refill: Capillary refill takes less than 2 seconds.      Findings: No rash.   Neurological:      Mental Status: She is alert and oriented to person, place, and time.      Gait: Gait is intact.   Psychiatric:         Attention and Perception: Attention normal.         Mood and Affect: Mood normal.         Speech: Speech normal.         Behavior: Behavior normal.         Thought Content: Thought content normal.         Cognition and Memory: Cognition normal.         Judgment: Judgment normal.                9/17/2024   Mini Cog   Clock Draw Score 2 Normal   3 Item Recall 3 objects recalled   Mini Cog Total Score 5           Results for orders placed or performed in visit on 09/17/24   UA with Microscopic reflex to Culture - [facility]     Status: Abnormal    Specimen: Urine, Clean Catch   Result Value Ref Range    Color Urine Yellow Colorless, Straw, Light Yellow, Yellow    Appearance Urine Slightly Cloudy (A) Clear    Glucose Urine Negative Negative mg/dL    Bilirubin Urine Negative Negative    Ketones Urine Negative Negative mg/dL    Specific Gravity Urine 1.024 1.003 - 1.035    Blood Urine Negative Negative    pH Urine 6.5 4.7 - 8.0    Protein Albumin Urine 10 (A) Negative mg/dL    Urobilinogen Urine Normal Normal, 2.0 mg/dL    Nitrite Urine Positive (A) Negative    Leukocyte Esterase Urine Moderate (A) Negative    Bacteria Urine Many (A) None Seen /HPF    Mucus Urine Present (A) None Seen /LPF    RBC Urine 5 (H) <=2 /HPF    WBC Urine 21 (H) <=5 /HPF    Squamous Epithelials Urine 0 <=1 /HPF    Hyaline Casts Urine 5 (H) <=2 /LPF    Narrative    Urine Culture ordered based on laboratory criteria   Results for orders placed or performed in visit on 09/17/24   Comprehensive metabolic panel     Status: Abnormal   Result Value Ref Range    Sodium 140 135 - 145 mmol/L    Potassium 4.2 3.4 - 5.3 mmol/L    Carbon Dioxide (CO2) 23 22 - 29 mmol/L    Anion Gap 13 7 - 15 mmol/L    Urea Nitrogen 18.4 8.0 - 23.0 mg/dL    Creatinine 0.87 0.51 - 0.95 mg/dL    GFR Estimate 71 >60 mL/min/1.73m2    Calcium 9.5 8.8 - 10.4 mg/dL    Chloride 104 98 - 107 mmol/L    Glucose 118 (H) 70 - 99 mg/dL    Alkaline Phosphatase 118 40 - 150 U/L    AST 17 0 - 45 U/L    ALT 20 0 - 50 U/L    Protein Total 7.3 6.4 - 8.3 g/dL    Albumin 4.3 3.5 - 5.2 g/dL    Bilirubin Total 0.3 <=1.2 mg/dL    Patient Fasting > 8hrs? Yes    Lipid Profile     Status: Abnormal   Result Value Ref Range    Cholesterol 221 (H) <200 mg/dL    Triglycerides 244 (H) <150 mg/dL    Direct Measure HDL 55 >=50 mg/dL    LDL Cholesterol Calculated 117 (H) <100 mg/dL    Non HDL Cholesterol 166 (H)  <130 mg/dL    Patient Fasting > 8hrs? Yes     Narrative    Cholesterol  Desirable: < 200 mg/dL  Borderline High: 200 - 239 mg/dL  High: >= 240 mg/dL    Triglycerides  Normal: < 150 mg/dL  Borderline High: 150 - 199 mg/dL  High: 200-499 mg/dL  Very High: >= 500 mg/dL    Direct Measure HDL  Female: >= 50 mg/dL   Male: >= 40 mg/dL    LDL Cholesterol  Desirable: < 100 mg/dL  Above Desirable: 100 - 129 mg/dL   Borderline High: 130 - 159 mg/dL   High:  160 - 189 mg/dL   Very High: >= 190 mg/dL    Non HDL Cholesterol  Desirable: < 130 mg/dL  Above Desirable: 130 - 159 mg/dL  Borderline High: 160 - 189 mg/dL  High: 190 - 219 mg/dL  Very High: >= 220 mg/dL   CBC with platelets and differential     Status: Abnormal   Result Value Ref Range    WBC Count 9.3 4.0 - 11.0 10e3/uL    RBC Count 5.09 3.80 - 5.20 10e6/uL    Hemoglobin 15.6 11.7 - 15.7 g/dL    Hematocrit 47.1 (H) 35.0 - 47.0 %    MCV 93 78 - 100 fL    MCH 30.6 26.5 - 33.0 pg    MCHC 33.1 31.5 - 36.5 g/dL    RDW 13.7 10.0 - 15.0 %    Platelet Count 253 150 - 450 10e3/uL    % Neutrophils 69 %    % Lymphocytes 19 %    % Monocytes 9 %    % Eosinophils 2 %    % Basophils 1 %    % Immature Granulocytes 0 %    NRBCs per 100 WBC 0 <1 /100    Absolute Neutrophils 6.4 1.6 - 8.3 10e3/uL    Absolute Lymphocytes 1.8 0.8 - 5.3 10e3/uL    Absolute Monocytes 0.8 0.0 - 1.3 10e3/uL    Absolute Eosinophils 0.1 0.0 - 0.7 10e3/uL    Absolute Basophils 0.1 0.0 - 0.2 10e3/uL    Absolute Immature Granulocytes 0.0 <=0.4 10e3/uL    Absolute NRBCs 0.0 10e3/uL   Hemoglobin A1c     Status: Abnormal   Result Value Ref Range    Estimated Average Glucose 126 mg/dL    Hemoglobin A1C 6.0 (H) <5.7 %   CBC with Platelets & Differential     Status: Abnormal    Narrative    The following orders were created for panel order CBC with Platelets & Differential.  Procedure                               Abnormality         Status                     ---------                               -----------          ------                     CBC with platelets and d...[[id number]]  Abnormal            Final result                 Please view results for these tests on the individual orders.            Signed Electronically by: Melissa Mreritt MD

## 2024-09-18 ENCOUNTER — TELEPHONE (OUTPATIENT)
Dept: FAMILY MEDICINE | Facility: OTHER | Age: 69
End: 2024-09-18

## 2024-09-18 DIAGNOSIS — Z12.11 COLON CANCER SCREENING: Primary | ICD-10-CM

## 2024-09-18 NOTE — TELEPHONE ENCOUNTER
Screening Questions for the Scheduling of Screening Colonoscopies     (If Colonoscopy is diagnostic, Provider should review the chart before scheduling.)    Are you younger than 50 or older than 80?  No    Do you take aspirin or fish oil?  No (if yes, tell patient to stop 1 week prior to Colonoscopy)    Do you take warfarin (Coumadin), clopidogrel (Plavix), apixaban (Eliquis), dabigatram (Pradaxa), rivaroxaban (Xarelto) or any blood thinner? No    Do you use oxygen at home?  No    Do you have kidney disease? No    Are you on dialysis? No    Have you had a stroke or heart attack in the last year? No    Have you had a stent in your heart or any blood vessel in the last year? No    Have you had a transplant of any organ?  No    Have you had a colonoscopy or upper endoscopy (EGD) before?  YES. Date-.         Date of scheduled Colonoscopy: 10/22/24    Provider: Dr Owusu    [facility]

## 2024-09-19 LAB — BACTERIA UR CULT: ABNORMAL

## 2024-09-19 RX ORDER — BISACODYL 5 MG/1
10 TABLET, DELAYED RELEASE ORAL ONCE
Qty: 2 TABLET | Refills: 0 | Status: SHIPPED | OUTPATIENT
Start: 2024-09-19 | End: 2024-09-19

## 2024-09-19 NOTE — TELEPHONE ENCOUNTER
Patient scheduled for screening colonoscopy on 10/22/24.  with Dr. Francoise Ugarte bowel preparation and script  sent to [facility].   Patient does not need a pre-op. Guide to your Colonoscopy or Upper GI Endoscopy and prep instructions sent to patient via [facility] Mail.

## 2024-09-24 ENCOUNTER — PATIENT OUTREACH (OUTPATIENT)
Dept: GASTROENTEROLOGY | Facility: CLINIC | Age: 69
End: 2024-09-24

## 2024-09-27 ENCOUNTER — HOSPITAL ENCOUNTER (OUTPATIENT)
Dept: CT IMAGING | Facility: HOSPITAL | Age: 69
Discharge: HOME | End: 2024-09-27
Attending: STUDENT IN AN ORGANIZED HEALTH CARE EDUCATION/TRAINING PROGRAM
Payer: MEDICARE

## 2024-09-27 ENCOUNTER — HOSPITAL ENCOUNTER (OUTPATIENT)
Dept: ULTRASOUND IMAGING | Facility: HOSPITAL | Age: 69
Discharge: HOME | End: 2024-09-27
Attending: STUDENT IN AN ORGANIZED HEALTH CARE EDUCATION/TRAINING PROGRAM
Payer: MEDICARE

## 2024-09-27 ENCOUNTER — HOSPITAL ENCOUNTER (OUTPATIENT)
Dept: MAMMOGRAPHY | Facility: OTHER | Age: 69
Discharge: HOME | End: 2024-09-27
Attending: STUDENT IN AN ORGANIZED HEALTH CARE EDUCATION/TRAINING PROGRAM
Payer: MEDICARE

## 2024-09-27 DIAGNOSIS — Z87.891 PERSONAL HISTORY OF NICOTINE DEPENDENCE: ICD-10-CM

## 2024-09-27 DIAGNOSIS — R91.8 PULMONARY NODULES: Chronic | ICD-10-CM

## 2024-09-27 DIAGNOSIS — F17.200 TOBACCO DEPENDENCE: Chronic | ICD-10-CM

## 2024-09-27 DIAGNOSIS — N63.42 SUBAREOLAR MASS OF LEFT BREAST: ICD-10-CM

## 2024-09-27 PROCEDURE — 76642 ULTRASOUND BREAST LIMITED: CPT | Mod: LT

## 2024-09-27 PROCEDURE — 71271 CT THORAX LUNG CANCER SCR C-: CPT

## 2024-09-27 PROCEDURE — 77062 BREAST TOMOSYNTHESIS BI: CPT | Mod: TC

## 2024-10-14 ENCOUNTER — OFFICE VISIT (OUTPATIENT)
Dept: OBGYN | Facility: OTHER | Age: 69
End: 2024-10-14
Attending: OBSTETRICS & GYNECOLOGY
Payer: COMMERCIAL

## 2024-10-14 VITALS
HEIGHT: 69 IN | SYSTOLIC BLOOD PRESSURE: 126 MMHG | WEIGHT: 213 LBS | DIASTOLIC BLOOD PRESSURE: 82 MMHG | BODY MASS INDEX: 31.55 KG/M2 | HEART RATE: 68 BPM

## 2024-10-14 DIAGNOSIS — N39.46 MIXED INCONTINENCE: Primary | ICD-10-CM

## 2024-10-14 PROCEDURE — 99203 OFFICE O/P NEW LOW 30 MIN: CPT | Performed by: OBSTETRICS & GYNECOLOGY

## 2024-10-14 PROCEDURE — G0463 HOSPITAL OUTPT CLINIC VISIT: HCPCS

## 2024-10-14 RX ORDER — MIRABEGRON 25 MG/1
25 TABLET, FILM COATED, EXTENDED RELEASE ORAL DAILY
Qty: 30 TABLET | Refills: 11 | Status: SHIPPED | OUTPATIENT
Start: 2024-10-14

## 2024-10-14 ASSESSMENT — PAIN SCALES - GENERAL: PAINLEVEL: NO PAIN (0)

## 2024-10-14 NOTE — PROGRESS NOTES
"Clinic Visit      Subjective:   Delores Mac is a 69 year old  who presents for severe urinary incontinence. \"I have no control of my bladder whatsoever.\" She changes her pads 4x a day, and often her pants, too. She plans her schedule around bathroom breaks. Her family members know to clear the way when she gets up to go to the bathroom, her urgency symptoms are so bad. When she coughs, she \"floods\". This has been affecting her life in a profound way since April.     She has not had any treatments or medications to treat this problem. She did have a hysterectomy, after which she noticed that her bladder was lower. She now is on regular treatment for lichen sclerosis, which makes the skin of her bottom more resilient, but that has been an ongoing related problem as well.    Patient Active Problem List   Diagnosis    Tobacco dependence (50 years, 0.75ppd)    Elevated blood pressure reading    S/P total hysterectomy    Mixed hyperlipidemia    Pulmonary nodules    Centrilobular emphysema (H)    Osteopenia    Fracture Risk Assessment Score (FRAX) indicating greater than 3% risk for hip fracture    Prediabetes    History of colonic polyps    Female stress incontinence    Lichen sclerosus    Subareolar mass of left breast       Past Medical History:   Diagnosis Date    Abdominal cramping 05/15/2023    Centrilobular emphysema (H) 10/04/2023    Closed 3-part fracture of proximal end of left humerus with malunion 10/05/2018    Hypertension 5-    Mixed hyperlipidemia     Shoulder stiffness, left 2018       Past Surgical History:   Procedure Laterality Date    BREAST LUMPECTOMY, RT/LT Left 1999    Biopsy results Bartholin cyst.     COLONOSCOPY N/A 2023    rpt 2 years; Procedure: COLONOSCOPY with polypectomy;  Surgeon: Ramez Corona MD;  Location: [facility] OR    HYSTERECTOMY TOTAL ABDOMINAL, BILATERAL SALPINGO-OOPHORECTOMY, COMBINED Bilateral 1999    Performed at [facility]" [facility] by Dr. LAURA Michael for fibroid uterus.     LAPAROSCOPIC CHOLECYSTECTOMY N/A 8/21/2023    Procedure: Laparoscopic Cholecystectomy;  Surgeon: Ramez Corona MD;  Location: [facility] OR    TOE SURGERY Bilateral     end of toes cut off from bone infection. had chronic ingrown nails.       Social History     Tobacco Use    Smoking status: Every Day     Current packs/day: 0.75     Average packs/day: 0.8 packs/day for 51.8 years (38.8 ttl pk-yrs)     Types: Cigarettes     Start date: 1973    Smokeless tobacco: Never    Tobacco comments:     50 years   Substance Use Topics    Alcohol use: No       Family History   Problem Relation Age of Onset    Breast Cancer Mother         age unknown, 1 breast    Thyroid Disease Mother     Lung Cancer Father         mesothelioma    Hashimoto's thyroiditis Sister     Aneurysm Maternal Grandmother     Bone Cancer Maternal Grandfather     Heart Disease Brother 72          No Known Allergies    Current Outpatient Medications   Medication Sig Dispense Refill    Calcium Carbonate (CALCIUM 600 PO) Take 1 tablet by mouth daily      clobetasol (TEMOVATE) 0.05 % external ointment Apply 0.5FTU per taper to external vaginal area. Apply nightly for four weeks then Apply every other night for four weeks then Apply 2x/week for four weeks 30 g 0    Cyanocobalamin 5000 MCG SUBL Place 1 tablet under the tongue daily      diphenhydrAMINE-APAP, sleep, (TYLENOL PM EXTRA STRENGTH PO) Take 2 tablets by mouth daily      glucosamine-chondroitinoitin 750-600 MG TABS Take 1 tablet by mouth 2 times daily      Multiple Vitamins-Minerals (PRESERVISION AREDS PO) Take 1 tablet by mouth 2 times daily.      rosuvastatin (CRESTOR) 10 MG tablet Take 1 tablet (10 mg) by mouth daily. 90 tablet 3    Vitamin D3 (CHOLECALCIFEROL) 125 MCG (5000 UT) tablet Take 1 tablet by mouth 2 times daily           Review Of Systems  See HPI      Objective:  /82   Pulse 68   Ht 1.753 m (5' 9\")   Wt 96.6 kg (213 lb)   " BMI 31.45 kg/m      Exam:  Constitutional: NAD  Exam deferred today         Assessment/Plan:    1. Mixed incontinence  Her incontinence is severe and multifactorial. We discussed a few different first steps including medication, surgery, and referral to [facility]. Explained that this is a complicated problem that may take a few different types of interventions to get under control and recommended taking a stepwise approach so we understand what is working for her and what doesn't.     We will start with medication for urge incontinence now and follow up in 1 month. At that time we may consider an exam, as well as additional interventions including physical therapy, pessary, referral to [facility].     She expressed satisfaction with our visit and the hope that her symptoms will improve with treatment.        15 minutes spent on the date of the encounter doing chart review, history and exam, documentation and further activities per the note.        Jessica Farmer MD  Obstetrics/Gynecology

## 2024-10-15 ENCOUNTER — ANESTHESIA EVENT (OUTPATIENT)
Dept: SURGERY | Facility: HOSPITAL | Age: 69
End: 2024-10-15
Payer: MEDICARE

## 2024-10-15 ASSESSMENT — COPD QUESTIONNAIRES: COPD: 1

## 2024-10-15 ASSESSMENT — LIFESTYLE VARIABLES: TOBACCO_USE: 1

## 2024-10-15 NOTE — ANESTHESIA PREPROCEDURE EVALUATION
Anesthesia Pre-Procedure Evaluation    Patient: Delores Mac   MRN: [id number] : 1955        Procedure : Procedure(s):  COLONOSCOPY          Past Medical History:   Diagnosis Date     Abdominal cramping 05/15/2023     Centrilobular emphysema (H) 10/04/2023     Closed 3-part fracture of proximal end of left humerus with malunion 10/05/2018     Hypertension 5-     Mixed hyperlipidemia      Shoulder stiffness, left 2018      Past Surgical History:   Procedure Laterality Date     BREAST LUMPECTOMY, RT/LT Left 1999    Biopsy results Bartholin cyst.      COLONOSCOPY N/A 2023    rpt 2 years; Procedure: COLONOSCOPY with polypectomy;  Surgeon: Ramez Corona MD;  Location: [facility]     HYSTERECTOMY TOTAL ABDOMINAL, BILATERAL SALPINGO-OOPHORECTOMY, COMBINED Bilateral 1999    Performed at [facility] by Dr. LAURA Michael for fibroid uterus.      LAPAROSCOPIC CHOLECYSTECTOMY N/A 2023    Procedure: Laparoscopic Cholecystectomy;  Surgeon: Ramez Corona MD;  Location: [facility]     TOE SURGERY Bilateral     end of toes cut off from bone infection. had chronic ingrown nails.      No Known Allergies   Social History     Tobacco Use     Smoking status: Every Day     Current packs/day: 0.75     Average packs/day: 0.8 packs/day for 51.8 years (38.8 ttl pk-yrs)     Types: Cigarettes     Start date:      Smokeless tobacco: Never     Tobacco comments:     50 years   Substance Use Topics     Alcohol use: No      Wt Readings from Last 1 Encounters:   10/14/24 96.6 kg (213 lb)        Anesthesia Evaluation   Pt has had prior anesthetic. Type: MAC and General.    No history of anesthetic complications       ROS/MED HX  ENT/Pulmonary: Comment:   Crackles in bases per HP  CXR negative emphysematous blebs noted  Pulmonary nodules    (+)                tobacco use, Current use,   patient smoked within 24 hours,      COPD,              Neurologic:  - neg neurologic ROS  "    Cardiovascular:     (+) Dyslipidemia hypertension- -   -  - -                                 Previous cardiac testing   Echo: Date: Results:    Stress Test:  Date: Results:    ECG Reviewed:  Date: 6/2023 Results:  SR  Cath:  Date: Results:      METS/Exercise Tolerance: >4 METS    Hematologic:  - neg hematologic  ROS     Musculoskeletal:  - neg musculoskeletal ROS     GI/Hepatic:     (+)        bowel prep,            Renal/Genitourinary:  - neg Renal ROS     Endo:     (+)               Obesity,       Psychiatric/Substance Use:  - neg psychiatric ROS     Infectious Disease:  - neg infectious disease ROS     Malignancy:  - neg malignancy ROS     Other:  - neg other ROS          Physical Exam    Airway        Mallampati: I   TM distance: > 3 FB   Neck ROM: full   Mouth opening: > 3 cm    Respiratory Devices and Support         Dental       (+) Modest Abnormalities - crowns, retainers, 1 or 2 missing teeth    B=Bridge, C=Chipped, L=Loose, M=Missing    Cardiovascular          Rhythm and rate: regular and normal     Pulmonary           breath sounds clear to auscultation       OUTSIDE LABS:  CBC:   Lab Results   Component Value Date    WBC 9.3 09/17/2024    WBC 10.3 08/16/2023    HGB 15.6 09/17/2024    HGB 14.2 08/16/2023    HCT 47.1 (H) 09/17/2024    HCT 43.5 08/16/2023     09/17/2024     08/16/2023     BMP:   Lab Results   Component Value Date     09/17/2024     08/16/2023    POTASSIUM 4.2 09/17/2024    POTASSIUM 4.1 08/16/2023    CHLORIDE 104 09/17/2024    CHLORIDE 104 08/16/2023    CO2 23 09/17/2024    CO2 26 08/16/2023    BUN 18.4 09/17/2024    BUN 17.2 08/16/2023    CR 0.87 09/17/2024    CR 0.76 08/16/2023     (H) 09/17/2024     (H) 08/16/2023     COAGS:   Lab Results   Component Value Date    PTT 29 06/15/2023    INR 0.94 06/15/2023     POC: No results found for: \"BGM\", \"HCG\", \"HCGS\"  HEPATIC:   Lab Results   Component Value Date    ALBUMIN 4.3 09/17/2024    PROTTOTAL 7.3 " "09/17/2024    ALT 20 09/17/2024    AST 17 09/17/2024    GGT 64 (H) 05/15/2023    ALKPHOS 118 09/17/2024    BILITOTAL 0.3 09/17/2024     OTHER:   Lab Results   Component Value Date    A1C 6.0 (H) 09/17/2024    CHIOMA 9.5 09/17/2024    TSH 1.87 05/15/2023       Anesthesia Plan    ASA Status:  2    NPO Status:  NPO Appropriate    Anesthesia Type: MAC.     - Reason for MAC: straight local not clinically adequate              Consents    Anesthesia Plan(s) and associated risks, benefits, and realistic alternatives discussed. Questions answered and patient/representative(s) expressed understanding.     - Discussed: Risks, Benefits and Alternatives for BOTH SEDATION and the PROCEDURE were discussed     - Discussed with:  Patient      - Specific Concerns: Risks and benefits of MAC anesthetic discussed including dental damage, aspiration, loss of airway, conversion to general anesthetic, CV complications, MI, stroke, death. Pt wishes to proceed..     - Extended Intubation/Ventilatory Support Discussed: No.      - Patient is DNR/DNI Status: No     Use of blood products discussed: No .     Postoperative Care            Comments:    Other Comments: No HP               GRABIEL Alvarez CRNA    I have reviewed the pertinent notes and labs in the chart from the past 30 days and (re)examined the patient.  Any updates or changes from those notes are reflected in this note.                       # Obesity: Estimated body mass index is 31.45 kg/m  as calculated from the following:    Height as of 10/14/24: 1.753 m (5' 9\").    Weight as of 10/14/24: 96.6 kg (213 lb).             "

## 2024-10-15 NOTE — OR NURSING
Patient notes that she did not received a colonoscopy prep/guide; new packet made up and brought to [facility] admitting desk for patient.

## 2024-10-22 ENCOUNTER — ANESTHESIA (OUTPATIENT)
Dept: SURGERY | Facility: HOSPITAL | Age: 69
End: 2024-10-22
Payer: MEDICARE

## 2024-10-22 ENCOUNTER — HOSPITAL ENCOUNTER (OUTPATIENT)
Facility: HOSPITAL | Age: 69
Discharge: HOME | End: 2024-10-22
Attending: STUDENT IN AN ORGANIZED HEALTH CARE EDUCATION/TRAINING PROGRAM | Admitting: STUDENT IN AN ORGANIZED HEALTH CARE EDUCATION/TRAINING PROGRAM
Payer: MEDICARE

## 2024-10-22 VITALS
HEART RATE: 97 BPM | DIASTOLIC BLOOD PRESSURE: 90 MMHG | RESPIRATION RATE: 16 BRPM | BODY MASS INDEX: 31.4 KG/M2 | HEIGHT: 69 IN | SYSTOLIC BLOOD PRESSURE: 159 MMHG | OXYGEN SATURATION: 93 % | WEIGHT: 212 LBS | TEMPERATURE: 97.6 F

## 2024-10-22 DIAGNOSIS — K63.5 POLYP OF ASCENDING COLON, UNSPECIFIED TYPE: Primary | ICD-10-CM

## 2024-10-22 PROCEDURE — 45385 COLONOSCOPY W/LESION REMOVAL: CPT | Mod: PT | Performed by: STUDENT IN AN ORGANIZED HEALTH CARE EDUCATION/TRAINING PROGRAM

## 2024-10-22 PROCEDURE — 360N000075 HC SURGERY LEVEL 2, PER MIN: Performed by: STUDENT IN AN ORGANIZED HEALTH CARE EDUCATION/TRAINING PROGRAM

## 2024-10-22 PROCEDURE — 258N000003 HC RX IP 258 OP 636: Performed by: NURSE ANESTHETIST, CERTIFIED REGISTERED

## 2024-10-22 PROCEDURE — 250N000011 HC RX IP 250 OP 636: Performed by: NURSE ANESTHETIST, CERTIFIED REGISTERED

## 2024-10-22 PROCEDURE — 710N000012 HC RECOVERY PHASE 2, PER MINUTE: Performed by: STUDENT IN AN ORGANIZED HEALTH CARE EDUCATION/TRAINING PROGRAM

## 2024-10-22 PROCEDURE — 999N000141 HC STATISTIC PRE-PROCEDURE NURSING ASSESSMENT: Performed by: STUDENT IN AN ORGANIZED HEALTH CARE EDUCATION/TRAINING PROGRAM

## 2024-10-22 PROCEDURE — 370N000017 HC ANESTHESIA TECHNICAL FEE, PER MIN: Performed by: STUDENT IN AN ORGANIZED HEALTH CARE EDUCATION/TRAINING PROGRAM

## 2024-10-22 PROCEDURE — 45380 COLONOSCOPY AND BIOPSY: CPT | Mod: PT | Performed by: STUDENT IN AN ORGANIZED HEALTH CARE EDUCATION/TRAINING PROGRAM

## 2024-10-22 PROCEDURE — 272N000001 HC OR GENERAL SUPPLY STERILE: Performed by: STUDENT IN AN ORGANIZED HEALTH CARE EDUCATION/TRAINING PROGRAM

## 2024-10-22 PROCEDURE — 88305 TISSUE EXAM BY PATHOLOGIST: CPT | Mod: TC | Performed by: STUDENT IN AN ORGANIZED HEALTH CARE EDUCATION/TRAINING PROGRAM

## 2024-10-22 PROCEDURE — 88305 TISSUE EXAM BY PATHOLOGIST: CPT | Mod: 26 | Performed by: PATHOLOGY

## 2024-10-22 PROCEDURE — 45385 COLONOSCOPY W/LESION REMOVAL: CPT | Performed by: NURSE ANESTHETIST, CERTIFIED REGISTERED

## 2024-10-22 PROCEDURE — 250N000009 HC RX 250: Performed by: NURSE ANESTHETIST, CERTIFIED REGISTERED

## 2024-10-22 PROCEDURE — 45390 COLONOSCOPY W/RESECTION: CPT | Mod: PT | Performed by: STUDENT IN AN ORGANIZED HEALTH CARE EDUCATION/TRAINING PROGRAM

## 2024-10-22 RX ORDER — NALOXONE HYDROCHLORIDE 0.4 MG/ML
0.1 INJECTION, SOLUTION INTRAMUSCULAR; INTRAVENOUS; SUBCUTANEOUS
Status: DISCONTINUED | OUTPATIENT
Start: 2024-10-22 | End: 2024-10-22 | Stop reason: HOSPADM

## 2024-10-22 RX ORDER — LIDOCAINE HYDROCHLORIDE 20 MG/ML
INJECTION, SOLUTION INFILTRATION; PERINEURAL PRN
Status: DISCONTINUED | OUTPATIENT
Start: 2024-10-22 | End: 2024-10-22

## 2024-10-22 RX ORDER — ONDANSETRON 2 MG/ML
4 INJECTION INTRAMUSCULAR; INTRAVENOUS EVERY 30 MIN PRN
Status: DISCONTINUED | OUTPATIENT
Start: 2024-10-22 | End: 2024-10-22 | Stop reason: HOSPADM

## 2024-10-22 RX ORDER — LIDOCAINE 40 MG/G
CREAM TOPICAL
Status: DISCONTINUED | OUTPATIENT
Start: 2024-10-22 | End: 2024-10-22 | Stop reason: HOSPADM

## 2024-10-22 RX ORDER — OXYCODONE HYDROCHLORIDE 5 MG/1
5 TABLET ORAL
Status: DISCONTINUED | OUTPATIENT
Start: 2024-10-22 | End: 2024-10-22 | Stop reason: HOSPADM

## 2024-10-22 RX ORDER — SODIUM CHLORIDE, SODIUM LACTATE, POTASSIUM CHLORIDE, CALCIUM CHLORIDE 600; 310; 30; 20 MG/100ML; MG/100ML; MG/100ML; MG/100ML
INJECTION, SOLUTION INTRAVENOUS CONTINUOUS
Status: DISCONTINUED | OUTPATIENT
Start: 2024-10-22 | End: 2024-10-22 | Stop reason: HOSPADM

## 2024-10-22 RX ORDER — DEXAMETHASONE SODIUM PHOSPHATE 10 MG/ML
4 INJECTION, SOLUTION INTRAMUSCULAR; INTRAVENOUS
Status: DISCONTINUED | OUTPATIENT
Start: 2024-10-22 | End: 2024-10-22 | Stop reason: HOSPADM

## 2024-10-22 RX ORDER — ONDANSETRON 4 MG/1
4 TABLET, ORALLY DISINTEGRATING ORAL EVERY 30 MIN PRN
Status: DISCONTINUED | OUTPATIENT
Start: 2024-10-22 | End: 2024-10-22 | Stop reason: HOSPADM

## 2024-10-22 RX ORDER — PROPOFOL 10 MG/ML
INJECTION, EMULSION INTRAVENOUS PRN
Status: DISCONTINUED | OUTPATIENT
Start: 2024-10-22 | End: 2024-10-22

## 2024-10-22 RX ORDER — OXYCODONE HYDROCHLORIDE 5 MG/1
10 TABLET ORAL
Status: DISCONTINUED | OUTPATIENT
Start: 2024-10-22 | End: 2024-10-22 | Stop reason: HOSPADM

## 2024-10-22 RX ADMIN — PROPOFOL 50 MG: 10 INJECTION, EMULSION INTRAVENOUS at 07:24

## 2024-10-22 RX ADMIN — PROPOFOL 50 MG: 10 INJECTION, EMULSION INTRAVENOUS at 09:04

## 2024-10-22 RX ADMIN — PROPOFOL 50 MG: 10 INJECTION, EMULSION INTRAVENOUS at 09:26

## 2024-10-22 RX ADMIN — PROPOFOL 75 MG: 10 INJECTION, EMULSION INTRAVENOUS at 09:38

## 2024-10-22 RX ADMIN — PROPOFOL 50 MG: 10 INJECTION, EMULSION INTRAVENOUS at 07:53

## 2024-10-22 RX ADMIN — SODIUM CHLORIDE, POTASSIUM CHLORIDE, SODIUM LACTATE AND CALCIUM CHLORIDE: 600; 310; 30; 20 INJECTION, SOLUTION INTRAVENOUS at 07:21

## 2024-10-22 RX ADMIN — PROPOFOL 50 MG: 10 INJECTION, EMULSION INTRAVENOUS at 08:39

## 2024-10-22 RX ADMIN — PROPOFOL 50 MG: 10 INJECTION, EMULSION INTRAVENOUS at 07:36

## 2024-10-22 RX ADMIN — PROPOFOL 75 MG: 10 INJECTION, EMULSION INTRAVENOUS at 09:36

## 2024-10-22 RX ADMIN — SODIUM CHLORIDE, POTASSIUM CHLORIDE, SODIUM LACTATE AND CALCIUM CHLORIDE: 600; 310; 30; 20 INJECTION, SOLUTION INTRAVENOUS at 09:33

## 2024-10-22 RX ADMIN — PROPOFOL 50 MG: 10 INJECTION, EMULSION INTRAVENOUS at 08:16

## 2024-10-22 RX ADMIN — LIDOCAINE HYDROCHLORIDE 40 MG: 20 INJECTION, SOLUTION INFILTRATION; PERINEURAL at 07:25

## 2024-10-22 RX ADMIN — PROPOFOL 50 MG: 10 INJECTION, EMULSION INTRAVENOUS at 08:59

## 2024-10-22 RX ADMIN — PROPOFOL 50 MG: 10 INJECTION, EMULSION INTRAVENOUS at 09:18

## 2024-10-22 RX ADMIN — PROPOFOL 200 MCG/KG/MIN: 10 INJECTION, EMULSION INTRAVENOUS at 07:25

## 2024-10-22 ASSESSMENT — ACTIVITIES OF DAILY LIVING (ADL)
ADLS_ACUITY_SCORE: 35

## 2024-10-22 NOTE — ANESTHESIA POSTPROCEDURE EVALUATION
Patient: Delores Mac    Procedure: Procedure(s):  COLONOSCOPY with polypectomy       Anesthesia Type:  MAC    Note:  Disposition: Outpatient   Postop Pain Control: Uneventful            Sign Out: Well controlled pain   PONV: No   Neuro/Psych: Uneventful            Sign Out: Acceptable/Baseline neuro status   Airway/Respiratory: Uneventful            Sign Out: Acceptable/Baseline resp. status   CV/Hemodynamics: Uneventful            Sign Out: Acceptable CV status; No obvious hypovolemia; No obvious fluid overload   Other NRE: NONE   DID A NON-ROUTINE EVENT OCCUR? No         Last vitals:  Vitals Value Taken Time   /90 10/22/24 1025   Temp 97.6  F (36.4  C) 10/22/24 1025   Pulse 97 10/22/24 1025   Resp 16 10/22/24 1025   SpO2 93 % 10/22/24 1025       Electronically Signed By: GRABIEL Daniels CRNA  October 22, 2024  12:16 PM

## 2024-10-22 NOTE — OR NURSING
Patient and responsible adult given discharge instructions with no questions regarding instructions. Kunal score 20/20. Pain level 0/10.  Discharged from unit via ambulatory, with  and daughter. Patient discharged to home, copy of AVS sent and no questions prior to discharge.

## 2024-10-22 NOTE — ANESTHESIA CARE TRANSFER NOTE
Patient: Delores Mac    Procedure: Procedure(s):  COLONOSCOPY with polypectomy       Diagnosis: Colon cancer screening [Z12.11]  Diagnosis Additional Information: No value filed.    Anesthesia Type:   MAC     Note:    Oropharynx: oropharynx clear of all foreign objects  Level of Consciousness: awake  Oxygen Supplementation: room air    Independent Airway: airway patency satisfactory and stable  Dentition: dentition unchanged  Vital Signs Stable: post-procedure vital signs reviewed and stable    Patient transferred to: Phase II    Handoff Report: Identifed the Patient, Identified the Reponsible Provider, Reviewed the pertinent medical history, Discussed the surgical course, Reviewed Intra-OP anesthesia mangement and issues during anesthesia, Set expectations for post-procedure period and Allowed opportunity for questions and acknowledgement of understanding      Vitals:  Vitals Value Taken Time   BP     Temp     Pulse     Resp     SpO2 92 % 10/22/24 0953   Vitals shown include unfiled device data.    Electronically Signed By: GRABIEL Chavis CRNA  October 22, 2024  9:53 AM

## 2024-10-22 NOTE — DISCHARGE INSTRUCTIONS
Colon Polyps: Care Instructions  Your Care Instructions     Colon polyps are growths in the colon or the rectum. The cause of most colon polyps is not known, and most people who get them do not have any problems. But a certain kind can turn into cancer. For this reason, regular testing for colon polyps is important for people as they get older. It is also important for anyone who has an increased risk for colon cancer.  Polyps are usually found through routine colon cancer screening tests. Although most colon polyps are not cancerous, they are usually removed and then tested for cancer. Screening for colon cancer saves lives because the cancer can usually be cured if it is caught early.  If you have a polyp that is the type that can turn into cancer, you may need more tests to examine your entire colon. The doctor will remove any other polyps that are found, and you will be tested more often.  Follow-up care is a key part of your treatment and safety. Be sure to make and go to all appointments, and call your doctor if you are having problems. It's also a good idea to know your test results and keep a list of the medicines you take.  How can you care for yourself at home?  Regular exams to look for colon polyps are the best way to prevent polyps from turning into colon cancer. These can include stool tests, sigmoidoscopy, colonoscopy, and CT colonography. Talk with your doctor about a testing schedule that is right for you.  To prevent polyps  There is no home treatment that can prevent colon polyps. But these steps may help lower your risk for cancer.  Stay active. Being active can help you get to and stay at a healthy weight. Try to exercise on most days of the week. Walking is a good choice.  Eat well. Choose a variety of vegetables, fruits, legumes (such as peas and beans), fish, poultry, and whole grains.  Do not smoke. If you need help quitting, talk to your doctor about stop-smoking programs and medicines.  "These can increase your chances of quitting for good.  If you drink alcohol, limit how much you drink. Limit alcohol to 2 drinks a day for men and 1 drink a day for women.  When should you call for help?   Call your doctor now or seek immediate medical care if:    You have severe belly pain.     Your stools are maroon or very bloody.   Watch closely for changes in your health, and be sure to contact your doctor if:    You have a fever.     You have nausea or vomiting.     You have a change in bowel habits (new constipation or diarrhea).     Your symptoms get worse or are not improving as expected.   Where can you learn more?  Go to https://www.[employer].net/patiented  Enter C571 in the search box to learn more about \"Colon Polyps: Care Instructions.\"  Current as of: October 19, 2023  Content Version: 14.2 2024 [employer].   Care instructions adapted under license by your healthcare professional. If you have questions about a medical condition or this instruction, always ask your healthcare professional. [employer] disclaims any warranty or liability for your use of this information.  Colonoscopy: What to Expect at Home  Your Recovery  After a colonoscopy, you'll stay at the clinic until you wake up. Then you can go home. But you'll need to arrange for a ride. Your doctor will tell you when you can eat and do your other usual activities.  Your doctor will talk to you about when you'll need your next colonoscopy. Your doctor can help you decide how often you need to be checked. This will depend on the results of your test and your risk for colorectal cancer.  After the test, you may be bloated or have gas pains. You may need to pass gas. If a biopsy was done or a polyp was removed, you may have streaks of blood in your stool (feces) for a few days. Problems such as heavy rectal bleeding may not occur until several weeks after the test. This isn't common. But it can happen after polyps are " removed.  This care sheet gives you a general idea about how long it will take for you to recover. But each person recovers at a different pace. Follow the steps below to get better as quickly as possible.  How can you care for yourself at home?  Activity    Rest when you feel tired.     You can do your normal activities when it feels okay to do so.   Diet    Follow your doctor's directions for eating.     Unless your doctor has told you not to, drink plenty of fluids. This helps to replace the fluids that were lost during the colon prep.     Do not drink alcohol.   Medicines    Your doctor will tell you if and when you can restart your medicines. You will also be given instructions about taking any new medicines.     If you stopped taking aspirin or some other blood thinner, your doctor will tell you when to start taking it again.     If polyps were removed or a biopsy was done during the test, your doctor may tell you not to take aspirin or other anti-inflammatory medicines for a few days. These include ibuprofen (Advil, Motrin) and naproxen (Aleve).   Other instructions    For your safety, do not drive or operate machinery until the medicine wears off and you can think clearly. Your doctor may tell you not to drive or operate machinery until the day after your test.     Do not sign legal documents or make major decisions until the medicine wears off and you can think clearly. The anesthesia can make it hard for you to fully understand what you are agreeing to.   Follow-up care is a key part of your treatment and safety. Be sure to make and go to all appointments, and call your doctor if you are having problems. It's also a good idea to know your test results and keep a list of the medicines you take.  When should you call for help?   Call 911 anytime you think you may need emergency care. For example, call if:    You passed out (lost consciousness).     You pass maroon or bloody stools.     You have trouble  "breathing.   Call your doctor now or seek immediate medical care if:    You have pain that does not get better after you take pain medicine.     You are sick to your stomach or cannot drink fluids.     You have new or worse belly pain.     You have blood in your stools.     You have a fever.     You cannot pass stools or gas.   Watch closely for changes in your health, and be sure to contact your doctor if you have any problems.  Where can you learn more?  Go to https://www.[employer].net/patiented  Enter E264 in the search box to learn more about \"Colonoscopy: What to Expect at Home.\"  Current as of: October 25, 2023  Content Version: 14.2 2024 [employer].   Care instructions adapted under license by your healthcare professional. If you have questions about a medical condition or this instruction, always ask your healthcare professional. [employer] disclaims any warranty or liability for your use of this information.  After Anesthesia (Sleep Medicine)  What should I do after anesthesia?  You should rest and relax for the next 24 hours. Avoid risky or difficult (strenuous) activity. A responsible adult should stay with you overnight.  Don't drive or use any heavy equipment for 24 hours. Even if you feel normal, your reactions may be affected by the sleep medicine given to you.  Don't drink alcohol or make any important decisions for 24 hours.  Slowly get back to your regular diet, as you feel able.  How should I expect to feel?  It's normal to feel dizzy, light-headed, or faint for up to a full day after anesthesia or while taking pain medicine. If this happens:   Sit down for a few minutes before standing.  Have someone help you when you get up to walk or use the bathroom.  If you have nausea (feel sick to your stomach) or vomit (throw up):   Drink clear liquids (such as apple juice, ginger ale, broth, or 7UP) until you feel better.  If you feel sick to your stomach, or you keep " vomiting for 24 hours, please call the doctor.  What else should I know?  You might have a dry mouth, sore throat, muscle aches, or trouble sleeping. These should go away after 24 hours.  Please contact your doctor if you have any other symptoms that concern you, such as fever, pain, bleeding, fluid drainage, swelling, or headache, or if it's been over 8 to 10 hours and you still aren't able to pee (urinate).  If you have a history of sleep apnea, it's very important to use your CPAP machine for the next 24 hours when you nap or sleep.   For informational purposes only. Not to replace the advice of your health care provider. Copyright   2023 [facility]. All rights reserved. Clinically reviewed by Forest Vizcaino MD. [employer] [id number] - REV 09/23.

## 2024-10-22 NOTE — OP NOTE
Delores Mac MRN# [id number]   YOB: 1955 Age: 69 year old      Date of Admission:  10/22/2024    Primary care provider: Melissa Merritt    PREOPERATIVE DIAGNOSIS:  Screening colonoscopy, hx of 10mm sessile serrated polyp in 2023       POSTOPERATIVE DIAGNOSIS:  Multiple colon polyps, sigmoid diverticulosis       PROCEDURE:  Colonoscopy         INDICATIONS:  Screening colonoscopy.       SURGEON: Jesús Owusu MD    DESCRIPTION OF PROCEDURE: Delores Mac was brought into the endoscopy suite and placed in the left lateral decubitus position. After preprocedural pause and attended monitored anesthesia was administered, the external anus was inspected and was within normal limits. Digital rectal exam was within normal limits. The colonoscope was inserted and advanced under direct visualization to the level of the cecum which was identified by the appendiceal orifice and the ileocecal valve. The prep was excellent. Upon slow withdrawal of the colonoscope, approximately 95% of the mucosa was directly visualized.     A 1.5 cm polyp was identified at the hepatic flexure.  A small portion of the polyp was removed with a snare.  Additional portions of the polyp were removed piecemeal with a cold forceps.  Due to the position of the polyp, I was unable to successfully snare it around the base.  After multiple unsuccessful attempts to snare the polyp at its base, I requested Dr. Stewart quezada and he then attempted to snare the polyp and was unsuccessful.  He performed a submucosal saline injection underneath the polyp in order to elevated however was still unable to snare the polyp following this.    An additional polyp was identified and removed with a snare at the proximal transverse colon measuring approximately 8 mm.    Three additional polyps were identified in the descending colon removed with a biopsy forceps and/or cold snare all measuring 2 to 4 mm in size.    A single 4mm sigmoid polyp was  identified and removed with a cold snare.    Seven rectal polyps were removed with a cold snare and/or biopsy forceps measuring between 2-6mm in size.    There was moderate sigmoid diverticulosis present.        The rest of the colon was without mucosal abnormality. There was no evidence of further polyps, diverticula, inflammation, bleeding or AVMs. Retroflexion of the rectum was within normal limits. The extra air was removed from the colon, and the colonoscope withdrawn. The patient tolerated the procedure well and was taken to postanesthesia care unit.     Due to the inability to completely remove the identified polyp at the splenic flexure, I discussed with the patient my recommendation for referral to interventional GI for additional scope and definitive removal.  The patient and her family were understanding of this.    Jesús Owusu MD

## 2024-10-22 NOTE — H&P
Calcium Range Pre-Op H&P    CHIEF COMPLAINT:  No chief complaint on file.      HISTORY OF PRESENT ILLNESS:  Delores Mac is a 69 year old female who is today for screening colonoscopy. Prior history of sessile serrated polyp in 2023    REVIEW OF SYSTEMS:  10 point review of symptoms completed and negative unless otherwise listed in HPI    Past Medical History:   Diagnosis Date    Abdominal cramping 05/15/2023    Centrilobular emphysema (H) 10/04/2023    Closed 3-part fracture of proximal end of left humerus with malunion 10/05/2018    Hypertension 5-    Mixed hyperlipidemia     Shoulder stiffness, left 12/20/2018       Past Surgical History:   Procedure Laterality Date    BREAST LUMPECTOMY, RT/LT Left 05/04/1999    Biopsy results Bartholin cyst.     COLONOSCOPY N/A 06/22/2023    rpt 2 years; Procedure: COLONOSCOPY with polypectomy;  Surgeon: Ramez Corona MD;  Location: [facility]    HYSTERECTOMY TOTAL ABDOMINAL, BILATERAL SALPINGO-OOPHORECTOMY, COMBINED Bilateral 05/04/1999    Performed at [facility] by Dr. LAURA Michael for fibroid uterus.     LAPAROSCOPIC CHOLECYSTECTOMY N/A 8/21/2023    Procedure: Laparoscopic Cholecystectomy;  Surgeon: Ramez Corona MD;  Location: [facility]    TOE SURGERY Bilateral     end of toes cut off from bone infection. had chronic ingrown nails.       Family History   Problem Relation Age of Onset    Breast Cancer Mother         age unknown, 1 breast    Thyroid Disease Mother     Lung Cancer Father         mesothelioma    Hashimoto's thyroiditis Sister     Aneurysm Maternal Grandmother     Bone Cancer Maternal Grandfather     Heart Disease Brother 72       Social History     Tobacco Use    Smoking status: Every Day     Current packs/day: 0.75     Average packs/day: 0.8 packs/day for 51.8 years (38.9 ttl pk-yrs)     Types: Cigarettes     Start date: 1973    Smokeless tobacco: Never    Tobacco comments:     50 years   Substance Use Topics    Alcohol  "use: No       Patient Active Problem List   Diagnosis    Tobacco dependence (50 years, 0.75ppd)    Elevated blood pressure reading    S/P total hysterectomy    Mixed hyperlipidemia    Pulmonary nodules    Centrilobular emphysema (H)    Osteopenia    Fracture Risk Assessment Score (FRAX) indicating greater than 3% risk for hip fracture    Prediabetes    History of colonic polyps    Female stress incontinence    Lichen sclerosus    Subareolar mass of left breast       No Known Allergies    No current outpatient medications on file.       Vitals: BP (!) 148/101   Pulse 109   Temp 98.3  F (36.8  C) (Tympanic)   Resp 18   Ht 1.753 m (5' 9\")   Wt 96.2 kg (212 lb)   SpO2 94%   BMI 31.31 kg/m    BMI= Body mass index is 31.31 kg/m .    EXAM:  General: Vital signs reviewed, in no apparent distress  Eyes: Anicteric  HENT: Normocephalic, atraumatic, trachea midline   Respiratory: Breathing nonlabored, no wheezing, rhonchi or rales on bilateral anterior ausculation  Cardiovascular: Regular rate and rhythm, no murmurs, rubs or gallops  GI: Abdomen soft, nondistended, nontender, no organomegaly  Musculoskeletal: No gross deformities  Neurologic: Grossly nonfocal exam  Psychiatric: Normal mood, affect and insight  Integumentary: Warm and dry      ASSESSMENT:  Delores Mac is a 69 year old who presents for a screening colonoscopy. Hx of HTN, HLD.      PLAN:  Proceed with  colonoscopy.            Jesús Owusu MD          "

## 2024-11-08 ENCOUNTER — MYC MEDICAL ADVICE (OUTPATIENT)
Dept: FAMILY MEDICINE | Facility: OTHER | Age: 69
End: 2024-11-08

## 2024-11-13 ENCOUNTER — OFFICE VISIT (OUTPATIENT)
Dept: OBGYN | Facility: OTHER | Age: 69
End: 2024-11-13
Attending: OBSTETRICS & GYNECOLOGY
Payer: MEDICARE

## 2024-11-13 VITALS
BODY MASS INDEX: 31.4 KG/M2 | RESPIRATION RATE: 16 BRPM | OXYGEN SATURATION: 97 % | SYSTOLIC BLOOD PRESSURE: 130 MMHG | WEIGHT: 212 LBS | HEART RATE: 108 BPM | HEIGHT: 69 IN | DIASTOLIC BLOOD PRESSURE: 82 MMHG

## 2024-11-13 DIAGNOSIS — N39.46 MIXED INCONTINENCE: Primary | ICD-10-CM

## 2024-11-13 PROCEDURE — G0463 HOSPITAL OUTPT CLINIC VISIT: HCPCS

## 2024-11-13 RX ORDER — OXYBUTYNIN CHLORIDE 5 MG/1
5 TABLET, EXTENDED RELEASE ORAL DAILY
Qty: 30 TABLET | Refills: 11 | Status: SHIPPED | OUTPATIENT
Start: 2024-11-13

## 2024-11-13 NOTE — PROGRESS NOTES
Clinic Visit      Subjective:   Delores Mac is a 69 year old  who presents in follow up for mixed incontinence. We started myrbetriq at her last visit, but it was very expensive, costing half of the social security benefit that she lives on. She needs a less expensive alternative that will provide the same benefit.    Patient Active Problem List   Diagnosis    Tobacco dependence (50 years, 0.75ppd)    Elevated blood pressure reading    S/P total hysterectomy    Mixed hyperlipidemia    Pulmonary nodules    Centrilobular emphysema (H)    Osteopenia    Fracture Risk Assessment Score (FRAX) indicating greater than 3% risk for hip fracture    Prediabetes    History of colonic polyps    Female stress incontinence    Lichen sclerosus    Subareolar mass of left breast       Past Medical History:   Diagnosis Date    Abdominal cramping 05/15/2023    Centrilobular emphysema (H) 10/04/2023    Closed 3-part fracture of proximal end of left humerus with malunion 10/05/2018    Hypertension 5-    Mixed hyperlipidemia     Shoulder stiffness, left 2018       Past Surgical History:   Procedure Laterality Date    BREAST LUMPECTOMY, RT/LT Left 1999    Biopsy results Bartholin cyst.     COLONOSCOPY N/A 2023    rpt 2 years; Procedure: COLONOSCOPY with polypectomy;  Surgeon: Ramez Corona MD;  Location: [facility]    COLONOSCOPY N/A 10/22/2024    Procedure: COLONOSCOPY with polypectomy;  Surgeon: Jesús Owusu MD;  Location: [facility]    HYSTERECTOMY TOTAL ABDOMINAL, BILATERAL SALPINGO-OOPHORECTOMY, COMBINED Bilateral 1999    Performed at [facility] by Dr. LAURA Michael for fibroid uterus.     LAPAROSCOPIC CHOLECYSTECTOMY N/A 2023    Procedure: Laparoscopic Cholecystectomy;  Surgeon: Ramez Corona MD;  Location: [facility]    TOE SURGERY Bilateral     end of toes cut off from bone infection. had chronic ingrown nails.       Social History     Tobacco Use     "Smoking status: Every Day     Current packs/day: 0.75     Average packs/day: 0.8 packs/day for 51.9 years (38.9 ttl pk-yrs)     Types: Cigarettes     Start date: 1973    Smokeless tobacco: Never    Tobacco comments:     50 years   Substance Use Topics    Alcohol use: No       Family History   Problem Relation Age of Onset    Breast Cancer Mother         age unknown, 1 breast    Thyroid Disease Mother     Lung Cancer Father         mesothelioma    Hashimoto's thyroiditis Sister     Aneurysm Maternal Grandmother     Bone Cancer Maternal Grandfather     Heart Disease Brother 72          No Known Allergies    Current Outpatient Medications   Medication Sig Dispense Refill    Calcium Carbonate (CALCIUM 600 PO) Take 1 tablet by mouth daily      clobetasol (TEMOVATE) 0.05 % external ointment Apply 0.5FTU per taper to external vaginal area. Apply nightly for four weeks then Apply every other night for four weeks then Apply 2x/week for four weeks 30 g 0    Cyanocobalamin 5000 MCG SUBL Place 1 tablet under the tongue daily      diphenhydrAMINE-APAP, sleep, (TYLENOL PM EXTRA STRENGTH PO) Take 2 tablets by mouth daily      glucosamine-chondroitinoitin 750-600 MG TABS Take 1 tablet by mouth 2 times daily      mirabegron (MYRBETRIQ) 25 MG 24 hr tablet Take 1 tablet (25 mg) by mouth daily. 30 tablet 11    Multiple Vitamins-Minerals (PRESERVISION AREDS PO) Take 1 tablet by mouth 2 times daily.      oxyBUTYnin ER (DITROPAN XL) 5 MG 24 hr tablet Take 1 tablet (5 mg) by mouth daily. 30 tablet 11    rosuvastatin (CRESTOR) 10 MG tablet Take 1 tablet (10 mg) by mouth daily. 90 tablet 3    Vitamin D3 (CHOLECALCIFEROL) 125 MCG (5000 UT) tablet Take 1 tablet by mouth 2 times daily           Review Of Systems  See HPI      Objective:  /82   Pulse 108   Resp 16   Ht 1.753 m (5' 9\")   Wt 96.2 kg (212 lb)   SpO2 97%   BMI 31.31 kg/m      Exam:  Constitutional: NAD         Assessment/Plan:    1. Mixed incontinence (Primary)  We " looked at [employer], which doesn't offer much of a break. She will also look into the mirbetriq discount program online. In the meantime, she will try oxybutynin. Shared patient drug information and discussed risks of dry mouth and eyes, dementia, and other related concerns. She is interested in giving it a try and will have her loved ones keep an eye on her behavior to ensure that she is tolerating it well.  - oxyBUTYnin ER (DITROPAN XL) 5 MG 24 hr tablet; Take 1 tablet (5 mg) by mouth daily.  Dispense: 30 tablet; Refill: 11    Return as needed. She knows I'll be here in December.    10 minutes spent on the date of the encounter doing chart review, history and exam, documentation and further activities per the note.        Jessica Farmer MD  Obstetrics/Gynecology

## 2024-11-13 NOTE — PATIENT INSTRUCTIONS
This is a program that may be helpful:  [email or web address]/cost-savings-[email or web address]-support/    Good Rx can bring the cost down to $400/month    We can also try oxybutynin, which is significantly less expensive, but the side effect/risk profile is greater.

## 2024-12-16 ENCOUNTER — OFFICE VISIT (OUTPATIENT)
Dept: FAMILY MEDICINE | Facility: OTHER | Age: 69
End: 2024-12-16
Attending: STUDENT IN AN ORGANIZED HEALTH CARE EDUCATION/TRAINING PROGRAM
Payer: MEDICARE

## 2024-12-16 ENCOUNTER — LAB (OUTPATIENT)
Dept: LAB | Facility: OTHER | Age: 69
End: 2024-12-16
Attending: STUDENT IN AN ORGANIZED HEALTH CARE EDUCATION/TRAINING PROGRAM
Payer: MEDICARE

## 2024-12-16 VITALS
BODY MASS INDEX: 32.35 KG/M2 | SYSTOLIC BLOOD PRESSURE: 145 MMHG | DIASTOLIC BLOOD PRESSURE: 80 MMHG | RESPIRATION RATE: 16 BRPM | HEIGHT: 69 IN | OXYGEN SATURATION: 98 % | HEART RATE: 86 BPM | WEIGHT: 218.4 LBS | TEMPERATURE: 98.6 F

## 2024-12-16 DIAGNOSIS — E78.2 MIXED HYPERLIPIDEMIA: Chronic | ICD-10-CM

## 2024-12-16 DIAGNOSIS — E78.2 MIXED HYPERLIPIDEMIA: ICD-10-CM

## 2024-12-16 DIAGNOSIS — L90.0 LICHEN SCLEROSUS: Primary | ICD-10-CM

## 2024-12-16 DIAGNOSIS — Z23 HIGH PRIORITY FOR 2019-NCOV VACCINE: ICD-10-CM

## 2024-12-16 DIAGNOSIS — R03.0 ELEVATED BLOOD PRESSURE READING: ICD-10-CM

## 2024-12-16 LAB
ALT SERPL W P-5'-P-CCNC: 18 U/L (ref 0–50)
AST SERPL W P-5'-P-CCNC: 19 U/L (ref 0–45)
CHOLEST SERPL-MCNC: 185 MG/DL
FASTING STATUS PATIENT QL REPORTED: YES
HDLC SERPL-MCNC: 53 MG/DL
LDLC SERPL CALC-MCNC: 92 MG/DL
NONHDLC SERPL-MCNC: 132 MG/DL
TRIGL SERPL-MCNC: 202 MG/DL

## 2024-12-16 PROCEDURE — 36415 COLL VENOUS BLD VENIPUNCTURE: CPT | Mod: ZL

## 2024-12-16 PROCEDURE — 90480 ADMN SARSCOV2 VAC 1/ONLY CMP: CPT

## 2024-12-16 PROCEDURE — 84460 ALANINE AMINO (ALT) (SGPT): CPT | Mod: ZL

## 2024-12-16 PROCEDURE — 83718 ASSAY OF LIPOPROTEIN: CPT | Mod: ZL

## 2024-12-16 PROCEDURE — 84450 TRANSFERASE (AST) (SGOT): CPT | Mod: ZL

## 2024-12-16 PROCEDURE — G0463 HOSPITAL OUTPT CLINIC VISIT: HCPCS | Mod: 25 | Performed by: STUDENT IN AN ORGANIZED HEALTH CARE EDUCATION/TRAINING PROGRAM

## 2024-12-16 PROCEDURE — 82465 ASSAY BLD/SERUM CHOLESTEROL: CPT | Mod: ZL

## 2024-12-16 PROCEDURE — 80061 LIPID PANEL: CPT | Mod: ZL

## 2024-12-16 RX ORDER — CLOBETASOL PROPIONATE 0.5 MG/G
OINTMENT TOPICAL
Qty: 30 G | Refills: 1 | Status: SHIPPED | OUTPATIENT
Start: 2024-12-16

## 2024-12-16 ASSESSMENT — PAIN SCALES - GENERAL: PAINLEVEL_OUTOF10: NO PAIN (0)

## 2024-12-16 NOTE — PROGRESS NOTES
Assessment & Plan     Lichen sclerosus  Improved.  No active plaques or lesions.  Recommend to continue with clobetasol at least 2 times per week.  Will reassess again at physical next year.    Mixed hyperlipidemia  LDL now less than 100 with Crestor 10 mg.  AST and ALT stable.  Lipid profile reviewed.  Will continue current dose.  Reassess again next year with physical.  - AST; Future  - ALT; Future    High priority for 2019-nCoV vaccine  Given today    Elevated blood pressure reading  Blood pressure elevated but home readings are 1 20-1 30 systolic.  Recommend to continue home reading.  Consider bringing in home blood pressure cuff with next appointment.            Subjective   Delores is a 69 year old, presenting for the following health issues:  Lichen sclerosus, Lipids, and Imm/Inj (COVID-19 VACCINE)        12/16/2024     8:29 AM   Additional Questions   Roomed by Doris ley   Accompanied by self         12/16/2024     8:29 AM   Patient Reported Additional Medications   Patient reports taking the following new medications None     History of Present Illness       Reason for visit:  Scheduled appointment    She eats 0-1 servings of fruits and vegetables daily.She consumes 2 sweetened beverage(s) daily.She exercises with enough effort to increase her heart rate 10 to 19 minutes per day.  She exercises with enough effort to increase her heart rate 3 or less days per week.   She is taking medications regularly.       Hyperlipidemia Follow-Up    Are you regularly taking any medication or supplement to lower your cholesterol?   Yes- rosuvastatin  Are you having muscle aches or other side effects that you think could be caused by your cholesterol lowering medication?  No    Concern - follow up Lichen sclerosus  Onset: Noticed this spot in April 2024  Description: Spot on groin/near vaginal area  Intensity: mild  Progression of Symptoms:  improving  Accompanying Signs & Symptoms: None  Previous history of similar  "problem: No  Precipitating factors:        Worsened by: Unknown/None  Alleviating factors:        Improved by: prescribed ointment  Therapies tried and outcome: clobetasol 0.05% ointment helped    Clobetasol twice weekly now     Home readings are 128-132 for blood pressure.         Objective    BP (!) 145/80   Pulse 86   Temp 98.6  F (37  C) (Tympanic)   Resp 16   Ht 1.753 m (5' 9\")   Wt 99.1 kg (218 lb 6.4 oz)   SpO2 98%   BMI 32.25 kg/m    Body mass index is 32.25 kg/m .  Physical Exam  Constitutional:       General: She is not in acute distress.     Appearance: Normal appearance. She is not ill-appearing.   Genitourinary:     General: Normal vulva.      Comments: No plaques or lesions   Neurological:      Mental Status: She is alert.            Results for orders placed or performed in visit on 12/16/24   Lipid Profile (Chol, Trig, HDL, LDL calc)     Status: Abnormal   Result Value Ref Range    Cholesterol 185 <200 mg/dL    Triglycerides 202 (H) <150 mg/dL    Direct Measure HDL 53 >=50 mg/dL    LDL Cholesterol Calculated 92 <100 mg/dL    Non HDL Cholesterol 132 (H) <130 mg/dL    Patient Fasting > 8hrs? Yes     Narrative    Cholesterol  Desirable: < 200 mg/dL  Borderline High: 200 - 239 mg/dL  High: >= 240 mg/dL    Triglycerides  Normal: < 150 mg/dL  Borderline High: 150 - 199 mg/dL  High: 200-499 mg/dL  Very High: >= 500 mg/dL    Direct Measure HDL  Female: >= 50 mg/dL   Male: >= 40 mg/dL    LDL Cholesterol  Desirable: < 100 mg/dL  Above Desirable: 100 - 129 mg/dL   Borderline High: 130 - 159 mg/dL   High:  160 - 189 mg/dL   Very High: >= 190 mg/dL    Non HDL Cholesterol  Desirable: < 130 mg/dL  Above Desirable: 130 - 159 mg/dL  Borderline High: 160 - 189 mg/dL  High: 190 - 219 mg/dL  Very High: >= 220 mg/dL   AST     Status: Normal   Result Value Ref Range    AST 19 0 - 45 U/L   ALT     Status: Normal   Result Value Ref Range    ALT 18 0 - 50 U/L             Signed Electronically by: Melissa Merritt, " MD

## 2024-12-16 NOTE — LETTER
My COPD Action Plan     Name: Delorse Mac    YOB: 1955   Date: 12/16/2024    My doctor: Melissa Merritt MD   My clinic: [facility] [place]    [facility] [address] [facility]  [place] [address]  [phone]  My Controller Medicine:  none   Dose:      My Rescue Medicine: Albuterol (Proair/Ventolin/Proventil) inhaler        My Flare Up Medicine: Prednisone   Dose: call md     My COPD Severity: Moderate = FeV1 < 79% -50%      Use of Oxygen: Oxygen Not Prescribed      Make sure you've had your pneumonia   vaccines.          GREEN ZONE       Doing well today    Usual level of activity and exercise  Usual amount of cough and mucus  No shortness of breath  Usual level of health (thinking clearly, sleeping well, feel like eating) Actions:    Take daily medicines  Use oxygen as prescribed  Follow regular exercise and diet plan  Avoid cigarette smoke and other irritants that harm the lungs           YELLOW ZONE          Having a bad day or flare up    Short of breath more than usual  A lot more sputum (mucus) than usual  Sputum looks yellow, green, tan, brown or bloody  More coughing or wheezing  Fever or chills  Less energy; trouble completing activities  Trouble thinking or focusing  Using quick relief inhaler or nebulizer more often  Poor sleep; symptoms wake me up  Do not feel like eating Actions:    Get plenty of rest  Take daily medicines  Use quick relief inhaler every 4 hours  If you use oxygen, call you doctor to see if you should adjust your oxygen  Do breathing exercises or other things to help you relax  Let a loved one, friend or neighbor know you are feeling worse  Call your care team if you have 2 or more symptoms.  Start taking steroids or antibiotics if directed by your care team           RED ZONE       Need medical care now    Severe shortness of breath (feel you can't breathe)  Fever, chills  Not enough breath to do any activity  Trouble coughing up mucus, walking or talking  Blood in  mucus  Frequent coughing Rescue medicines are not working  Not able to sleep because of breathing  Feel confused or drowsy  Chest pain    Actions:    Call your health care team.  If you cannot reach your care team, call 911 or go to the emergency room.        Annual Reminders:  Meet with Care Team, Flu Shot every Fall  Pharmacy: [id number] - [place], [place] - [phone]

## 2025-01-11 NOTE — Clinical Note
Hi Dr Cj Estrella has a UTI (starting treatment today for five days, done around 6/21/23). Any concern with proceeding with colonoscopy on 6/22/23? Thanks!
She is having a colonoscopy 6/22/2023.  She was diagnosed with a urinary tract infection today, will be on 5 days of Macrobid, completing around 6/21/2023.  There are any concerns with proceeding with a colonoscopy on this date from your standpoint?
stated

## 2025-07-22 ENCOUNTER — PATIENT OUTREACH (OUTPATIENT)
Dept: GASTROENTEROLOGY | Facility: CLINIC | Age: 70
End: 2025-07-22

## 2025-07-22 PROBLEM — D12.6 TUBULAR ADENOMA OF COLON: Status: ACTIVE | Noted: 2025-07-22

## 2025-09-03 DIAGNOSIS — E78.2 MIXED HYPERLIPIDEMIA: Chronic | ICD-10-CM

## 2025-09-03 RX ORDER — ROSUVASTATIN CALCIUM 10 MG/1
10 TABLET, COATED ORAL DAILY
Qty: 90 TABLET | Refills: 0 | Status: SHIPPED | OUTPATIENT
Start: 2025-09-03

## (undated) DEVICE — ENDO SNARE EXACTO COLD 9MM LOOP 2.4MMX230CM 00711115

## (undated) DEVICE — ELECTRODE CAUTERY LAP L-HOOK W/SHAFT 33CM 0020S

## (undated) DEVICE — EVAC SYSTEM CLEAR FLOW SC082500

## (undated) DEVICE — ENDO TRAP POLYP E-TRAP 00711099

## (undated) DEVICE — ENDO TROCAR FIRST ENTRY KII FIOS ADV FIX 05X100MM CFF03

## (undated) DEVICE — SU MONOCRYL 4-0 PS-2 18" UND Y496G

## (undated) DEVICE — CLIP APPLIER ENDO 5MM M/L LIGAMAX EL5ML

## (undated) DEVICE — CANISTER SUCTION MEDI-VAC GUARDIAN 2000ML 90D 65651-220

## (undated) DEVICE — SOL NACL 0.9% IRRIG 1000ML BOTTLE 2F7124

## (undated) DEVICE — FORCEPS BIOPSY RADIAL JAW 4 LARGE W/NEEDLE 240CM M00513332

## (undated) DEVICE — ESU GROUND PAD ADULT W/CORD E7507

## (undated) DEVICE — TUBING SUCTION 20FT N620A

## (undated) DEVICE — GLOVE 8.5 PROTEXIS PI CLSC PF BD CUF STRL LF 12IN 2D72PL85X

## (undated) DEVICE — ENDO DISSECTOR BLUNT 05MM  BTD05

## (undated) DEVICE — ENDO POUCH UNIV RETRIEVAL SYSTEM INZII 10MM CD001

## (undated) DEVICE — SYSTEM CLEARIFY VISUALIZATION 21-345

## (undated) DEVICE — SOL WATER IRRIG 1000ML BOTTLE 2F7114

## (undated) DEVICE — GOWN SURG XL LVL 3 REINFORCED 9541

## (undated) DEVICE — GOWN-SURG XL LVL 3 REINFORCED

## (undated) DEVICE — CONNECTOR ERBEFLO 2 PORT 20325-215

## (undated) DEVICE — CORD-LAPAROSCOPIC MONOPOLAR-DISPOSABLE

## (undated) DEVICE — STPL SKIN 35W 6.9MM  PXW35

## (undated) DEVICE — LABEL STERILE PREPRINTED FOR OR FRRH01-2M

## (undated) DEVICE — SLEEVE SCD EXPRESS KNEE LENGTH MED 9529

## (undated) DEVICE — COVER LT HANDLE 2/PK 5160-2FG

## (undated) DEVICE — ENDO TROCAR BLUNT TIP KII BALLOON 12X100MM C0R47

## (undated) DEVICE — ENDO TROCAR SLEEVE KII ADV FIXATION 05X100MM CFS02

## (undated) DEVICE — PREP CHLORAPREP 26ML TINTED HI-LITE ORANGE 930815

## (undated) DEVICE — SOL NACL 0.9% INJ 1000ML BAG 2B1324X

## (undated) DEVICE — TUBING INSUFFLATION INSUFFLATOR FILTER HIGH FLOW 031322-01

## (undated) DEVICE — PACK LAPAROSCOPY CUSTOM SBACNLSMBF

## (undated) DEVICE — BLANKET BAIR HUGGER UPPER BODY 42268

## (undated) DEVICE — SUCTION STRYKERFLOW II 250-070-500

## (undated) DEVICE — ESU ENDO SCISSORS 5MM CVD 5DCS

## (undated) DEVICE — SU VICRYL 0 UR-6 27" J603H

## (undated) DEVICE — SUCTION MANIFOLD NEPTUNE 2 SYS 1 PORT 702-025-000

## (undated) DEVICE — FORCEP-COLON BIOPSY LARGE W/NEEDLE 240CM

## (undated) DEVICE — PACK BASIN SET UP SUTCNBSBBA

## (undated) DEVICE — DRESSING MEPILEX BORDER AG 3" X 3" (7.5CM X 7.5CM) 395290

## (undated) DEVICE — SNARE-ROTATABLE 20MM MINI OVAL

## (undated) DEVICE — NDL-SCLEROTHERAPY INTERJECT

## (undated) RX ORDER — PROPOFOL 10 MG/ML
INJECTION, EMULSION INTRAVENOUS
Status: DISPENSED
Start: 2023-06-22

## (undated) RX ORDER — FENTANYL CITRATE-0.9 % NACL/PF 10 MCG/ML
PLASTIC BAG, INJECTION (ML) INTRAVENOUS
Status: DISPENSED
Start: 2023-08-21

## (undated) RX ORDER — PROPOFOL 10 MG/ML
INJECTION, EMULSION INTRAVENOUS
Status: DISPENSED
Start: 2023-08-21

## (undated) RX ORDER — FENTANYL CITRATE 50 UG/ML
INJECTION, SOLUTION INTRAMUSCULAR; INTRAVENOUS
Status: DISPENSED
Start: 2023-08-21

## (undated) RX ORDER — PROPOFOL 10 MG/ML
INJECTION, EMULSION INTRAVENOUS
Status: DISPENSED
Start: 2024-10-22

## (undated) RX ORDER — DEXAMETHASONE SODIUM PHOSPHATE 10 MG/ML
INJECTION, SOLUTION INTRAMUSCULAR; INTRAVENOUS
Status: DISPENSED
Start: 2023-08-21

## (undated) RX ORDER — ONDANSETRON 2 MG/ML
INJECTION INTRAMUSCULAR; INTRAVENOUS
Status: DISPENSED
Start: 2023-08-21

## (undated) RX ORDER — DEXMEDETOMIDINE HYDROCHLORIDE 100 UG/ML
INJECTION, SOLUTION INTRAVENOUS
Status: DISPENSED
Start: 2023-08-21